# Patient Record
Sex: FEMALE | Race: WHITE | Employment: OTHER | URBAN - METROPOLITAN AREA
[De-identification: names, ages, dates, MRNs, and addresses within clinical notes are randomized per-mention and may not be internally consistent; named-entity substitution may affect disease eponyms.]

---

## 2020-01-01 ENCOUNTER — APPOINTMENT (OUTPATIENT)
Dept: SPEECH THERAPY | Facility: CLINIC | Age: 70
DRG: 682 | End: 2020-01-01
Attending: STUDENT IN AN ORGANIZED HEALTH CARE EDUCATION/TRAINING PROGRAM
Payer: COMMERCIAL

## 2020-01-01 ENCOUNTER — AMBULATORY - HEALTHEAST (OUTPATIENT)
Dept: OTHER | Facility: CLINIC | Age: 70
End: 2020-01-01

## 2020-01-01 ENCOUNTER — TRANSFERRED RECORDS (OUTPATIENT)
Dept: HEALTH INFORMATION MANAGEMENT | Facility: CLINIC | Age: 70
End: 2020-01-01

## 2020-01-01 ENCOUNTER — APPOINTMENT (OUTPATIENT)
Dept: OCCUPATIONAL THERAPY | Facility: CLINIC | Age: 70
DRG: 682 | End: 2020-01-01
Attending: INTERNAL MEDICINE
Payer: COMMERCIAL

## 2020-01-01 ENCOUNTER — HOSPITAL ENCOUNTER (INPATIENT)
Facility: CLINIC | Age: 70
LOS: 27 days | DRG: 682 | End: 2020-08-24
Attending: INTERNAL MEDICINE | Admitting: INTERNAL MEDICINE
Payer: COMMERCIAL

## 2020-01-01 ENCOUNTER — APPOINTMENT (OUTPATIENT)
Dept: PHYSICAL THERAPY | Facility: CLINIC | Age: 70
DRG: 682 | End: 2020-01-01
Attending: INTERNAL MEDICINE
Payer: COMMERCIAL

## 2020-01-01 ENCOUNTER — APPOINTMENT (OUTPATIENT)
Dept: INTERVENTIONAL RADIOLOGY/VASCULAR | Facility: CLINIC | Age: 70
DRG: 682 | End: 2020-01-01
Attending: NURSE PRACTITIONER
Payer: COMMERCIAL

## 2020-01-01 ENCOUNTER — APPOINTMENT (OUTPATIENT)
Dept: GENERAL RADIOLOGY | Facility: CLINIC | Age: 70
DRG: 682 | End: 2020-01-01
Attending: INTERNAL MEDICINE
Payer: COMMERCIAL

## 2020-01-01 ENCOUNTER — APPOINTMENT (OUTPATIENT)
Dept: INTERVENTIONAL RADIOLOGY/VASCULAR | Facility: CLINIC | Age: 70
DRG: 682 | End: 2020-01-01
Attending: PHYSICIAN ASSISTANT
Payer: COMMERCIAL

## 2020-01-01 ENCOUNTER — DOCUMENTATION ONLY (OUTPATIENT)
Dept: OTHER | Facility: CLINIC | Age: 70
End: 2020-01-01

## 2020-01-01 ENCOUNTER — APPOINTMENT (OUTPATIENT)
Dept: OCCUPATIONAL THERAPY | Facility: CLINIC | Age: 70
DRG: 682 | End: 2020-01-01
Attending: STUDENT IN AN ORGANIZED HEALTH CARE EDUCATION/TRAINING PROGRAM
Payer: COMMERCIAL

## 2020-01-01 ENCOUNTER — APPOINTMENT (OUTPATIENT)
Dept: ULTRASOUND IMAGING | Facility: CLINIC | Age: 70
DRG: 682 | End: 2020-01-01
Attending: HOSPITALIST
Payer: COMMERCIAL

## 2020-01-01 ENCOUNTER — APPOINTMENT (OUTPATIENT)
Dept: ULTRASOUND IMAGING | Facility: CLINIC | Age: 70
DRG: 682 | End: 2020-01-01
Attending: INTERNAL MEDICINE
Payer: COMMERCIAL

## 2020-01-01 ENCOUNTER — APPOINTMENT (OUTPATIENT)
Dept: GENERAL RADIOLOGY | Facility: CLINIC | Age: 70
DRG: 682 | End: 2020-01-01
Attending: STUDENT IN AN ORGANIZED HEALTH CARE EDUCATION/TRAINING PROGRAM
Payer: COMMERCIAL

## 2020-01-01 ENCOUNTER — APPOINTMENT (OUTPATIENT)
Dept: GENERAL RADIOLOGY | Facility: CLINIC | Age: 70
DRG: 682 | End: 2020-01-01
Attending: HOSPITALIST
Payer: COMMERCIAL

## 2020-01-01 VITALS
TEMPERATURE: 97.5 F | DIASTOLIC BLOOD PRESSURE: 55 MMHG | RESPIRATION RATE: 12 BRPM | SYSTOLIC BLOOD PRESSURE: 112 MMHG | HEART RATE: 75 BPM | WEIGHT: 197.09 LBS | OXYGEN SATURATION: 95 % | HEIGHT: 60 IN | BODY MASS INDEX: 38.69 KG/M2

## 2020-01-01 LAB
ABO + RH BLD: NORMAL
ALBUMIN FLD-MCNC: 0.6 G/DL
ALBUMIN FLD-MCNC: 0.6 G/DL
ALBUMIN FLD-MCNC: NORMAL G/DL
ALBUMIN SERPL-MCNC: 2.2 G/DL (ref 3.4–5)
ALBUMIN SERPL-MCNC: 2.4 G/DL (ref 3.4–5)
ALBUMIN SERPL-MCNC: 2.5 G/DL (ref 3.4–5)
ALBUMIN SERPL-MCNC: 2.6 G/DL (ref 3.4–5)
ALBUMIN SERPL-MCNC: 2.6 G/DL (ref 3.4–5)
ALBUMIN SERPL-MCNC: 2.7 G/DL (ref 3.4–5)
ALBUMIN SERPL-MCNC: 2.7 G/DL (ref 3.4–5)
ALBUMIN SERPL-MCNC: 2.8 G/DL (ref 3.4–5)
ALBUMIN SERPL-MCNC: 3.1 G/DL (ref 3.4–5)
ALBUMIN UR-MCNC: 10 MG/DL
ALBUMIN UR-MCNC: 30 MG/DL
ALP SERPL-CCNC: 100 U/L (ref 40–150)
ALP SERPL-CCNC: 100 U/L (ref 40–150)
ALP SERPL-CCNC: 102 U/L (ref 40–150)
ALP SERPL-CCNC: 111 U/L (ref 40–150)
ALP SERPL-CCNC: 120 U/L (ref 40–150)
ALP SERPL-CCNC: 78 U/L (ref 40–150)
ALP SERPL-CCNC: 80 U/L (ref 40–150)
ALP SERPL-CCNC: 80 U/L (ref 40–150)
ALP SERPL-CCNC: 86 U/L (ref 40–150)
ALP SERPL-CCNC: 87 U/L (ref 40–150)
ALP SERPL-CCNC: 88 U/L (ref 40–150)
ALT SERPL W P-5'-P-CCNC: 10 U/L (ref 0–50)
ALT SERPL W P-5'-P-CCNC: 11 U/L (ref 0–50)
ALT SERPL W P-5'-P-CCNC: 8 U/L (ref 0–50)
ALT SERPL W P-5'-P-CCNC: 9 U/L (ref 0–50)
ALT SERPL W P-5'-P-CCNC: 9 U/L (ref 0–50)
AMMONIA PLAS-SCNC: 194 UMOL/L (ref 10–50)
AMMONIA PLAS-SCNC: 35 UMOL/L (ref 10–50)
ANION GAP SERPL CALCULATED.3IONS-SCNC: 10 MMOL/L (ref 3–14)
ANION GAP SERPL CALCULATED.3IONS-SCNC: 12 MMOL/L (ref 3–14)
ANION GAP SERPL CALCULATED.3IONS-SCNC: 12 MMOL/L (ref 3–14)
ANION GAP SERPL CALCULATED.3IONS-SCNC: 6 MMOL/L (ref 3–14)
ANION GAP SERPL CALCULATED.3IONS-SCNC: 7 MMOL/L (ref 3–14)
ANION GAP SERPL CALCULATED.3IONS-SCNC: 8 MMOL/L (ref 3–14)
ANION GAP SERPL CALCULATED.3IONS-SCNC: 9 MMOL/L (ref 3–14)
APPEARANCE FLD: NORMAL
APPEARANCE UR: ABNORMAL
APPEARANCE UR: CLEAR
AST SERPL W P-5'-P-CCNC: 10 U/L (ref 0–45)
AST SERPL W P-5'-P-CCNC: 11 U/L (ref 0–45)
AST SERPL W P-5'-P-CCNC: 12 U/L (ref 0–45)
AST SERPL W P-5'-P-CCNC: 13 U/L (ref 0–45)
AST SERPL W P-5'-P-CCNC: 6 U/L (ref 0–45)
AST SERPL W P-5'-P-CCNC: 7 U/L (ref 0–45)
AST SERPL W P-5'-P-CCNC: 8 U/L (ref 0–45)
BACTERIA #/AREA URNS HPF: ABNORMAL /HPF
BACTERIA SPEC CULT: ABNORMAL
BACTERIA SPEC CULT: NO GROWTH
BACTERIA SPEC CULT: NORMAL
BACTERIA SPEC CULT: NORMAL
BASE DEFICIT BLDV-SCNC: 11.2 MMOL/L
BASE DEFICIT BLDV-SCNC: 11.6 MMOL/L
BILIRUB DIRECT SERPL-MCNC: 0.4 MG/DL (ref 0–0.2)
BILIRUB SERPL-MCNC: 1 MG/DL (ref 0.2–1.3)
BILIRUB SERPL-MCNC: 1 MG/DL (ref 0.2–1.3)
BILIRUB SERPL-MCNC: 1.1 MG/DL (ref 0.2–1.3)
BILIRUB SERPL-MCNC: 1.2 MG/DL (ref 0.2–1.3)
BILIRUB SERPL-MCNC: 1.3 MG/DL (ref 0.2–1.3)
BILIRUB SERPL-MCNC: 1.4 MG/DL (ref 0.2–1.3)
BILIRUB SERPL-MCNC: 1.5 MG/DL (ref 0.2–1.3)
BILIRUB UR QL STRIP: ABNORMAL
BILIRUB UR QL STRIP: NEGATIVE
BLD GP AB SCN SERPL QL: NORMAL
BLD GP AB SCN SERPL QL: NORMAL
BLD PROD TYP BPU: NORMAL
BLD UNIT ID BPU: 0
BLOOD BANK CMNT PATIENT-IMP: NORMAL
BLOOD BANK CMNT PATIENT-IMP: NORMAL
BLOOD PRODUCT CODE: NORMAL
BPU ID: NORMAL
BUN SERPL-MCNC: 42 MG/DL (ref 7–30)
BUN SERPL-MCNC: 46 MG/DL (ref 7–30)
BUN SERPL-MCNC: 46 MG/DL (ref 7–30)
BUN SERPL-MCNC: 52 MG/DL (ref 7–30)
BUN SERPL-MCNC: 52 MG/DL (ref 7–30)
BUN SERPL-MCNC: 53 MG/DL (ref 7–30)
BUN SERPL-MCNC: 55 MG/DL (ref 7–30)
BUN SERPL-MCNC: 56 MG/DL (ref 7–30)
BUN SERPL-MCNC: 62 MG/DL (ref 7–30)
BUN SERPL-MCNC: 63 MG/DL (ref 7–30)
BUN SERPL-MCNC: 64 MG/DL (ref 7–30)
BUN SERPL-MCNC: 65 MG/DL (ref 7–30)
BUN SERPL-MCNC: 67 MG/DL (ref 7–30)
BUN SERPL-MCNC: 68 MG/DL (ref 7–30)
BUN SERPL-MCNC: 69 MG/DL (ref 7–30)
BUN SERPL-MCNC: 70 MG/DL (ref 7–30)
BUN SERPL-MCNC: 71 MG/DL (ref 7–30)
BUN SERPL-MCNC: 73 MG/DL (ref 7–30)
BUN SERPL-MCNC: 73 MG/DL (ref 7–30)
BUN SERPL-MCNC: 75 MG/DL (ref 7–30)
BUN SERPL-MCNC: 76 MG/DL (ref 7–30)
BUN SERPL-MCNC: 80 MG/DL (ref 7–30)
BUN SERPL-MCNC: 84 MG/DL (ref 7–30)
C DIFF TOX B STL QL: POSITIVE
CA-I BLD-MCNC: 4.8 MG/DL (ref 4.4–5.2)
CA-I SERPL ISE-MCNC: 4.7 MG/DL (ref 4.4–5.2)
CALCIUM SERPL-MCNC: 7.5 MG/DL (ref 8.5–10.1)
CALCIUM SERPL-MCNC: 7.6 MG/DL (ref 8.5–10.1)
CALCIUM SERPL-MCNC: 7.6 MG/DL (ref 8.5–10.1)
CALCIUM SERPL-MCNC: 7.8 MG/DL (ref 8.5–10.1)
CALCIUM SERPL-MCNC: 7.8 MG/DL (ref 8.5–10.1)
CALCIUM SERPL-MCNC: 7.9 MG/DL (ref 8.5–10.1)
CALCIUM SERPL-MCNC: 8 MG/DL (ref 8.5–10.1)
CALCIUM SERPL-MCNC: 8.1 MG/DL (ref 8.5–10.1)
CALCIUM SERPL-MCNC: 8.2 MG/DL (ref 8.5–10.1)
CALCIUM SERPL-MCNC: 8.3 MG/DL (ref 8.5–10.1)
CALCIUM SERPL-MCNC: 8.4 MG/DL (ref 8.5–10.1)
CALCIUM SERPL-MCNC: 8.5 MG/DL (ref 8.5–10.1)
CALCIUM SERPL-MCNC: 8.6 MG/DL (ref 8.5–10.1)
CHLORIDE SERPL-SCNC: 107 MMOL/L (ref 94–109)
CHLORIDE SERPL-SCNC: 108 MMOL/L (ref 94–109)
CHLORIDE SERPL-SCNC: 108 MMOL/L (ref 94–109)
CHLORIDE SERPL-SCNC: 110 MMOL/L (ref 94–109)
CHLORIDE SERPL-SCNC: 111 MMOL/L (ref 94–109)
CHLORIDE SERPL-SCNC: 111 MMOL/L (ref 94–109)
CHLORIDE SERPL-SCNC: 112 MMOL/L (ref 94–109)
CHLORIDE SERPL-SCNC: 113 MMOL/L (ref 94–109)
CHLORIDE SERPL-SCNC: 114 MMOL/L (ref 94–109)
CHLORIDE SERPL-SCNC: 116 MMOL/L (ref 94–109)
CHLORIDE SERPL-SCNC: 118 MMOL/L (ref 94–109)
CHLORIDE SERPL-SCNC: 118 MMOL/L (ref 94–109)
CHLORIDE SERPL-SCNC: 120 MMOL/L (ref 94–109)
CHLORIDE SERPL-SCNC: 120 MMOL/L (ref 94–109)
CHLORIDE SERPL-SCNC: 122 MMOL/L (ref 94–109)
CO2 SERPL-SCNC: 15 MMOL/L (ref 20–32)
CO2 SERPL-SCNC: 16 MMOL/L (ref 20–32)
CO2 SERPL-SCNC: 16 MMOL/L (ref 20–32)
CO2 SERPL-SCNC: 17 MMOL/L (ref 20–32)
CO2 SERPL-SCNC: 18 MMOL/L (ref 20–32)
CO2 SERPL-SCNC: 19 MMOL/L (ref 20–32)
CO2 SERPL-SCNC: 20 MMOL/L (ref 20–32)
CO2 SERPL-SCNC: 21 MMOL/L (ref 20–32)
CO2 SERPL-SCNC: 21 MMOL/L (ref 20–32)
COLOR FLD: NORMAL
COLOR FLD: NORMAL
COLOR FLD: YELLOW
COLOR UR AUTO: YELLOW
COLOR UR AUTO: YELLOW
COPATH REPORT: NORMAL
CREAT SERPL-MCNC: 2.32 MG/DL (ref 0.52–1.04)
CREAT SERPL-MCNC: 2.36 MG/DL (ref 0.52–1.04)
CREAT SERPL-MCNC: 2.44 MG/DL (ref 0.52–1.04)
CREAT SERPL-MCNC: 2.5 MG/DL (ref 0.52–1.04)
CREAT SERPL-MCNC: 2.53 MG/DL (ref 0.52–1.04)
CREAT SERPL-MCNC: 2.54 MG/DL (ref 0.52–1.04)
CREAT SERPL-MCNC: 2.58 MG/DL (ref 0.52–1.04)
CREAT SERPL-MCNC: 2.65 MG/DL (ref 0.52–1.04)
CREAT SERPL-MCNC: 2.73 MG/DL (ref 0.52–1.04)
CREAT SERPL-MCNC: 2.79 MG/DL (ref 0.52–1.04)
CREAT SERPL-MCNC: 2.9 MG/DL (ref 0.52–1.04)
CREAT SERPL-MCNC: 3.06 MG/DL (ref 0.52–1.04)
CREAT SERPL-MCNC: 3.15 MG/DL (ref 0.52–1.04)
CREAT SERPL-MCNC: 3.31 MG/DL (ref 0.52–1.04)
CREAT SERPL-MCNC: 3.44 MG/DL (ref 0.52–1.04)
CREAT SERPL-MCNC: 3.46 MG/DL (ref 0.52–1.04)
CREAT SERPL-MCNC: 3.62 MG/DL (ref 0.52–1.04)
CREAT SERPL-MCNC: 3.62 MG/DL (ref 0.52–1.04)
CREAT SERPL-MCNC: 3.7 MG/DL (ref 0.52–1.04)
CREAT SERPL-MCNC: 3.95 MG/DL (ref 0.52–1.04)
CREAT SERPL-MCNC: 4.09 MG/DL (ref 0.52–1.04)
CREAT SERPL-MCNC: 4.49 MG/DL (ref 0.52–1.04)
CREAT SERPL-MCNC: 4.61 MG/DL (ref 0.52–1.04)
CREAT UR-MCNC: 136 MG/DL
CREAT UR-MCNC: 76 MG/DL
ERYTHROCYTE [DISTWIDTH] IN BLOOD BY AUTOMATED COUNT: 17.1 % (ref 10–15)
ERYTHROCYTE [DISTWIDTH] IN BLOOD BY AUTOMATED COUNT: 17.2 % (ref 10–15)
ERYTHROCYTE [DISTWIDTH] IN BLOOD BY AUTOMATED COUNT: 17.2 % (ref 10–15)
ERYTHROCYTE [DISTWIDTH] IN BLOOD BY AUTOMATED COUNT: 17.6 % (ref 10–15)
ERYTHROCYTE [DISTWIDTH] IN BLOOD BY AUTOMATED COUNT: 17.9 % (ref 10–15)
ERYTHROCYTE [DISTWIDTH] IN BLOOD BY AUTOMATED COUNT: 18.2 % (ref 10–15)
ERYTHROCYTE [DISTWIDTH] IN BLOOD BY AUTOMATED COUNT: 18.3 % (ref 10–15)
ERYTHROCYTE [DISTWIDTH] IN BLOOD BY AUTOMATED COUNT: 18.4 % (ref 10–15)
ERYTHROCYTE [DISTWIDTH] IN BLOOD BY AUTOMATED COUNT: 18.5 % (ref 10–15)
ERYTHROCYTE [DISTWIDTH] IN BLOOD BY AUTOMATED COUNT: 18.5 % (ref 10–15)
ERYTHROCYTE [DISTWIDTH] IN BLOOD BY AUTOMATED COUNT: 18.6 % (ref 10–15)
ERYTHROCYTE [DISTWIDTH] IN BLOOD BY AUTOMATED COUNT: 18.8 % (ref 10–15)
ERYTHROCYTE [DISTWIDTH] IN BLOOD BY AUTOMATED COUNT: 19.1 % (ref 10–15)
ERYTHROCYTE [DISTWIDTH] IN BLOOD BY AUTOMATED COUNT: 19.2 % (ref 10–15)
ERYTHROCYTE [DISTWIDTH] IN BLOOD BY AUTOMATED COUNT: 19.3 % (ref 10–15)
ERYTHROCYTE [DISTWIDTH] IN BLOOD BY AUTOMATED COUNT: NORMAL % (ref 10–15)
FERRITIN SERPL-MCNC: 352 NG/ML (ref 8–252)
FIBRINOGEN PPP-MCNC: 118 MG/DL (ref 200–420)
FIBRINOGEN PPP-MCNC: 139 MG/DL (ref 200–420)
GFR SERPL CREATININE-BSD FRML MDRD: 10 ML/MIN/{1.73_M2}
GFR SERPL CREATININE-BSD FRML MDRD: 11 ML/MIN/{1.73_M2}
GFR SERPL CREATININE-BSD FRML MDRD: 12 ML/MIN/{1.73_M2}
GFR SERPL CREATININE-BSD FRML MDRD: 13 ML/MIN/{1.73_M2}
GFR SERPL CREATININE-BSD FRML MDRD: 14 ML/MIN/{1.73_M2}
GFR SERPL CREATININE-BSD FRML MDRD: 15 ML/MIN/{1.73_M2}
GFR SERPL CREATININE-BSD FRML MDRD: 16 ML/MIN/{1.73_M2}
GFR SERPL CREATININE-BSD FRML MDRD: 16 ML/MIN/{1.73_M2}
GFR SERPL CREATININE-BSD FRML MDRD: 17 ML/MIN/{1.73_M2}
GFR SERPL CREATININE-BSD FRML MDRD: 17 ML/MIN/{1.73_M2}
GFR SERPL CREATININE-BSD FRML MDRD: 18 ML/MIN/{1.73_M2}
GFR SERPL CREATININE-BSD FRML MDRD: 19 ML/MIN/{1.73_M2}
GFR SERPL CREATININE-BSD FRML MDRD: 19 ML/MIN/{1.73_M2}
GFR SERPL CREATININE-BSD FRML MDRD: 20 ML/MIN/{1.73_M2}
GFR SERPL CREATININE-BSD FRML MDRD: 20 ML/MIN/{1.73_M2}
GFR SERPL CREATININE-BSD FRML MDRD: 9 ML/MIN/{1.73_M2}
GFR SERPL CREATININE-BSD FRML MDRD: 9 ML/MIN/{1.73_M2}
GLUCOSE BLDC GLUCOMTR-MCNC: 104 MG/DL (ref 70–99)
GLUCOSE BLDC GLUCOMTR-MCNC: 104 MG/DL (ref 70–99)
GLUCOSE BLDC GLUCOMTR-MCNC: 106 MG/DL (ref 70–99)
GLUCOSE BLDC GLUCOMTR-MCNC: 108 MG/DL (ref 70–99)
GLUCOSE BLDC GLUCOMTR-MCNC: 111 MG/DL (ref 70–99)
GLUCOSE BLDC GLUCOMTR-MCNC: 114 MG/DL (ref 70–99)
GLUCOSE BLDC GLUCOMTR-MCNC: 117 MG/DL (ref 70–99)
GLUCOSE BLDC GLUCOMTR-MCNC: 117 MG/DL (ref 70–99)
GLUCOSE BLDC GLUCOMTR-MCNC: 122 MG/DL (ref 70–99)
GLUCOSE BLDC GLUCOMTR-MCNC: 123 MG/DL (ref 70–99)
GLUCOSE BLDC GLUCOMTR-MCNC: 125 MG/DL (ref 70–99)
GLUCOSE BLDC GLUCOMTR-MCNC: 125 MG/DL (ref 70–99)
GLUCOSE BLDC GLUCOMTR-MCNC: 127 MG/DL (ref 70–99)
GLUCOSE BLDC GLUCOMTR-MCNC: 128 MG/DL (ref 70–99)
GLUCOSE BLDC GLUCOMTR-MCNC: 129 MG/DL (ref 70–99)
GLUCOSE BLDC GLUCOMTR-MCNC: 129 MG/DL (ref 70–99)
GLUCOSE BLDC GLUCOMTR-MCNC: 132 MG/DL (ref 70–99)
GLUCOSE BLDC GLUCOMTR-MCNC: 133 MG/DL (ref 70–99)
GLUCOSE BLDC GLUCOMTR-MCNC: 133 MG/DL (ref 70–99)
GLUCOSE BLDC GLUCOMTR-MCNC: 134 MG/DL (ref 70–99)
GLUCOSE BLDC GLUCOMTR-MCNC: 135 MG/DL (ref 70–99)
GLUCOSE BLDC GLUCOMTR-MCNC: 139 MG/DL (ref 70–99)
GLUCOSE BLDC GLUCOMTR-MCNC: 145 MG/DL (ref 70–99)
GLUCOSE BLDC GLUCOMTR-MCNC: 146 MG/DL (ref 70–99)
GLUCOSE BLDC GLUCOMTR-MCNC: 147 MG/DL (ref 70–99)
GLUCOSE BLDC GLUCOMTR-MCNC: 147 MG/DL (ref 70–99)
GLUCOSE BLDC GLUCOMTR-MCNC: 150 MG/DL (ref 70–99)
GLUCOSE BLDC GLUCOMTR-MCNC: 150 MG/DL (ref 70–99)
GLUCOSE BLDC GLUCOMTR-MCNC: 152 MG/DL (ref 70–99)
GLUCOSE BLDC GLUCOMTR-MCNC: 153 MG/DL (ref 70–99)
GLUCOSE BLDC GLUCOMTR-MCNC: 153 MG/DL (ref 70–99)
GLUCOSE BLDC GLUCOMTR-MCNC: 154 MG/DL (ref 70–99)
GLUCOSE BLDC GLUCOMTR-MCNC: 156 MG/DL (ref 70–99)
GLUCOSE BLDC GLUCOMTR-MCNC: 157 MG/DL (ref 70–99)
GLUCOSE BLDC GLUCOMTR-MCNC: 158 MG/DL (ref 70–99)
GLUCOSE BLDC GLUCOMTR-MCNC: 162 MG/DL (ref 70–99)
GLUCOSE BLDC GLUCOMTR-MCNC: 162 MG/DL (ref 70–99)
GLUCOSE BLDC GLUCOMTR-MCNC: 163 MG/DL (ref 70–99)
GLUCOSE BLDC GLUCOMTR-MCNC: 165 MG/DL (ref 70–99)
GLUCOSE BLDC GLUCOMTR-MCNC: 165 MG/DL (ref 70–99)
GLUCOSE BLDC GLUCOMTR-MCNC: 166 MG/DL (ref 70–99)
GLUCOSE BLDC GLUCOMTR-MCNC: 166 MG/DL (ref 70–99)
GLUCOSE BLDC GLUCOMTR-MCNC: 176 MG/DL (ref 70–99)
GLUCOSE BLDC GLUCOMTR-MCNC: 177 MG/DL (ref 70–99)
GLUCOSE BLDC GLUCOMTR-MCNC: 180 MG/DL (ref 70–99)
GLUCOSE BLDC GLUCOMTR-MCNC: 181 MG/DL (ref 70–99)
GLUCOSE BLDC GLUCOMTR-MCNC: 183 MG/DL (ref 70–99)
GLUCOSE BLDC GLUCOMTR-MCNC: 183 MG/DL (ref 70–99)
GLUCOSE BLDC GLUCOMTR-MCNC: 186 MG/DL (ref 70–99)
GLUCOSE BLDC GLUCOMTR-MCNC: 189 MG/DL (ref 70–99)
GLUCOSE BLDC GLUCOMTR-MCNC: 189 MG/DL (ref 70–99)
GLUCOSE BLDC GLUCOMTR-MCNC: 192 MG/DL (ref 70–99)
GLUCOSE BLDC GLUCOMTR-MCNC: 193 MG/DL (ref 70–99)
GLUCOSE BLDC GLUCOMTR-MCNC: 196 MG/DL (ref 70–99)
GLUCOSE BLDC GLUCOMTR-MCNC: 197 MG/DL (ref 70–99)
GLUCOSE BLDC GLUCOMTR-MCNC: 199 MG/DL (ref 70–99)
GLUCOSE BLDC GLUCOMTR-MCNC: 199 MG/DL (ref 70–99)
GLUCOSE BLDC GLUCOMTR-MCNC: 202 MG/DL (ref 70–99)
GLUCOSE BLDC GLUCOMTR-MCNC: 202 MG/DL (ref 70–99)
GLUCOSE BLDC GLUCOMTR-MCNC: 203 MG/DL (ref 70–99)
GLUCOSE BLDC GLUCOMTR-MCNC: 206 MG/DL (ref 70–99)
GLUCOSE BLDC GLUCOMTR-MCNC: 210 MG/DL (ref 70–99)
GLUCOSE BLDC GLUCOMTR-MCNC: 211 MG/DL (ref 70–99)
GLUCOSE BLDC GLUCOMTR-MCNC: 212 MG/DL (ref 70–99)
GLUCOSE BLDC GLUCOMTR-MCNC: 230 MG/DL (ref 70–99)
GLUCOSE BLDC GLUCOMTR-MCNC: 231 MG/DL (ref 70–99)
GLUCOSE BLDC GLUCOMTR-MCNC: 248 MG/DL (ref 70–99)
GLUCOSE BLDC GLUCOMTR-MCNC: 252 MG/DL (ref 70–99)
GLUCOSE BLDC GLUCOMTR-MCNC: 259 MG/DL (ref 70–99)
GLUCOSE BLDC GLUCOMTR-MCNC: 57 MG/DL (ref 70–99)
GLUCOSE BLDC GLUCOMTR-MCNC: 68 MG/DL (ref 70–99)
GLUCOSE BLDC GLUCOMTR-MCNC: 69 MG/DL (ref 70–99)
GLUCOSE BLDC GLUCOMTR-MCNC: 73 MG/DL (ref 70–99)
GLUCOSE BLDC GLUCOMTR-MCNC: 74 MG/DL (ref 70–99)
GLUCOSE BLDC GLUCOMTR-MCNC: 77 MG/DL (ref 70–99)
GLUCOSE BLDC GLUCOMTR-MCNC: 78 MG/DL (ref 70–99)
GLUCOSE BLDC GLUCOMTR-MCNC: 81 MG/DL (ref 70–99)
GLUCOSE BLDC GLUCOMTR-MCNC: 82 MG/DL (ref 70–99)
GLUCOSE BLDC GLUCOMTR-MCNC: 83 MG/DL (ref 70–99)
GLUCOSE BLDC GLUCOMTR-MCNC: 84 MG/DL (ref 70–99)
GLUCOSE BLDC GLUCOMTR-MCNC: 84 MG/DL (ref 70–99)
GLUCOSE BLDC GLUCOMTR-MCNC: 85 MG/DL (ref 70–99)
GLUCOSE BLDC GLUCOMTR-MCNC: 88 MG/DL (ref 70–99)
GLUCOSE BLDC GLUCOMTR-MCNC: 90 MG/DL (ref 70–99)
GLUCOSE BLDC GLUCOMTR-MCNC: 91 MG/DL (ref 70–99)
GLUCOSE BLDC GLUCOMTR-MCNC: 95 MG/DL (ref 70–99)
GLUCOSE SERPL-MCNC: 107 MG/DL (ref 70–99)
GLUCOSE SERPL-MCNC: 125 MG/DL (ref 70–99)
GLUCOSE SERPL-MCNC: 126 MG/DL (ref 70–99)
GLUCOSE SERPL-MCNC: 132 MG/DL (ref 70–99)
GLUCOSE SERPL-MCNC: 133 MG/DL (ref 70–99)
GLUCOSE SERPL-MCNC: 136 MG/DL (ref 70–99)
GLUCOSE SERPL-MCNC: 145 MG/DL (ref 70–99)
GLUCOSE SERPL-MCNC: 155 MG/DL (ref 70–99)
GLUCOSE SERPL-MCNC: 156 MG/DL (ref 70–99)
GLUCOSE SERPL-MCNC: 159 MG/DL (ref 70–99)
GLUCOSE SERPL-MCNC: 159 MG/DL (ref 70–99)
GLUCOSE SERPL-MCNC: 163 MG/DL (ref 70–99)
GLUCOSE SERPL-MCNC: 174 MG/DL (ref 70–99)
GLUCOSE SERPL-MCNC: 178 MG/DL (ref 70–99)
GLUCOSE SERPL-MCNC: 182 MG/DL (ref 70–99)
GLUCOSE SERPL-MCNC: 191 MG/DL (ref 70–99)
GLUCOSE SERPL-MCNC: 200 MG/DL (ref 70–99)
GLUCOSE SERPL-MCNC: 232 MG/DL (ref 70–99)
GLUCOSE SERPL-MCNC: 233 MG/DL (ref 70–99)
GLUCOSE SERPL-MCNC: 87 MG/DL (ref 70–99)
GLUCOSE SERPL-MCNC: 91 MG/DL (ref 70–99)
GLUCOSE SERPL-MCNC: 92 MG/DL (ref 70–99)
GLUCOSE SERPL-MCNC: 99 MG/DL (ref 70–99)
GLUCOSE UR STRIP-MCNC: NEGATIVE MG/DL
GLUCOSE UR STRIP-MCNC: NEGATIVE MG/DL
GRAM STN SPEC: NORMAL
GRAN CASTS #/AREA URNS LPF: 4 /LPF
HBA1C MFR BLD: 6.4 % (ref 0–5.6)
HCO3 BLDV-SCNC: 15 MMOL/L (ref 21–28)
HCO3 BLDV-SCNC: 15 MMOL/L (ref 21–28)
HCT VFR BLD AUTO: 23.4 % (ref 35–47)
HCT VFR BLD AUTO: 24.5 % (ref 35–47)
HCT VFR BLD AUTO: 24.6 % (ref 35–47)
HCT VFR BLD AUTO: 24.7 % (ref 35–47)
HCT VFR BLD AUTO: 24.9 % (ref 35–47)
HCT VFR BLD AUTO: 25 % (ref 35–47)
HCT VFR BLD AUTO: 25.2 % (ref 35–47)
HCT VFR BLD AUTO: 25.5 % (ref 35–47)
HCT VFR BLD AUTO: 25.8 % (ref 35–47)
HCT VFR BLD AUTO: 26.5 % (ref 35–47)
HCT VFR BLD AUTO: 26.6 % (ref 35–47)
HCT VFR BLD AUTO: 26.7 % (ref 35–47)
HCT VFR BLD AUTO: 26.7 % (ref 35–47)
HCT VFR BLD AUTO: 26.8 % (ref 35–47)
HCT VFR BLD AUTO: 27.9 % (ref 35–47)
HCT VFR BLD AUTO: 28.1 % (ref 35–47)
HCT VFR BLD AUTO: 28.5 % (ref 35–47)
HCT VFR BLD AUTO: 29.2 % (ref 35–47)
HCT VFR BLD AUTO: 29.8 % (ref 35–47)
HCT VFR BLD AUTO: 30.7 % (ref 35–47)
HCT VFR BLD AUTO: NORMAL % (ref 35–47)
HGB BLD-MCNC: 7.2 G/DL (ref 11.7–15.7)
HGB BLD-MCNC: 7.3 G/DL (ref 11.7–15.7)
HGB BLD-MCNC: 7.4 G/DL (ref 11.7–15.7)
HGB BLD-MCNC: 7.5 G/DL (ref 11.7–15.7)
HGB BLD-MCNC: 7.6 G/DL (ref 11.7–15.7)
HGB BLD-MCNC: 7.7 G/DL (ref 11.7–15.7)
HGB BLD-MCNC: 7.7 G/DL (ref 11.7–15.7)
HGB BLD-MCNC: 7.8 G/DL (ref 11.7–15.7)
HGB BLD-MCNC: 7.8 G/DL (ref 11.7–15.7)
HGB BLD-MCNC: 7.9 G/DL (ref 11.7–15.7)
HGB BLD-MCNC: 7.9 G/DL (ref 11.7–15.7)
HGB BLD-MCNC: 8.1 G/DL (ref 11.7–15.7)
HGB BLD-MCNC: 8.2 G/DL (ref 11.7–15.7)
HGB BLD-MCNC: 8.4 G/DL (ref 11.7–15.7)
HGB BLD-MCNC: 8.5 G/DL (ref 11.7–15.7)
HGB BLD-MCNC: 8.6 G/DL (ref 11.7–15.7)
HGB BLD-MCNC: 8.7 G/DL (ref 11.7–15.7)
HGB BLD-MCNC: 8.7 G/DL (ref 11.7–15.7)
HGB BLD-MCNC: 9 G/DL (ref 11.7–15.7)
HGB BLD-MCNC: 9.2 G/DL (ref 11.7–15.7)
HGB BLD-MCNC: NORMAL G/DL (ref 11.7–15.7)
HGB BLD-MCNC: NORMAL G/DL (ref 11.7–15.7)
HGB UR QL STRIP: ABNORMAL
HGB UR QL STRIP: ABNORMAL
HYALINE CASTS #/AREA URNS LPF: 5 /LPF (ref 0–2)
INR PPP: 1.37 (ref 0.86–1.14)
INR PPP: 1.48 (ref 0.86–1.14)
INR PPP: 1.54 (ref 0.86–1.14)
INR PPP: 1.55 (ref 0.86–1.14)
INR PPP: 1.57 (ref 0.86–1.14)
INR PPP: 1.57 (ref 0.86–1.14)
INR PPP: 1.58 (ref 0.86–1.14)
INR PPP: 1.64 (ref 0.86–1.14)
INR PPP: 1.66 (ref 0.86–1.14)
INR PPP: 1.67 (ref 0.86–1.14)
INR PPP: 1.71 (ref 0.86–1.14)
INR PPP: 1.81 (ref 0.86–1.14)
INR PPP: 1.82 (ref 0.86–1.14)
INTERPRETATION ECG - MUSE: NORMAL
INTERPRETATION ECG - MUSE: NORMAL
IRON SATN MFR SERPL: 47 % (ref 15–46)
IRON SERPL-MCNC: 51 UG/DL (ref 35–180)
KETONES UR STRIP-MCNC: NEGATIVE MG/DL
KETONES UR STRIP-MCNC: NEGATIVE MG/DL
LACTATE BLD-SCNC: 1.8 MMOL/L (ref 0.7–2)
LACTATE BLD-SCNC: 2.6 MMOL/L (ref 0.7–2)
LACTATE BLD-SCNC: NORMAL MMOL/L (ref 0.7–2)
LEUKOCYTE ESTERASE UR QL STRIP: ABNORMAL
LEUKOCYTE ESTERASE UR QL STRIP: ABNORMAL
LYMPHOCYTES NFR FLD MANUAL: 20 %
LYMPHOCYTES NFR FLD MANUAL: 23 %
LYMPHOCYTES NFR FLD MANUAL: 3 %
Lab: NORMAL
MAGNESIUM SERPL-MCNC: 1.4 MG/DL (ref 1.6–2.3)
MAGNESIUM SERPL-MCNC: 1.8 MG/DL (ref 1.6–2.3)
MAGNESIUM SERPL-MCNC: 1.9 MG/DL (ref 1.6–2.3)
MAGNESIUM SERPL-MCNC: 2.1 MG/DL (ref 1.6–2.3)
MCH RBC QN AUTO: 29 PG (ref 26.5–33)
MCH RBC QN AUTO: 29.2 PG (ref 26.5–33)
MCH RBC QN AUTO: 29.3 PG (ref 26.5–33)
MCH RBC QN AUTO: 29.7 PG (ref 26.5–33)
MCH RBC QN AUTO: 29.8 PG (ref 26.5–33)
MCH RBC QN AUTO: 29.9 PG (ref 26.5–33)
MCH RBC QN AUTO: 30 PG (ref 26.5–33)
MCH RBC QN AUTO: 30 PG (ref 26.5–33)
MCH RBC QN AUTO: 30.1 PG (ref 26.5–33)
MCH RBC QN AUTO: 30.2 PG (ref 26.5–33)
MCH RBC QN AUTO: 30.3 PG (ref 26.5–33)
MCH RBC QN AUTO: 30.5 PG (ref 26.5–33)
MCH RBC QN AUTO: NORMAL PG (ref 26.5–33)
MCHC RBC AUTO-ENTMCNC: 27.7 G/DL (ref 31.5–36.5)
MCHC RBC AUTO-ENTMCNC: 29.2 G/DL (ref 31.5–36.5)
MCHC RBC AUTO-ENTMCNC: 29.2 G/DL (ref 31.5–36.5)
MCHC RBC AUTO-ENTMCNC: 29.3 G/DL (ref 31.5–36.5)
MCHC RBC AUTO-ENTMCNC: 29.4 G/DL (ref 31.5–36.5)
MCHC RBC AUTO-ENTMCNC: 29.5 G/DL (ref 31.5–36.5)
MCHC RBC AUTO-ENTMCNC: 29.7 G/DL (ref 31.5–36.5)
MCHC RBC AUTO-ENTMCNC: 29.8 G/DL (ref 31.5–36.5)
MCHC RBC AUTO-ENTMCNC: 29.8 G/DL (ref 31.5–36.5)
MCHC RBC AUTO-ENTMCNC: 30 G/DL (ref 31.5–36.5)
MCHC RBC AUTO-ENTMCNC: 30.1 G/DL (ref 31.5–36.5)
MCHC RBC AUTO-ENTMCNC: 30.2 G/DL (ref 31.5–36.5)
MCHC RBC AUTO-ENTMCNC: 30.3 G/DL (ref 31.5–36.5)
MCHC RBC AUTO-ENTMCNC: 30.3 G/DL (ref 31.5–36.5)
MCHC RBC AUTO-ENTMCNC: 30.5 G/DL (ref 31.5–36.5)
MCHC RBC AUTO-ENTMCNC: 30.6 G/DL (ref 31.5–36.5)
MCHC RBC AUTO-ENTMCNC: 30.8 G/DL (ref 31.5–36.5)
MCHC RBC AUTO-ENTMCNC: 31.2 G/DL (ref 31.5–36.5)
MCHC RBC AUTO-ENTMCNC: NORMAL G/DL (ref 31.5–36.5)
MCV RBC AUTO: 100 FL (ref 78–100)
MCV RBC AUTO: 101 FL (ref 78–100)
MCV RBC AUTO: 101 FL (ref 78–100)
MCV RBC AUTO: 102 FL (ref 78–100)
MCV RBC AUTO: 102 FL (ref 78–100)
MCV RBC AUTO: 103 FL (ref 78–100)
MCV RBC AUTO: 109 FL (ref 78–100)
MCV RBC AUTO: 97 FL (ref 78–100)
MCV RBC AUTO: 97 FL (ref 78–100)
MCV RBC AUTO: 98 FL (ref 78–100)
MCV RBC AUTO: 99 FL (ref 78–100)
MCV RBC AUTO: NORMAL FL (ref 78–100)
MUCOUS THREADS #/AREA URNS LPF: PRESENT /LPF
MUCOUS THREADS #/AREA URNS LPF: PRESENT /LPF
NEUTS BAND NFR FLD MANUAL: 22 %
NEUTS BAND NFR FLD MANUAL: 31 %
NEUTS BAND NFR FLD MANUAL: 61 %
NITRATE UR QL: NEGATIVE
NITRATE UR QL: NEGATIVE
NUM BPU REQUESTED: 1
NUM BPU REQUESTED: 2
O2/TOTAL GAS SETTING VFR VENT: 21 %
O2/TOTAL GAS SETTING VFR VENT: 21 %
OTHER CELLS FLD MANUAL: 36 %
OTHER CELLS FLD MANUAL: 46 %
OTHER CELLS FLD MANUAL: 58 %
PCO2 BLDV: 33 MM HG (ref 40–50)
PCO2 BLDV: 34 MM HG (ref 40–50)
PH BLDV: 7.25 PH (ref 7.32–7.43)
PH BLDV: 7.26 PH (ref 7.32–7.43)
PH UR STRIP: 5 PH (ref 5–7)
PH UR STRIP: 5.5 PH (ref 5–7)
PHOSPHATE SERPL-MCNC: 1.8 MG/DL (ref 2.5–4.5)
PHOSPHATE SERPL-MCNC: 2.6 MG/DL (ref 2.5–4.5)
PHOSPHATE SERPL-MCNC: 2.6 MG/DL (ref 2.5–4.5)
PHOSPHATE SERPL-MCNC: 3.1 MG/DL (ref 2.5–4.5)
PHOSPHATE SERPL-MCNC: 3.1 MG/DL (ref 2.5–4.5)
PHOSPHATE SERPL-MCNC: 3.2 MG/DL (ref 2.5–4.5)
PHOSPHATE SERPL-MCNC: 3.4 MG/DL (ref 2.5–4.5)
PLATELET # BLD AUTO: 22 10E9/L (ref 150–450)
PLATELET # BLD AUTO: 30 10E9/L (ref 150–450)
PLATELET # BLD AUTO: 38 10E9/L (ref 150–450)
PLATELET # BLD AUTO: 41 10E9/L (ref 150–450)
PLATELET # BLD AUTO: 49 10E9/L (ref 150–450)
PLATELET # BLD AUTO: 53 10E9/L (ref 150–450)
PLATELET # BLD AUTO: 54 10E9/L (ref 150–450)
PLATELET # BLD AUTO: 54 10E9/L (ref 150–450)
PLATELET # BLD AUTO: 57 10E9/L (ref 150–450)
PLATELET # BLD AUTO: 57 10E9/L (ref 150–450)
PLATELET # BLD AUTO: 58 10E9/L (ref 150–450)
PLATELET # BLD AUTO: 58 10E9/L (ref 150–450)
PLATELET # BLD AUTO: 59 10E9/L (ref 150–450)
PLATELET # BLD AUTO: 59 10E9/L (ref 150–450)
PLATELET # BLD AUTO: 60 10E9/L (ref 150–450)
PLATELET # BLD AUTO: 62 10E9/L (ref 150–450)
PLATELET # BLD AUTO: 63 10E9/L (ref 150–450)
PLATELET # BLD AUTO: 66 10E9/L (ref 150–450)
PLATELET # BLD AUTO: 66 10E9/L (ref 150–450)
PLATELET # BLD AUTO: 72 10E9/L (ref 150–450)
PLATELET # BLD AUTO: 75 10E9/L (ref 150–450)
PLATELET # BLD AUTO: 76 10E9/L (ref 150–450)
PLATELET # BLD AUTO: NORMAL 10E9/L (ref 150–450)
PO2 BLDV: 43 MM HG (ref 25–47)
PO2 BLDV: 72 MM HG (ref 25–47)
POTASSIUM SERPL-SCNC: 4 MMOL/L (ref 3.4–5.3)
POTASSIUM SERPL-SCNC: 4.1 MMOL/L (ref 3.4–5.3)
POTASSIUM SERPL-SCNC: 4.2 MMOL/L (ref 3.4–5.3)
POTASSIUM SERPL-SCNC: 4.3 MMOL/L (ref 3.4–5.3)
POTASSIUM SERPL-SCNC: 4.4 MMOL/L (ref 3.4–5.3)
POTASSIUM SERPL-SCNC: 4.5 MMOL/L (ref 3.4–5.3)
POTASSIUM SERPL-SCNC: 4.6 MMOL/L (ref 3.4–5.3)
POTASSIUM SERPL-SCNC: 4.7 MMOL/L (ref 3.4–5.3)
POTASSIUM SERPL-SCNC: 4.9 MMOL/L (ref 3.4–5.3)
POTASSIUM SERPL-SCNC: 5.5 MMOL/L (ref 3.4–5.3)
PROT FLD-MCNC: 1.2 G/DL
PROT FLD-MCNC: 1.3 G/DL
PROT FLD-MCNC: NORMAL G/DL
PROT SERPL-MCNC: 5 G/DL (ref 6.8–8.8)
PROT SERPL-MCNC: 5.1 G/DL (ref 6.8–8.8)
PROT SERPL-MCNC: 5.4 G/DL (ref 6.8–8.8)
PROT SERPL-MCNC: 5.4 G/DL (ref 6.8–8.8)
PROT SERPL-MCNC: 5.5 G/DL (ref 6.8–8.8)
PROT SERPL-MCNC: 5.5 G/DL (ref 6.8–8.8)
PROT SERPL-MCNC: 5.6 G/DL (ref 6.8–8.8)
PROT SERPL-MCNC: 5.7 G/DL (ref 6.8–8.8)
PROT SERPL-MCNC: 5.7 G/DL (ref 6.8–8.8)
PROT SERPL-MCNC: 5.8 G/DL (ref 6.8–8.8)
PROT SERPL-MCNC: 6.3 G/DL (ref 6.8–8.8)
PROT UR-MCNC: 0.87 G/L
PROT/CREAT 24H UR: 0.64 G/G CR (ref 0–0.2)
RBC # BLD AUTO: 2.39 10E12/L (ref 3.8–5.2)
RBC # BLD AUTO: 2.4 10E12/L (ref 3.8–5.2)
RBC # BLD AUTO: 2.48 10E12/L (ref 3.8–5.2)
RBC # BLD AUTO: 2.51 10E12/L (ref 3.8–5.2)
RBC # BLD AUTO: 2.52 10E12/L (ref 3.8–5.2)
RBC # BLD AUTO: 2.53 10E12/L (ref 3.8–5.2)
RBC # BLD AUTO: 2.56 10E12/L (ref 3.8–5.2)
RBC # BLD AUTO: 2.58 10E12/L (ref 3.8–5.2)
RBC # BLD AUTO: 2.62 10E12/L (ref 3.8–5.2)
RBC # BLD AUTO: 2.66 10E12/L (ref 3.8–5.2)
RBC # BLD AUTO: 2.69 10E12/L (ref 3.8–5.2)
RBC # BLD AUTO: 2.71 10E12/L (ref 3.8–5.2)
RBC # BLD AUTO: 2.72 10E12/L (ref 3.8–5.2)
RBC # BLD AUTO: 2.73 10E12/L (ref 3.8–5.2)
RBC # BLD AUTO: 2.75 10E12/L (ref 3.8–5.2)
RBC # BLD AUTO: 2.82 10E12/L (ref 3.8–5.2)
RBC # BLD AUTO: 2.86 10E12/L (ref 3.8–5.2)
RBC # BLD AUTO: 2.87 10E12/L (ref 3.8–5.2)
RBC # BLD AUTO: 2.9 10E12/L (ref 3.8–5.2)
RBC # BLD AUTO: 2.93 10E12/L (ref 3.8–5.2)
RBC # BLD AUTO: NORMAL 10E12/L (ref 3.8–5.2)
RBC #/AREA URNS AUTO: 13 /HPF (ref 0–2)
RBC #/AREA URNS AUTO: 8 /HPF (ref 0–2)
SARS-COV-2 RNA SPEC QL NAA+PROBE: NOT DETECTED
SODIUM SERPL-SCNC: 133 MMOL/L (ref 133–144)
SODIUM SERPL-SCNC: 133 MMOL/L (ref 133–144)
SODIUM SERPL-SCNC: 134 MMOL/L (ref 133–144)
SODIUM SERPL-SCNC: 134 MMOL/L (ref 133–144)
SODIUM SERPL-SCNC: 137 MMOL/L (ref 133–144)
SODIUM SERPL-SCNC: 138 MMOL/L (ref 133–144)
SODIUM SERPL-SCNC: 138 MMOL/L (ref 133–144)
SODIUM SERPL-SCNC: 139 MMOL/L (ref 133–144)
SODIUM SERPL-SCNC: 139 MMOL/L (ref 133–144)
SODIUM SERPL-SCNC: 140 MMOL/L (ref 133–144)
SODIUM SERPL-SCNC: 140 MMOL/L (ref 133–144)
SODIUM SERPL-SCNC: 141 MMOL/L (ref 133–144)
SODIUM SERPL-SCNC: 143 MMOL/L (ref 133–144)
SODIUM SERPL-SCNC: 144 MMOL/L (ref 133–144)
SODIUM SERPL-SCNC: 145 MMOL/L (ref 133–144)
SODIUM SERPL-SCNC: 146 MMOL/L (ref 133–144)
SODIUM SERPL-SCNC: 149 MMOL/L (ref 133–144)
SODIUM UR-SCNC: 12 MMOL/L
SODIUM UR-SCNC: 9 MMOL/L
SOURCE: ABNORMAL
SOURCE: ABNORMAL
SP GR UR STRIP: 1.01 (ref 1–1.03)
SP GR UR STRIP: 1.02 (ref 1–1.03)
SPECIMEN EXP DATE BLD: NORMAL
SPECIMEN EXP DATE BLD: NORMAL
SPECIMEN SOURCE FLD: NORMAL
SPECIMEN SOURCE: ABNORMAL
SPECIMEN SOURCE: ABNORMAL
SPECIMEN SOURCE: NORMAL
SQUAMOUS #/AREA URNS AUTO: 1 /HPF (ref 0–1)
TIBC SERPL-MCNC: 108 UG/DL (ref 240–430)
TRANS CELLS #/AREA URNS HPF: 1 /HPF (ref 0–1)
TRANSFERRIN SERPL-MCNC: 87 MG/DL (ref 210–360)
TRANSFUSION STATUS PATIENT QL: NORMAL
TSH SERPL DL<=0.005 MIU/L-ACNC: 3.85 MU/L (ref 0.4–4)
UPPER GI ENDOSCOPY: NORMAL
UROBILINOGEN UR STRIP-MCNC: NORMAL MG/DL (ref 0–2)
UROBILINOGEN UR STRIP-MCNC: NORMAL MG/DL (ref 0–2)
WBC # BLD AUTO: 10.4 10E9/L (ref 4–11)
WBC # BLD AUTO: 10.9 10E9/L (ref 4–11)
WBC # BLD AUTO: 13 10E9/L (ref 4–11)
WBC # BLD AUTO: 4.4 10E9/L (ref 4–11)
WBC # BLD AUTO: 5.3 10E9/L (ref 4–11)
WBC # BLD AUTO: 5.3 10E9/L (ref 4–11)
WBC # BLD AUTO: 5.4 10E9/L (ref 4–11)
WBC # BLD AUTO: 5.4 10E9/L (ref 4–11)
WBC # BLD AUTO: 5.6 10E9/L (ref 4–11)
WBC # BLD AUTO: 5.7 10E9/L (ref 4–11)
WBC # BLD AUTO: 5.7 10E9/L (ref 4–11)
WBC # BLD AUTO: 5.8 10E9/L (ref 4–11)
WBC # BLD AUTO: 6.1 10E9/L (ref 4–11)
WBC # BLD AUTO: 6.4 10E9/L (ref 4–11)
WBC # BLD AUTO: 6.5 10E9/L (ref 4–11)
WBC # BLD AUTO: 6.5 10E9/L (ref 4–11)
WBC # BLD AUTO: 6.7 10E9/L (ref 4–11)
WBC # BLD AUTO: 7.5 10E9/L (ref 4–11)
WBC # BLD AUTO: 8.5 10E9/L (ref 4–11)
WBC # BLD AUTO: 8.5 10E9/L (ref 4–11)
WBC # BLD AUTO: NORMAL 10E9/L (ref 4–11)
WBC # FLD AUTO: 104 /UL
WBC # FLD AUTO: 161 /UL
WBC # FLD AUTO: 186 /UL
WBC #/AREA URNS AUTO: 17 /HPF (ref 0–5)
WBC #/AREA URNS AUTO: 74 /HPF (ref 0–5)
WBC CLUMPS #/AREA URNS HPF: PRESENT /HPF
YEAST #/AREA URNS HPF: ABNORMAL /HPF

## 2020-01-01 PROCEDURE — 00000146 ZZHCL STATISTIC GLUCOSE BY METER IP

## 2020-01-01 PROCEDURE — 36415 COLL VENOUS BLD VENIPUNCTURE: CPT | Performed by: STUDENT IN AN ORGANIZED HEALTH CARE EDUCATION/TRAINING PROGRAM

## 2020-01-01 PROCEDURE — 80053 COMPREHEN METABOLIC PANEL: CPT | Performed by: STUDENT IN AN ORGANIZED HEALTH CARE EDUCATION/TRAINING PROGRAM

## 2020-01-01 PROCEDURE — 25000132 ZZH RX MED GY IP 250 OP 250 PS 637: Performed by: STUDENT IN AN ORGANIZED HEALTH CARE EDUCATION/TRAINING PROGRAM

## 2020-01-01 PROCEDURE — 85027 COMPLETE CBC AUTOMATED: CPT | Performed by: STUDENT IN AN ORGANIZED HEALTH CARE EDUCATION/TRAINING PROGRAM

## 2020-01-01 PROCEDURE — 25000128 H RX IP 250 OP 636: Performed by: STUDENT IN AN ORGANIZED HEALTH CARE EDUCATION/TRAINING PROGRAM

## 2020-01-01 PROCEDURE — 25000132 ZZH RX MED GY IP 250 OP 250 PS 637: Performed by: PEDIATRICS

## 2020-01-01 PROCEDURE — P9047 ALBUMIN (HUMAN), 25%, 50ML: HCPCS | Performed by: PEDIATRICS

## 2020-01-01 PROCEDURE — 99233 SBSQ HOSP IP/OBS HIGH 50: CPT | Performed by: INTERNAL MEDICINE

## 2020-01-01 PROCEDURE — 12000001 ZZH R&B MED SURG/OB UMMC

## 2020-01-01 PROCEDURE — 97110 THERAPEUTIC EXERCISES: CPT | Mod: GP | Performed by: STUDENT IN AN ORGANIZED HEALTH CARE EDUCATION/TRAINING PROGRAM

## 2020-01-01 PROCEDURE — 85018 HEMOGLOBIN: CPT | Performed by: STUDENT IN AN ORGANIZED HEALTH CARE EDUCATION/TRAINING PROGRAM

## 2020-01-01 PROCEDURE — 99207 ZZC CDG-CHARGE REQUIRED MANUAL ENTRY: CPT | Performed by: INTERNAL MEDICINE

## 2020-01-01 PROCEDURE — 40001072 ZZH STATISTICAL VASC ACCESS NURSE TIME, 16-31 MINUTES

## 2020-01-01 PROCEDURE — 82330 ASSAY OF CALCIUM: CPT | Performed by: STUDENT IN AN ORGANIZED HEALTH CARE EDUCATION/TRAINING PROGRAM

## 2020-01-01 PROCEDURE — 25000131 ZZH RX MED GY IP 250 OP 636 PS 637: Performed by: STUDENT IN AN ORGANIZED HEALTH CARE EDUCATION/TRAINING PROGRAM

## 2020-01-01 PROCEDURE — 99233 SBSQ HOSP IP/OBS HIGH 50: CPT | Mod: GC | Performed by: INTERNAL MEDICINE

## 2020-01-01 PROCEDURE — 25800030 ZZH RX IP 258 OP 636: Performed by: STUDENT IN AN ORGANIZED HEALTH CARE EDUCATION/TRAINING PROGRAM

## 2020-01-01 PROCEDURE — 84300 ASSAY OF URINE SODIUM: CPT | Performed by: STUDENT IN AN ORGANIZED HEALTH CARE EDUCATION/TRAINING PROGRAM

## 2020-01-01 PROCEDURE — 82140 ASSAY OF AMMONIA: CPT | Performed by: STUDENT IN AN ORGANIZED HEALTH CARE EDUCATION/TRAINING PROGRAM

## 2020-01-01 PROCEDURE — 93005 ELECTROCARDIOGRAM TRACING: CPT

## 2020-01-01 PROCEDURE — 99233 SBSQ HOSP IP/OBS HIGH 50: CPT | Mod: GC | Performed by: HOSPITALIST

## 2020-01-01 PROCEDURE — 99207 ZZC CDG-MDM COMPONENT: MEETS MODERATE - UP CODED: CPT | Performed by: HOSPITALIST

## 2020-01-01 PROCEDURE — 85384 FIBRINOGEN ACTIVITY: CPT | Performed by: STUDENT IN AN ORGANIZED HEALTH CARE EDUCATION/TRAINING PROGRAM

## 2020-01-01 PROCEDURE — 97535 SELF CARE MNGMENT TRAINING: CPT | Mod: GO

## 2020-01-01 PROCEDURE — 25000132 ZZH RX MED GY IP 250 OP 250 PS 637: Performed by: INTERNAL MEDICINE

## 2020-01-01 PROCEDURE — C9113 INJ PANTOPRAZOLE SODIUM, VIA: HCPCS | Performed by: STUDENT IN AN ORGANIZED HEALTH CARE EDUCATION/TRAINING PROGRAM

## 2020-01-01 PROCEDURE — 99231 SBSQ HOSP IP/OBS SF/LOW 25: CPT | Mod: GC | Performed by: INTERNAL MEDICINE

## 2020-01-01 PROCEDURE — 80048 BASIC METABOLIC PNL TOTAL CA: CPT | Performed by: HOSPITALIST

## 2020-01-01 PROCEDURE — 97535 SELF CARE MNGMENT TRAINING: CPT | Mod: GO | Performed by: OCCUPATIONAL THERAPIST

## 2020-01-01 PROCEDURE — 36415 COLL VENOUS BLD VENIPUNCTURE: CPT

## 2020-01-01 PROCEDURE — 25000132 ZZH RX MED GY IP 250 OP 250 PS 637

## 2020-01-01 PROCEDURE — 12000004 ZZH R&B IMCU UMMC

## 2020-01-01 PROCEDURE — 25000125 ZZHC RX 250: Performed by: STUDENT IN AN ORGANIZED HEALTH CARE EDUCATION/TRAINING PROGRAM

## 2020-01-01 PROCEDURE — 25000128 H RX IP 250 OP 636: Performed by: INTERNAL MEDICINE

## 2020-01-01 PROCEDURE — 85049 AUTOMATED PLATELET COUNT: CPT | Performed by: STUDENT IN AN ORGANIZED HEALTH CARE EDUCATION/TRAINING PROGRAM

## 2020-01-01 PROCEDURE — 25800025 ZZH RX 258: Performed by: STUDENT IN AN ORGANIZED HEALTH CARE EDUCATION/TRAINING PROGRAM

## 2020-01-01 PROCEDURE — 25000125 ZZHC RX 250: Performed by: PHYSICIAN ASSISTANT

## 2020-01-01 PROCEDURE — 25000128 H RX IP 250 OP 636: Performed by: HOSPITALIST

## 2020-01-01 PROCEDURE — 82570 ASSAY OF URINE CREATININE: CPT | Performed by: STUDENT IN AN ORGANIZED HEALTH CARE EDUCATION/TRAINING PROGRAM

## 2020-01-01 PROCEDURE — P9047 ALBUMIN (HUMAN), 25%, 50ML: HCPCS | Performed by: STUDENT IN AN ORGANIZED HEALTH CARE EDUCATION/TRAINING PROGRAM

## 2020-01-01 PROCEDURE — 85610 PROTHROMBIN TIME: CPT | Performed by: STUDENT IN AN ORGANIZED HEALTH CARE EDUCATION/TRAINING PROGRAM

## 2020-01-01 PROCEDURE — 97110 THERAPEUTIC EXERCISES: CPT | Mod: GP

## 2020-01-01 PROCEDURE — 74018 RADEX ABDOMEN 1 VIEW: CPT

## 2020-01-01 PROCEDURE — 76700 US EXAM ABDOM COMPLETE: CPT

## 2020-01-01 PROCEDURE — 82248 BILIRUBIN DIRECT: CPT | Performed by: STUDENT IN AN ORGANIZED HEALTH CARE EDUCATION/TRAINING PROGRAM

## 2020-01-01 PROCEDURE — 40000556 ZZH STATISTIC PERIPHERAL IV START W US GUIDANCE

## 2020-01-01 PROCEDURE — 49083 ABD PARACENTESIS W/IMAGING: CPT

## 2020-01-01 PROCEDURE — 36415 COLL VENOUS BLD VENIPUNCTURE: CPT | Performed by: HOSPITALIST

## 2020-01-01 PROCEDURE — 83036 HEMOGLOBIN GLYCOSYLATED A1C: CPT | Performed by: STUDENT IN AN ORGANIZED HEALTH CARE EDUCATION/TRAINING PROGRAM

## 2020-01-01 PROCEDURE — 25000128 H RX IP 250 OP 636

## 2020-01-01 PROCEDURE — 25800030 ZZH RX IP 258 OP 636: Performed by: PEDIATRICS

## 2020-01-01 PROCEDURE — 80053 COMPREHEN METABOLIC PANEL: CPT | Performed by: INTERNAL MEDICINE

## 2020-01-01 PROCEDURE — 80048 BASIC METABOLIC PNL TOTAL CA: CPT | Performed by: STUDENT IN AN ORGANIZED HEALTH CARE EDUCATION/TRAINING PROGRAM

## 2020-01-01 PROCEDURE — 97530 THERAPEUTIC ACTIVITIES: CPT | Mod: GP

## 2020-01-01 PROCEDURE — 99232 SBSQ HOSP IP/OBS MODERATE 35: CPT | Performed by: INTERNAL MEDICINE

## 2020-01-01 PROCEDURE — P9047 ALBUMIN (HUMAN), 25%, 50ML: HCPCS | Performed by: PHYSICIAN ASSISTANT

## 2020-01-01 PROCEDURE — 25000128 H RX IP 250 OP 636: Performed by: NURSE PRACTITIONER

## 2020-01-01 PROCEDURE — 97110 THERAPEUTIC EXERCISES: CPT | Mod: GO

## 2020-01-01 PROCEDURE — 0DJ08ZZ INSPECTION OF UPPER INTESTINAL TRACT, VIA NATURAL OR ARTIFICIAL OPENING ENDOSCOPIC: ICD-10-PCS | Performed by: HOSPITALIST

## 2020-01-01 PROCEDURE — 84100 ASSAY OF PHOSPHORUS: CPT | Performed by: INTERNAL MEDICINE

## 2020-01-01 PROCEDURE — 00000155 ZZHCL STATISTIC H-CELL BLOCK W/STAIN: Performed by: STUDENT IN AN ORGANIZED HEALTH CARE EDUCATION/TRAINING PROGRAM

## 2020-01-01 PROCEDURE — 97110 THERAPEUTIC EXERCISES: CPT | Mod: GP | Performed by: PHYSICAL THERAPIST

## 2020-01-01 PROCEDURE — U0003 INFECTIOUS AGENT DETECTION BY NUCLEIC ACID (DNA OR RNA); SEVERE ACUTE RESPIRATORY SYNDROME CORONAVIRUS 2 (SARS-COV-2) (CORONAVIRUS DISEASE [COVID-19]), AMPLIFIED PROBE TECHNIQUE, MAKING USE OF HIGH THROUGHPUT TECHNOLOGIES AS DESCRIBED BY CMS-2020-01-R: HCPCS | Performed by: STUDENT IN AN ORGANIZED HEALTH CARE EDUCATION/TRAINING PROGRAM

## 2020-01-01 PROCEDURE — 87070 CULTURE OTHR SPECIMN AEROBIC: CPT | Performed by: STUDENT IN AN ORGANIZED HEALTH CARE EDUCATION/TRAINING PROGRAM

## 2020-01-01 PROCEDURE — 97165 OT EVAL LOW COMPLEX 30 MIN: CPT | Mod: GO

## 2020-01-01 PROCEDURE — 25800030 ZZH RX IP 258 OP 636: Performed by: HOSPITALIST

## 2020-01-01 PROCEDURE — 99232 SBSQ HOSP IP/OBS MODERATE 35: CPT | Mod: GC | Performed by: INTERNAL MEDICINE

## 2020-01-01 PROCEDURE — 99291 CRITICAL CARE FIRST HOUR: CPT | Performed by: INTERNAL MEDICINE

## 2020-01-01 PROCEDURE — 83540 ASSAY OF IRON: CPT | Performed by: STUDENT IN AN ORGANIZED HEALTH CARE EDUCATION/TRAINING PROGRAM

## 2020-01-01 PROCEDURE — 0W9G3ZZ DRAINAGE OF PERITONEAL CAVITY, PERCUTANEOUS APPROACH: ICD-10-PCS | Performed by: PHYSICIAN ASSISTANT

## 2020-01-01 PROCEDURE — 82803 BLOOD GASES ANY COMBINATION: CPT | Performed by: HOSPITALIST

## 2020-01-01 PROCEDURE — 20000004 ZZH R&B ICU UMMC

## 2020-01-01 PROCEDURE — 97530 THERAPEUTIC ACTIVITIES: CPT | Mod: GP | Performed by: PHYSICAL THERAPIST

## 2020-01-01 PROCEDURE — 99232 SBSQ HOSP IP/OBS MODERATE 35: CPT | Mod: GC | Performed by: PEDIATRICS

## 2020-01-01 PROCEDURE — 97161 PT EVAL LOW COMPLEX 20 MIN: CPT | Mod: GP | Performed by: PHYSICAL THERAPIST

## 2020-01-01 PROCEDURE — 89051 BODY FLUID CELL COUNT: CPT | Performed by: STUDENT IN AN ORGANIZED HEALTH CARE EDUCATION/TRAINING PROGRAM

## 2020-01-01 PROCEDURE — 87493 C DIFF AMPLIFIED PROBE: CPT | Performed by: STUDENT IN AN ORGANIZED HEALTH CARE EDUCATION/TRAINING PROGRAM

## 2020-01-01 PROCEDURE — 86901 BLOOD TYPING SEROLOGIC RH(D): CPT | Performed by: STUDENT IN AN ORGANIZED HEALTH CARE EDUCATION/TRAINING PROGRAM

## 2020-01-01 PROCEDURE — 82803 BLOOD GASES ANY COMBINATION: CPT | Performed by: STUDENT IN AN ORGANIZED HEALTH CARE EDUCATION/TRAINING PROGRAM

## 2020-01-01 PROCEDURE — 82247 BILIRUBIN TOTAL: CPT | Performed by: STUDENT IN AN ORGANIZED HEALTH CARE EDUCATION/TRAINING PROGRAM

## 2020-01-01 PROCEDURE — 25000128 H RX IP 250 OP 636: Performed by: PEDIATRICS

## 2020-01-01 PROCEDURE — 83735 ASSAY OF MAGNESIUM: CPT | Performed by: STUDENT IN AN ORGANIZED HEALTH CARE EDUCATION/TRAINING PROGRAM

## 2020-01-01 PROCEDURE — 85027 COMPLETE CBC AUTOMATED: CPT | Performed by: HOSPITALIST

## 2020-01-01 PROCEDURE — 85027 COMPLETE CBC AUTOMATED: CPT | Performed by: INTERNAL MEDICINE

## 2020-01-01 PROCEDURE — 00000102 ZZHCL STATISTIC CYTO WRIGHT STAIN TC: Performed by: STUDENT IN AN ORGANIZED HEALTH CARE EDUCATION/TRAINING PROGRAM

## 2020-01-01 PROCEDURE — 99291 CRITICAL CARE FIRST HOUR: CPT | Mod: GC | Performed by: INTERNAL MEDICINE

## 2020-01-01 PROCEDURE — 97110 THERAPEUTIC EXERCISES: CPT | Mod: GO | Performed by: OCCUPATIONAL THERAPIST

## 2020-01-01 PROCEDURE — 80053 COMPREHEN METABOLIC PANEL: CPT | Performed by: HOSPITALIST

## 2020-01-01 PROCEDURE — 84155 ASSAY OF PROTEIN SERUM: CPT | Performed by: STUDENT IN AN ORGANIZED HEALTH CARE EDUCATION/TRAINING PROGRAM

## 2020-01-01 PROCEDURE — P9016 RBC LEUKOCYTES REDUCED: HCPCS | Performed by: STUDENT IN AN ORGANIZED HEALTH CARE EDUCATION/TRAINING PROGRAM

## 2020-01-01 PROCEDURE — 84450 TRANSFERASE (AST) (SGOT): CPT | Performed by: STUDENT IN AN ORGANIZED HEALTH CARE EDUCATION/TRAINING PROGRAM

## 2020-01-01 PROCEDURE — P9047 ALBUMIN (HUMAN), 25%, 50ML: HCPCS | Performed by: HOSPITALIST

## 2020-01-01 PROCEDURE — 85018 HEMOGLOBIN: CPT | Performed by: HOSPITALIST

## 2020-01-01 PROCEDURE — 99232 SBSQ HOSP IP/OBS MODERATE 35: CPT | Mod: GC | Performed by: HOSPITALIST

## 2020-01-01 PROCEDURE — 86923 COMPATIBILITY TEST ELECTRIC: CPT | Performed by: STUDENT IN AN ORGANIZED HEALTH CARE EDUCATION/TRAINING PROGRAM

## 2020-01-01 PROCEDURE — 99356 ZZC PROLONGED SERV,INPATIENT,1ST HR: CPT | Performed by: INTERNAL MEDICINE

## 2020-01-01 PROCEDURE — 87075 CULTR BACTERIA EXCEPT BLOOD: CPT | Performed by: STUDENT IN AN ORGANIZED HEALTH CARE EDUCATION/TRAINING PROGRAM

## 2020-01-01 PROCEDURE — 85384 FIBRINOGEN ACTIVITY: CPT | Performed by: NURSE PRACTITIONER

## 2020-01-01 PROCEDURE — 93010 ELECTROCARDIOGRAM REPORT: CPT | Performed by: INTERNAL MEDICINE

## 2020-01-01 PROCEDURE — 88112 CYTOPATH CELL ENHANCE TECH: CPT | Performed by: STUDENT IN AN ORGANIZED HEALTH CARE EDUCATION/TRAINING PROGRAM

## 2020-01-01 PROCEDURE — 87015 SPECIMEN INFECT AGNT CONCNTJ: CPT | Performed by: STUDENT IN AN ORGANIZED HEALTH CARE EDUCATION/TRAINING PROGRAM

## 2020-01-01 PROCEDURE — 99233 SBSQ HOSP IP/OBS HIGH 50: CPT | Mod: GC | Performed by: PEDIATRICS

## 2020-01-01 PROCEDURE — 99223 1ST HOSP IP/OBS HIGH 75: CPT | Mod: AI | Performed by: INTERNAL MEDICINE

## 2020-01-01 PROCEDURE — 88305 TISSUE EXAM BY PATHOLOGIST: CPT | Performed by: STUDENT IN AN ORGANIZED HEALTH CARE EDUCATION/TRAINING PROGRAM

## 2020-01-01 PROCEDURE — 36556 INSERT NON-TUNNEL CV CATH: CPT | Performed by: INTERNAL MEDICINE

## 2020-01-01 PROCEDURE — 84460 ALANINE AMINO (ALT) (SGPT): CPT | Performed by: STUDENT IN AN ORGANIZED HEALTH CARE EDUCATION/TRAINING PROGRAM

## 2020-01-01 PROCEDURE — 25000131 ZZH RX MED GY IP 250 OP 636 PS 637: Performed by: INTERNAL MEDICINE

## 2020-01-01 PROCEDURE — 83735 ASSAY OF MAGNESIUM: CPT | Performed by: HOSPITALIST

## 2020-01-01 PROCEDURE — 40000141 ZZH STATISTIC PERIPHERAL IV START W/O US GUIDANCE

## 2020-01-01 PROCEDURE — 85610 PROTHROMBIN TIME: CPT | Performed by: HOSPITALIST

## 2020-01-01 PROCEDURE — 87205 SMEAR GRAM STAIN: CPT | Performed by: STUDENT IN AN ORGANIZED HEALTH CARE EDUCATION/TRAINING PROGRAM

## 2020-01-01 PROCEDURE — 83605 ASSAY OF LACTIC ACID: CPT | Performed by: INTERNAL MEDICINE

## 2020-01-01 PROCEDURE — 86900 BLOOD TYPING SEROLOGIC ABO: CPT | Performed by: STUDENT IN AN ORGANIZED HEALTH CARE EDUCATION/TRAINING PROGRAM

## 2020-01-01 PROCEDURE — 43235 EGD DIAGNOSTIC BRUSH WASH: CPT | Performed by: INTERNAL MEDICINE

## 2020-01-01 PROCEDURE — 97530 THERAPEUTIC ACTIVITIES: CPT | Mod: GP | Performed by: STUDENT IN AN ORGANIZED HEALTH CARE EDUCATION/TRAINING PROGRAM

## 2020-01-01 PROCEDURE — 81001 URINALYSIS AUTO W/SCOPE: CPT | Performed by: HOSPITALIST

## 2020-01-01 PROCEDURE — 87106 FUNGI IDENTIFICATION YEAST: CPT | Performed by: INTERNAL MEDICINE

## 2020-01-01 PROCEDURE — 84157 ASSAY OF PROTEIN OTHER: CPT | Performed by: STUDENT IN AN ORGANIZED HEALTH CARE EDUCATION/TRAINING PROGRAM

## 2020-01-01 PROCEDURE — 97530 THERAPEUTIC ACTIVITIES: CPT | Mod: GO

## 2020-01-01 PROCEDURE — 27211039 IR PARACENTESIS

## 2020-01-01 PROCEDURE — 84443 ASSAY THYROID STIM HORMONE: CPT | Performed by: STUDENT IN AN ORGANIZED HEALTH CARE EDUCATION/TRAINING PROGRAM

## 2020-01-01 PROCEDURE — 76705 ECHO EXAM OF ABDOMEN: CPT

## 2020-01-01 PROCEDURE — P9037 PLATE PHERES LEUKOREDU IRRAD: HCPCS

## 2020-01-01 PROCEDURE — 84100 ASSAY OF PHOSPHORUS: CPT | Performed by: STUDENT IN AN ORGANIZED HEALTH CARE EDUCATION/TRAINING PROGRAM

## 2020-01-01 PROCEDURE — 82042 OTHER SOURCE ALBUMIN QUAN EA: CPT | Performed by: STUDENT IN AN ORGANIZED HEALTH CARE EDUCATION/TRAINING PROGRAM

## 2020-01-01 PROCEDURE — 97530 THERAPEUTIC ACTIVITIES: CPT | Mod: GO | Performed by: OCCUPATIONAL THERAPIST

## 2020-01-01 PROCEDURE — 80069 RENAL FUNCTION PANEL: CPT | Performed by: STUDENT IN AN ORGANIZED HEALTH CARE EDUCATION/TRAINING PROGRAM

## 2020-01-01 PROCEDURE — 84156 ASSAY OF PROTEIN URINE: CPT | Performed by: STUDENT IN AN ORGANIZED HEALTH CARE EDUCATION/TRAINING PROGRAM

## 2020-01-01 PROCEDURE — 87186 SC STD MICRODIL/AGAR DIL: CPT | Performed by: INTERNAL MEDICINE

## 2020-01-01 PROCEDURE — 92610 EVALUATE SWALLOWING FUNCTION: CPT | Mod: GN

## 2020-01-01 PROCEDURE — 87086 URINE CULTURE/COLONY COUNT: CPT | Performed by: INTERNAL MEDICINE

## 2020-01-01 PROCEDURE — 85610 PROTHROMBIN TIME: CPT | Performed by: INTERNAL MEDICINE

## 2020-01-01 PROCEDURE — 99233 SBSQ HOSP IP/OBS HIGH 50: CPT | Mod: 25 | Performed by: INTERNAL MEDICINE

## 2020-01-01 PROCEDURE — 83550 IRON BINDING TEST: CPT | Performed by: STUDENT IN AN ORGANIZED HEALTH CARE EDUCATION/TRAINING PROGRAM

## 2020-01-01 PROCEDURE — 40000333 IR FOLLOW UP VISIT INPATIENT

## 2020-01-01 PROCEDURE — 99238 HOSP IP/OBS DSCHRG MGMT 30/<: CPT | Mod: GC | Performed by: INTERNAL MEDICINE

## 2020-01-01 PROCEDURE — 0WJG3ZZ INSPECTION OF PERITONEAL CAVITY, PERCUTANEOUS APPROACH: ICD-10-PCS | Performed by: PHYSICIAN ASSISTANT

## 2020-01-01 PROCEDURE — 85018 HEMOGLOBIN: CPT | Performed by: INTERNAL MEDICINE

## 2020-01-01 PROCEDURE — 87088 URINE BACTERIA CULTURE: CPT | Performed by: INTERNAL MEDICINE

## 2020-01-01 PROCEDURE — 25000132 ZZH RX MED GY IP 250 OP 250 PS 637: Performed by: HOSPITALIST

## 2020-01-01 PROCEDURE — 82728 ASSAY OF FERRITIN: CPT | Performed by: STUDENT IN AN ORGANIZED HEALTH CARE EDUCATION/TRAINING PROGRAM

## 2020-01-01 PROCEDURE — 84466 ASSAY OF TRANSFERRIN: CPT | Performed by: STUDENT IN AN ORGANIZED HEALTH CARE EDUCATION/TRAINING PROGRAM

## 2020-01-01 PROCEDURE — 83735 ASSAY OF MAGNESIUM: CPT | Performed by: INTERNAL MEDICINE

## 2020-01-01 PROCEDURE — 0W9G3ZZ DRAINAGE OF PERITONEAL CAVITY, PERCUTANEOUS APPROACH: ICD-10-PCS | Performed by: RADIOLOGY

## 2020-01-01 PROCEDURE — 84075 ASSAY ALKALINE PHOSPHATASE: CPT | Performed by: STUDENT IN AN ORGANIZED HEALTH CARE EDUCATION/TRAINING PROGRAM

## 2020-01-01 PROCEDURE — 99231 SBSQ HOSP IP/OBS SF/LOW 25: CPT | Performed by: INTERNAL MEDICINE

## 2020-01-01 PROCEDURE — 36415 COLL VENOUS BLD VENIPUNCTURE: CPT | Performed by: INTERNAL MEDICINE

## 2020-01-01 PROCEDURE — 40000986 XR CHEST PORT 1 VW

## 2020-01-01 PROCEDURE — 81001 URINALYSIS AUTO W/SCOPE: CPT | Performed by: STUDENT IN AN ORGANIZED HEALTH CARE EDUCATION/TRAINING PROGRAM

## 2020-01-01 PROCEDURE — 83605 ASSAY OF LACTIC ACID: CPT | Performed by: HOSPITALIST

## 2020-01-01 PROCEDURE — 99223 1ST HOSP IP/OBS HIGH 75: CPT | Performed by: INTERNAL MEDICINE

## 2020-01-01 PROCEDURE — 82330 ASSAY OF CALCIUM: CPT | Performed by: HOSPITALIST

## 2020-01-01 PROCEDURE — 25000128 H RX IP 250 OP 636: Performed by: PHYSICIAN ASSISTANT

## 2020-01-01 PROCEDURE — 71045 X-RAY EXAM CHEST 1 VIEW: CPT

## 2020-01-01 PROCEDURE — 86850 RBC ANTIBODY SCREEN: CPT | Performed by: STUDENT IN AN ORGANIZED HEALTH CARE EDUCATION/TRAINING PROGRAM

## 2020-01-01 RX ORDER — LIDOCAINE HYDROCHLORIDE 10 MG/ML
1-30 INJECTION, SOLUTION EPIDURAL; INFILTRATION; INTRACAUDAL; PERINEURAL
Status: DISCONTINUED | OUTPATIENT
Start: 2020-01-01 | End: 2020-01-01 | Stop reason: CLARIF

## 2020-01-01 RX ORDER — FLUCONAZOLE 200 MG/1
200 TABLET ORAL ONCE
Status: DISCONTINUED | OUTPATIENT
Start: 2020-01-01 | End: 2020-01-01

## 2020-01-01 RX ORDER — ACETAMINOPHEN 650 MG/1
650 SUPPOSITORY RECTAL EVERY 4 HOURS PRN
Status: DISCONTINUED | OUTPATIENT
Start: 2020-01-01 | End: 2020-08-25 | Stop reason: HOSPADM

## 2020-01-01 RX ORDER — FUROSEMIDE 10 MG/ML
120 INJECTION INTRAMUSCULAR; INTRAVENOUS ONCE
Status: COMPLETED | OUTPATIENT
Start: 2020-01-01 | End: 2020-01-01

## 2020-01-01 RX ORDER — ALUMINA, MAGNESIA, AND SIMETHICONE 2400; 2400; 240 MG/30ML; MG/30ML; MG/30ML
30 SUSPENSION ORAL ONCE
Status: COMPLETED | OUTPATIENT
Start: 2020-01-01 | End: 2020-01-01

## 2020-01-01 RX ORDER — LORAZEPAM 2 MG/ML
.5-1 INJECTION INTRAMUSCULAR EVERY 4 HOURS PRN
Status: DISCONTINUED | OUTPATIENT
Start: 2020-01-01 | End: 2020-01-01

## 2020-01-01 RX ORDER — MIDODRINE HYDROCHLORIDE 5 MG/1
10 TABLET ORAL
Status: DISCONTINUED | OUTPATIENT
Start: 2020-01-01 | End: 2020-01-01

## 2020-01-01 RX ORDER — FLUORIDE TOOTHPASTE
15 TOOTHPASTE DENTAL 4 TIMES DAILY PRN
Status: DISCONTINUED | OUTPATIENT
Start: 2020-01-01 | End: 2020-08-25 | Stop reason: HOSPADM

## 2020-01-01 RX ORDER — IPRATROPIUM BROMIDE 42 UG/1
2 SPRAY, METERED NASAL DAILY
COMMUNITY

## 2020-01-01 RX ORDER — TRAMADOL HYDROCHLORIDE 50 MG/1
50 TABLET ORAL EVERY 6 HOURS PRN
COMMUNITY
Start: 2020-01-01

## 2020-01-01 RX ORDER — PANTOPRAZOLE SODIUM 40 MG/1
40 TABLET, DELAYED RELEASE ORAL
Status: DISCONTINUED | OUTPATIENT
Start: 2020-01-01 | End: 2020-01-01

## 2020-01-01 RX ORDER — HYDROMORPHONE HCL/0.9% NACL/PF 0.2MG/0.2
.2-.5 SYRINGE (ML) INTRAVENOUS
Status: DISCONTINUED | OUTPATIENT
Start: 2020-01-01 | End: 2020-01-01

## 2020-01-01 RX ORDER — DEXTROSE MONOHYDRATE 25 G/50ML
25-50 INJECTION, SOLUTION INTRAVENOUS
Status: DISCONTINUED | OUTPATIENT
Start: 2020-01-01 | End: 2020-08-25 | Stop reason: HOSPADM

## 2020-01-01 RX ORDER — LINEZOLID 600 MG/1
600 TABLET, FILM COATED ORAL EVERY 12 HOURS SCHEDULED
Status: DISCONTINUED | OUTPATIENT
Start: 2020-01-01 | End: 2020-01-01

## 2020-01-01 RX ORDER — IRON POLYSACCHARIDE COMPLEX 150 MG
150 CAPSULE ORAL
COMMUNITY

## 2020-01-01 RX ORDER — LIDOCAINE 40 MG/G
CREAM TOPICAL
Status: DISCONTINUED | OUTPATIENT
Start: 2020-01-01 | End: 2020-01-01

## 2020-01-01 RX ORDER — HALOPERIDOL 2 MG/ML
1-2 SOLUTION ORAL EVERY 6 HOURS PRN
Status: DISCONTINUED | OUTPATIENT
Start: 2020-01-01 | End: 2020-01-01

## 2020-01-01 RX ORDER — HYDROXYZINE HYDROCHLORIDE 10 MG/1
10 TABLET, FILM COATED ORAL ONCE
Status: COMPLETED | OUTPATIENT
Start: 2020-01-01 | End: 2020-01-01

## 2020-01-01 RX ORDER — BISACODYL 10 MG
10 SUPPOSITORY, RECTAL RECTAL DAILY PRN
Status: DISCONTINUED | OUTPATIENT
Start: 2020-01-01 | End: 2020-01-01

## 2020-01-01 RX ORDER — LEVOTHYROXINE SODIUM 20 UG/ML
37.5 INJECTION, SOLUTION INTRAVENOUS DAILY
Status: DISCONTINUED | OUTPATIENT
Start: 2020-01-01 | End: 2020-01-01

## 2020-01-01 RX ORDER — NADOLOL 20 MG/1
20 TABLET ORAL AT BEDTIME
COMMUNITY
Start: 2020-01-01 | End: 2021-05-28

## 2020-01-01 RX ORDER — LACTULOSE 10 G/15ML
20 SOLUTION ORAL
Status: DISCONTINUED | OUTPATIENT
Start: 2020-01-01 | End: 2020-01-01

## 2020-01-01 RX ORDER — ATROPINE SULFATE 10 MG/ML
1-2 SOLUTION/ DROPS OPHTHALMIC
Status: DISCONTINUED | OUTPATIENT
Start: 2020-01-01 | End: 2020-08-25 | Stop reason: HOSPADM

## 2020-01-01 RX ORDER — ONDANSETRON 2 MG/ML
4 INJECTION INTRAMUSCULAR; INTRAVENOUS EVERY 6 HOURS PRN
Status: DISCONTINUED | OUTPATIENT
Start: 2020-01-01 | End: 2020-08-25 | Stop reason: HOSPADM

## 2020-01-01 RX ORDER — ACETAMINOPHEN 500 MG
1000 TABLET ORAL EVERY 8 HOURS PRN
COMMUNITY
Start: 2020-01-01

## 2020-01-01 RX ORDER — HYDROMORPHONE HCL/0.9% NACL/PF 0.2MG/0.2
0.2 SYRINGE (ML) INTRAVENOUS EVERY 4 HOURS PRN
Status: DISCONTINUED | OUTPATIENT
Start: 2020-01-01 | End: 2020-01-01

## 2020-01-01 RX ORDER — OXYBUTYNIN CHLORIDE 15 MG/1
15 TABLET, EXTENDED RELEASE ORAL DAILY
COMMUNITY
Start: 2020-01-01

## 2020-01-01 RX ORDER — LIDOCAINE 40 MG/G
CREAM TOPICAL
Status: DISCONTINUED | OUTPATIENT
Start: 2020-01-01 | End: 2020-08-25 | Stop reason: HOSPADM

## 2020-01-01 RX ORDER — SEVELAMER CARBONATE 800 MG/1
1600 TABLET, FILM COATED ORAL
COMMUNITY
Start: 2019-06-11

## 2020-01-01 RX ORDER — HYDROMORPHONE HYDROCHLORIDE 1 MG/ML
0.5 INJECTION, SOLUTION INTRAMUSCULAR; INTRAVENOUS; SUBCUTANEOUS ONCE
Status: COMPLETED | OUTPATIENT
Start: 2020-01-01 | End: 2020-01-01

## 2020-01-01 RX ORDER — LACTULOSE 10 G/10G
20 SOLUTION ORAL 2 TIMES DAILY
Status: DISCONTINUED | OUTPATIENT
Start: 2020-01-01 | End: 2020-01-01

## 2020-01-01 RX ORDER — SODIUM BICARBONATE 650 MG/1
1300 TABLET ORAL 2 TIMES DAILY
Status: DISCONTINUED | OUTPATIENT
Start: 2020-01-01 | End: 2020-01-01

## 2020-01-01 RX ORDER — OXYCODONE HCL 5 MG/5 ML
2.5-5 SOLUTION, ORAL ORAL
Status: DISCONTINUED | OUTPATIENT
Start: 2020-01-01 | End: 2020-08-25 | Stop reason: HOSPADM

## 2020-01-01 RX ORDER — LACTULOSE 10 G/15ML
100 SOLUTION ORAL
Status: CANCELLED | OUTPATIENT
Start: 2020-01-01

## 2020-01-01 RX ORDER — GABAPENTIN 300 MG/1
300 CAPSULE ORAL AT BEDTIME
Status: DISCONTINUED | OUTPATIENT
Start: 2020-01-01 | End: 2020-01-01

## 2020-01-01 RX ORDER — ALBUMIN (HUMAN) 12.5 G/50ML
25 SOLUTION INTRAVENOUS ONCE
Status: COMPLETED | OUTPATIENT
Start: 2020-01-01 | End: 2020-01-01

## 2020-01-01 RX ORDER — MIDODRINE HYDROCHLORIDE 5 MG/1
5 TABLET ORAL
Status: DISCONTINUED | OUTPATIENT
Start: 2020-01-01 | End: 2020-01-01

## 2020-01-01 RX ORDER — BUMETANIDE 0.5 MG/1
2 TABLET ORAL DAILY
Status: DISCONTINUED | OUTPATIENT
Start: 2020-01-01 | End: 2020-01-01

## 2020-01-01 RX ORDER — HYDROMORPHONE HCL/0.9% NACL/PF 0.2MG/0.2
0.2 SYRINGE (ML) INTRAVENOUS ONCE
Status: COMPLETED | OUTPATIENT
Start: 2020-01-01 | End: 2020-01-01

## 2020-01-01 RX ORDER — INSULIN ASPART 100 [IU]/ML
2 INJECTION, SOLUTION INTRAVENOUS; SUBCUTANEOUS
COMMUNITY

## 2020-01-01 RX ORDER — PREDNISONE 5 MG/1
5 TABLET ORAL DAILY
Status: DISCONTINUED | OUTPATIENT
Start: 2020-01-01 | End: 2020-01-01

## 2020-01-01 RX ORDER — LINEZOLID 600 MG/1
600 TABLET, FILM COATED ORAL ONCE
Status: COMPLETED | OUTPATIENT
Start: 2020-01-01 | End: 2020-01-01

## 2020-01-01 RX ORDER — ACETAMINOPHEN 500 MG
500 TABLET ORAL EVERY 6 HOURS PRN
Status: DISCONTINUED | OUTPATIENT
Start: 2020-01-01 | End: 2020-01-01

## 2020-01-01 RX ORDER — LACTULOSE 10 G/15ML
100 SOLUTION ORAL
Status: DISCONTINUED | OUTPATIENT
Start: 2020-01-01 | End: 2020-01-01

## 2020-01-01 RX ORDER — VANCOMYCIN HYDROCHLORIDE 125 MG/1
125 CAPSULE ORAL 4 TIMES DAILY
Status: DISCONTINUED | OUTPATIENT
Start: 2020-01-01 | End: 2020-01-01

## 2020-01-01 RX ORDER — OXYCODONE HCL 5 MG/5 ML
2.5-5 SOLUTION, ORAL ORAL
Status: DISCONTINUED | OUTPATIENT
Start: 2020-01-01 | End: 2020-01-01

## 2020-01-01 RX ORDER — METHYLPREDNISOLONE SODIUM SUCCINATE 40 MG/ML
4 INJECTION, POWDER, LYOPHILIZED, FOR SOLUTION INTRAMUSCULAR; INTRAVENOUS EVERY 24 HOURS
Status: DISCONTINUED | OUTPATIENT
Start: 2020-01-01 | End: 2020-01-01

## 2020-01-01 RX ORDER — SODIUM CHLORIDE, SODIUM LACTATE, POTASSIUM CHLORIDE, CALCIUM CHLORIDE 600; 310; 30; 20 MG/100ML; MG/100ML; MG/100ML; MG/100ML
INJECTION, SOLUTION INTRAVENOUS CONTINUOUS
Status: DISCONTINUED | OUTPATIENT
Start: 2020-01-01 | End: 2020-01-01

## 2020-01-01 RX ORDER — LORAZEPAM 2 MG/ML
.5-1 CONCENTRATE ORAL EVERY 4 HOURS PRN
Status: DISCONTINUED | OUTPATIENT
Start: 2020-01-01 | End: 2020-01-01

## 2020-01-01 RX ORDER — HYDROMORPHONE HYDROCHLORIDE 1 MG/ML
.3-.5 INJECTION, SOLUTION INTRAMUSCULAR; INTRAVENOUS; SUBCUTANEOUS EVERY 4 HOURS PRN
Status: DISCONTINUED | OUTPATIENT
Start: 2020-01-01 | End: 2020-01-01

## 2020-01-01 RX ORDER — LEVOTHYROXINE SODIUM 75 UG/1
75 TABLET ORAL DAILY
COMMUNITY

## 2020-01-01 RX ORDER — NICOTINE POLACRILEX 4 MG
15-30 LOZENGE BUCCAL
Status: DISCONTINUED | OUTPATIENT
Start: 2020-01-01 | End: 2020-08-25 | Stop reason: HOSPADM

## 2020-01-01 RX ORDER — CALCIUM CARBONATE 500 MG/1
500 TABLET, CHEWABLE ORAL 3 TIMES DAILY PRN
Status: DISCONTINUED | OUTPATIENT
Start: 2020-01-01 | End: 2020-01-01

## 2020-01-01 RX ORDER — GABAPENTIN 300 MG/1
300 CAPSULE ORAL AT BEDTIME
COMMUNITY

## 2020-01-01 RX ORDER — ACETAMINOPHEN 325 MG/1
650 TABLET ORAL EVERY 4 HOURS PRN
Status: DISCONTINUED | OUTPATIENT
Start: 2020-01-01 | End: 2020-08-25 | Stop reason: HOSPADM

## 2020-01-01 RX ORDER — CEFTRIAXONE 1 G/1
1 INJECTION, POWDER, FOR SOLUTION INTRAMUSCULAR; INTRAVENOUS EVERY 24 HOURS
Status: DISCONTINUED | OUTPATIENT
Start: 2020-01-01 | End: 2020-01-01

## 2020-01-01 RX ORDER — MAGNESIUM SULFATE 1 G/100ML
1 INJECTION INTRAVENOUS ONCE
Status: COMPLETED | OUTPATIENT
Start: 2020-01-01 | End: 2020-01-01

## 2020-01-01 RX ORDER — CHOLECALCIFEROL (VITAMIN D3) 50 MCG
1 TABLET ORAL DAILY
COMMUNITY

## 2020-01-01 RX ORDER — MAGNESIUM SULFATE HEPTAHYDRATE 40 MG/ML
2 INJECTION, SOLUTION INTRAVENOUS ONCE
Status: COMPLETED | OUTPATIENT
Start: 2020-01-01 | End: 2020-01-01

## 2020-01-01 RX ORDER — ALUMINA, MAGNESIA, AND SIMETHICONE 2400; 2400; 240 MG/30ML; MG/30ML; MG/30ML
30 SUSPENSION ORAL
Status: DISCONTINUED | OUTPATIENT
Start: 2020-01-01 | End: 2020-01-01

## 2020-01-01 RX ORDER — POLYETHYLENE GLYCOL 3350 17 G/17G
1 POWDER, FOR SOLUTION ORAL EVERY OTHER DAY
COMMUNITY
Start: 2020-01-01

## 2020-01-01 RX ORDER — SALIVA STIMULANT COMB. NO.3
1 SPRAY, NON-AEROSOL (ML) MUCOUS MEMBRANE
Status: DISCONTINUED | OUTPATIENT
Start: 2020-01-01 | End: 2020-08-25 | Stop reason: HOSPADM

## 2020-01-01 RX ORDER — LINEZOLID 2 MG/ML
600 INJECTION, SOLUTION INTRAVENOUS EVERY 12 HOURS
Status: DISCONTINUED | OUTPATIENT
Start: 2020-01-01 | End: 2020-01-01

## 2020-01-01 RX ORDER — ALLOPURINOL 300 MG/1
300 TABLET ORAL DAILY
COMMUNITY
Start: 2020-01-01

## 2020-01-01 RX ORDER — ONDANSETRON 2 MG/ML
4 INJECTION INTRAMUSCULAR; INTRAVENOUS EVERY 6 HOURS PRN
Status: DISCONTINUED | OUTPATIENT
Start: 2020-01-01 | End: 2020-01-01

## 2020-01-01 RX ORDER — DEXTROSE MONOHYDRATE 100 MG/ML
INJECTION, SOLUTION INTRAVENOUS CONTINUOUS
Status: ACTIVE | OUTPATIENT
Start: 2020-01-01 | End: 2020-01-01

## 2020-01-01 RX ORDER — IPRATROPIUM BROMIDE 42 UG/1
2 SPRAY, METERED NASAL DAILY
Status: DISCONTINUED | OUTPATIENT
Start: 2020-01-01 | End: 2020-01-01

## 2020-01-01 RX ORDER — LIDOCAINE HYDROCHLORIDE 10 MG/ML
1-30 INJECTION, SOLUTION EPIDURAL; INFILTRATION; INTRACAUDAL; PERINEURAL
Status: COMPLETED | OUTPATIENT
Start: 2020-01-01 | End: 2020-01-01

## 2020-01-01 RX ORDER — GABAPENTIN 300 MG/1
300 CAPSULE ORAL
Status: DISCONTINUED | OUTPATIENT
Start: 2020-01-01 | End: 2020-01-01

## 2020-01-01 RX ORDER — SODIUM BICARBONATE 650 MG/1
1300 TABLET ORAL 2 TIMES DAILY
COMMUNITY
Start: 2019-01-01

## 2020-01-01 RX ORDER — CYCLOBENZAPRINE HCL 5 MG
10 TABLET ORAL ONCE
Status: COMPLETED | OUTPATIENT
Start: 2020-01-01 | End: 2020-01-01

## 2020-01-01 RX ORDER — DEXTROSE MONOHYDRATE 100 MG/ML
INJECTION, SOLUTION INTRAVENOUS CONTINUOUS
Status: DISCONTINUED | OUTPATIENT
Start: 2020-01-01 | End: 2020-01-01

## 2020-01-01 RX ORDER — FUROSEMIDE 40 MG
120 TABLET ORAL ONCE
Status: DISCONTINUED | OUTPATIENT
Start: 2020-01-01 | End: 2020-01-01

## 2020-01-01 RX ORDER — OXYCODONE HYDROCHLORIDE 5 MG/1
5 TABLET ORAL EVERY 6 HOURS PRN
Status: DISCONTINUED | OUTPATIENT
Start: 2020-01-01 | End: 2020-01-01

## 2020-01-01 RX ORDER — CAPSAICIN 0.75 MG/G
CREAM TOPICAL 3 TIMES DAILY PRN
Status: DISCONTINUED | OUTPATIENT
Start: 2020-01-01 | End: 2020-08-25 | Stop reason: HOSPADM

## 2020-01-01 RX ORDER — SENNOSIDES 8.6 MG
1-2 TABLET ORAL DAILY PRN
Status: DISCONTINUED | OUTPATIENT
Start: 2020-01-01 | End: 2020-01-01

## 2020-01-01 RX ORDER — KETOROLAC TROMETHAMINE 10 MG/1
20 TABLET, FILM COATED ORAL ONCE
Status: DISCONTINUED | OUTPATIENT
Start: 2020-01-01 | End: 2020-01-01

## 2020-01-01 RX ORDER — HALOPERIDOL 2 MG/ML
1-2 SOLUTION ORAL EVERY 6 HOURS PRN
Status: DISCONTINUED | OUTPATIENT
Start: 2020-01-01 | End: 2020-08-25 | Stop reason: HOSPADM

## 2020-01-01 RX ORDER — PRAMIPEXOLE DIHYDROCHLORIDE 0.12 MG/1
0.12 TABLET ORAL AT BEDTIME
Status: DISCONTINUED | OUTPATIENT
Start: 2020-01-01 | End: 2020-01-01

## 2020-01-01 RX ORDER — NALOXONE HYDROCHLORIDE 0.4 MG/ML
.1-.4 INJECTION, SOLUTION INTRAMUSCULAR; INTRAVENOUS; SUBCUTANEOUS
Status: DISCONTINUED | OUTPATIENT
Start: 2020-01-01 | End: 2020-08-25 | Stop reason: HOSPADM

## 2020-01-01 RX ORDER — FENTANYL CITRATE 50 UG/ML
25 INJECTION, SOLUTION INTRAMUSCULAR; INTRAVENOUS EVERY 5 MIN PRN
Status: DISCONTINUED | OUTPATIENT
Start: 2020-01-01 | End: 2020-01-01 | Stop reason: CLARIF

## 2020-01-01 RX ORDER — ONDANSETRON 4 MG/1
4 TABLET, ORALLY DISINTEGRATING ORAL EVERY 6 HOURS PRN
Status: DISCONTINUED | OUTPATIENT
Start: 2020-01-01 | End: 2020-08-25 | Stop reason: HOSPADM

## 2020-01-01 RX ORDER — SEVELAMER CARBONATE 800 MG/1
1600 TABLET, FILM COATED ORAL
Status: DISCONTINUED | OUTPATIENT
Start: 2020-01-01 | End: 2020-01-01

## 2020-01-01 RX ORDER — CARBOXYMETHYLCELLULOSE SODIUM 5 MG/ML
1-2 SOLUTION/ DROPS OPHTHALMIC EVERY 8 HOURS PRN
Status: DISCONTINUED | OUTPATIENT
Start: 2020-01-01 | End: 2020-08-25 | Stop reason: HOSPADM

## 2020-01-01 RX ORDER — PRAMIPEXOLE DIHYDROCHLORIDE 0.12 MG/1
0.12 TABLET ORAL AT BEDTIME
COMMUNITY

## 2020-01-01 RX ORDER — PANTOPRAZOLE SODIUM 40 MG/1
40 TABLET, DELAYED RELEASE ORAL
Status: DISCONTINUED | OUTPATIENT
Start: 2020-01-01 | End: 2020-08-25 | Stop reason: HOSPADM

## 2020-01-01 RX ORDER — LIDOCAINE 4 G/G
1 PATCH TOPICAL
Status: DISCONTINUED | OUTPATIENT
Start: 2020-01-01 | End: 2020-08-25 | Stop reason: HOSPADM

## 2020-01-01 RX ORDER — HYDROMORPHONE HCL/0.9% NACL/PF 0.2MG/0.2
.2-.5 SYRINGE (ML) INTRAVENOUS
Status: DISCONTINUED | OUTPATIENT
Start: 2020-01-01 | End: 2020-08-25 | Stop reason: HOSPADM

## 2020-01-01 RX ORDER — HYDROXYZINE HYDROCHLORIDE 10 MG/1
10 TABLET, FILM COATED ORAL 3 TIMES DAILY PRN
Status: DISCONTINUED | OUTPATIENT
Start: 2020-01-01 | End: 2020-01-01

## 2020-01-01 RX ORDER — SIMETHICONE 80 MG
40 TABLET,CHEWABLE ORAL EVERY 6 HOURS PRN
Status: DISCONTINUED | OUTPATIENT
Start: 2020-01-01 | End: 2020-08-25 | Stop reason: HOSPADM

## 2020-01-01 RX ORDER — FENTANYL CITRATE 50 UG/ML
100 INJECTION, SOLUTION INTRAMUSCULAR; INTRAVENOUS
Status: COMPLETED | OUTPATIENT
Start: 2020-01-01 | End: 2020-01-01

## 2020-01-01 RX ORDER — POTASSIUM CHLORIDE 1500 MG/1
20 TABLET, EXTENDED RELEASE ORAL 2 TIMES DAILY
COMMUNITY
Start: 2020-01-01 | End: 2021-04-03

## 2020-01-01 RX ORDER — CALCITRIOL 0.25 UG/1
CAPSULE, LIQUID FILLED ORAL
COMMUNITY
Start: 2020-01-01

## 2020-01-01 RX ORDER — TAMSULOSIN HYDROCHLORIDE 0.4 MG/1
0.4 CAPSULE ORAL DAILY
COMMUNITY
Start: 2020-01-01

## 2020-01-01 RX ORDER — TAMSULOSIN HYDROCHLORIDE 0.4 MG/1
0.4 CAPSULE ORAL DAILY
Status: DISCONTINUED | OUTPATIENT
Start: 2020-01-01 | End: 2020-01-01

## 2020-01-01 RX ORDER — POLYETHYLENE GLYCOL 3350 17 G/17G
17 POWDER, FOR SOLUTION ORAL DAILY
Status: DISCONTINUED | OUTPATIENT
Start: 2020-01-01 | End: 2020-01-01

## 2020-01-01 RX ORDER — DEXTROSE MONOHYDRATE 25 G/50ML
25-50 INJECTION, SOLUTION INTRAVENOUS
Status: DISCONTINUED | OUTPATIENT
Start: 2020-01-01 | End: 2020-01-01

## 2020-01-01 RX ORDER — MIDODRINE HYDROCHLORIDE 5 MG/1
20 TABLET ORAL
Status: DISCONTINUED | OUTPATIENT
Start: 2020-01-01 | End: 2020-01-01

## 2020-01-01 RX ORDER — PREDNISONE 5 MG/1
5 TABLET ORAL DAILY
COMMUNITY
Start: 2020-01-01

## 2020-01-01 RX ORDER — TORSEMIDE 20 MG/1
60 TABLET ORAL 2 TIMES DAILY
COMMUNITY
Start: 2020-01-01

## 2020-01-01 RX ORDER — ALBUMIN (HUMAN) 12.5 G/50ML
50 SOLUTION INTRAVENOUS ONCE
Status: COMPLETED | OUTPATIENT
Start: 2020-01-01 | End: 2020-01-01

## 2020-01-01 RX ORDER — LORAZEPAM 2 MG/ML
.5-1 INJECTION INTRAMUSCULAR EVERY 4 HOURS PRN
Status: DISCONTINUED | OUTPATIENT
Start: 2020-01-01 | End: 2020-08-25 | Stop reason: HOSPADM

## 2020-01-01 RX ORDER — CEFTRIAXONE 2 G/1
2 INJECTION, POWDER, FOR SOLUTION INTRAMUSCULAR; INTRAVENOUS EVERY 24 HOURS
Status: DISCONTINUED | OUTPATIENT
Start: 2020-01-01 | End: 2020-01-01

## 2020-01-01 RX ORDER — BUMETANIDE 0.25 MG/ML
2 INJECTION INTRAMUSCULAR; INTRAVENOUS ONCE
Status: COMPLETED | OUTPATIENT
Start: 2020-01-01 | End: 2020-01-01

## 2020-01-01 RX ORDER — METHYLPREDNISOLONE SODIUM SUCCINATE 40 MG/ML
40 INJECTION, POWDER, LYOPHILIZED, FOR SOLUTION INTRAMUSCULAR; INTRAVENOUS EVERY 24 HOURS
Status: DISCONTINUED | OUTPATIENT
Start: 2020-01-01 | End: 2020-01-01

## 2020-01-01 RX ORDER — LACTULOSE 10 G/15ML
20 SOLUTION ORAL
Status: CANCELLED | OUTPATIENT
Start: 2020-01-01

## 2020-01-01 RX ORDER — OXYBUTYNIN CHLORIDE 5 MG/1
15 TABLET, EXTENDED RELEASE ORAL DAILY
Status: DISCONTINUED | OUTPATIENT
Start: 2020-01-01 | End: 2020-01-01

## 2020-01-01 RX ORDER — FLUCONAZOLE 2 MG/ML
200 INJECTION, SOLUTION INTRAVENOUS ONCE
Status: COMPLETED | OUTPATIENT
Start: 2020-01-01 | End: 2020-01-01

## 2020-01-01 RX ORDER — OCTREOTIDE ACETATE 50 UG/ML
50 INJECTION, SOLUTION INTRAVENOUS; SUBCUTANEOUS ONCE
Status: COMPLETED | OUTPATIENT
Start: 2020-01-01 | End: 2020-01-01

## 2020-01-01 RX ORDER — MIDODRINE HYDROCHLORIDE 5 MG/1
15 TABLET ORAL
Status: DISCONTINUED | OUTPATIENT
Start: 2020-01-01 | End: 2020-01-01

## 2020-01-01 RX ORDER — LEVOTHYROXINE SODIUM 75 UG/1
75 TABLET ORAL
Status: DISCONTINUED | OUTPATIENT
Start: 2020-01-01 | End: 2020-01-01

## 2020-01-01 RX ORDER — CEFTRIAXONE 1 G/1
1 INJECTION, POWDER, FOR SOLUTION INTRAMUSCULAR; INTRAVENOUS EVERY 24 HOURS
Status: COMPLETED | OUTPATIENT
Start: 2020-01-01 | End: 2020-01-01

## 2020-01-01 RX ORDER — LORAZEPAM 2 MG/ML
.5-1 CONCENTRATE ORAL EVERY 4 HOURS PRN
Status: DISCONTINUED | OUTPATIENT
Start: 2020-01-01 | End: 2020-08-25 | Stop reason: HOSPADM

## 2020-01-01 RX ORDER — BACILLUS COAGULANS 1B CELL
1 CAPSULE ORAL DAILY
COMMUNITY

## 2020-01-01 RX ORDER — LINEZOLID 2 MG/ML
600 INJECTION, SOLUTION INTRAVENOUS EVERY 12 HOURS
Status: COMPLETED | OUTPATIENT
Start: 2020-01-01 | End: 2020-01-01

## 2020-01-01 RX ORDER — NICOTINE POLACRILEX 4 MG
15-30 LOZENGE BUCCAL
Status: DISCONTINUED | OUTPATIENT
Start: 2020-01-01 | End: 2020-01-01

## 2020-01-01 RX ORDER — POLYETHYLENE GLYCOL 3350 17 G/17G
17 POWDER, FOR SOLUTION ORAL 2 TIMES DAILY
Status: DISCONTINUED | OUTPATIENT
Start: 2020-01-01 | End: 2020-01-01

## 2020-01-01 RX ORDER — CALCITRIOL 0.5 UG/1
0.5 CAPSULE, LIQUID FILLED ORAL
COMMUNITY

## 2020-01-01 RX ORDER — VITAMIN B COMPLEX
50 TABLET ORAL DAILY
Status: DISCONTINUED | OUTPATIENT
Start: 2020-01-01 | End: 2020-01-01

## 2020-01-01 RX ADMIN — MICONAZOLE NITRATE: 20 POWDER TOPICAL at 08:29

## 2020-01-01 RX ADMIN — VANCOMYCIN HYDROCHLORIDE 125 MG: 125 CAPSULE ORAL at 07:56

## 2020-01-01 RX ADMIN — PANTOPRAZOLE SODIUM 40 MG: 40 TABLET, DELAYED RELEASE ORAL at 16:55

## 2020-01-01 RX ADMIN — LIDOCAINE 1 PATCH: 560 PATCH PERCUTANEOUS; TOPICAL; TRANSDERMAL at 08:04

## 2020-01-01 RX ADMIN — SEVELAMER CARBONATE 1600 MG: 800 TABLET, FILM COATED ORAL at 08:09

## 2020-01-01 RX ADMIN — SODIUM CHLORIDE 500 ML: 9 INJECTION, SOLUTION INTRAVENOUS at 12:27

## 2020-01-01 RX ADMIN — BUMETANIDE 2 MG: 2 TABLET ORAL at 09:30

## 2020-01-01 RX ADMIN — SODIUM PHOSPHATE, MONOBASIC, MONOHYDRATE AND SODIUM PHOSPHATE, DIBASIC, ANHYDROUS 20 MMOL: 276; 142 INJECTION, SOLUTION INTRAVENOUS at 20:09

## 2020-01-01 RX ADMIN — RIFAXIMIN 550 MG: 550 TABLET ORAL at 20:27

## 2020-01-01 RX ADMIN — ALBUMIN HUMAN 25 G: 0.25 SOLUTION INTRAVENOUS at 17:31

## 2020-01-01 RX ADMIN — VANCOMYCIN HYDROCHLORIDE 125 MG: 125 CAPSULE ORAL at 11:32

## 2020-01-01 RX ADMIN — RIFAXIMIN 200 MG: 200 TABLET ORAL at 12:32

## 2020-01-01 RX ADMIN — INSULIN ASPART 1 UNITS: 100 INJECTION, SOLUTION INTRAVENOUS; SUBCUTANEOUS at 16:24

## 2020-01-01 RX ADMIN — MIDODRINE HYDROCHLORIDE 10 MG: 5 TABLET ORAL at 18:14

## 2020-01-01 RX ADMIN — Medication 2.5 MG: at 11:37

## 2020-01-01 RX ADMIN — PREDNISONE 5 MG: 5 TABLET ORAL at 15:19

## 2020-01-01 RX ADMIN — LINEZOLID 600 MG: 600 INJECTION, SOLUTION INTRAVENOUS at 20:26

## 2020-01-01 RX ADMIN — PANTOPRAZOLE SODIUM 40 MG: 40 TABLET, DELAYED RELEASE ORAL at 15:38

## 2020-01-01 RX ADMIN — LINEZOLID 600 MG: 600 TABLET, FILM COATED ORAL at 20:15

## 2020-01-01 RX ADMIN — INSULIN ASPART 2 UNITS: 100 INJECTION, SOLUTION INTRAVENOUS; SUBCUTANEOUS at 11:43

## 2020-01-01 RX ADMIN — CALCIUM CARBONATE (ANTACID) CHEW TAB 500 MG 500 MG: 500 CHEW TAB at 12:33

## 2020-01-01 RX ADMIN — SODIUM BICARBONATE 650 MG TABLET 1300 MG: at 19:28

## 2020-01-01 RX ADMIN — GABAPENTIN 300 MG: 300 CAPSULE ORAL at 21:51

## 2020-01-01 RX ADMIN — MIDODRINE HYDROCHLORIDE 10 MG: 5 TABLET ORAL at 14:44

## 2020-01-01 RX ADMIN — RIFAXIMIN 550 MG: 550 TABLET ORAL at 20:46

## 2020-01-01 RX ADMIN — RIFAXIMIN 550 MG: 550 TABLET ORAL at 19:22

## 2020-01-01 RX ADMIN — Medication 0.5 MG: at 18:53

## 2020-01-01 RX ADMIN — MICONAZOLE NITRATE: 20 POWDER TOPICAL at 08:40

## 2020-01-01 RX ADMIN — VANCOMYCIN HYDROCHLORIDE 125 MG: 125 CAPSULE ORAL at 09:31

## 2020-01-01 RX ADMIN — ONDANSETRON 4 MG: 2 INJECTION INTRAMUSCULAR; INTRAVENOUS at 19:31

## 2020-01-01 RX ADMIN — MIDODRINE HYDROCHLORIDE 10 MG: 5 TABLET ORAL at 14:42

## 2020-01-01 RX ADMIN — INSULIN ASPART 1 UNITS: 100 INJECTION, SOLUTION INTRAVENOUS; SUBCUTANEOUS at 18:15

## 2020-01-01 RX ADMIN — Medication 2.5 MG: at 21:58

## 2020-01-01 RX ADMIN — MIDODRINE HYDROCHLORIDE 10 MG: 5 TABLET ORAL at 17:31

## 2020-01-01 RX ADMIN — SODIUM BICARBONATE 650 MG TABLET 1300 MG: at 09:31

## 2020-01-01 RX ADMIN — GABAPENTIN 300 MG: 300 CAPSULE ORAL at 23:02

## 2020-01-01 RX ADMIN — PROCHLORPERAZINE EDISYLATE 10 MG: 5 INJECTION INTRAMUSCULAR; INTRAVENOUS at 11:58

## 2020-01-01 RX ADMIN — LINEZOLID 600 MG: 600 TABLET, FILM COATED ORAL at 16:17

## 2020-01-01 RX ADMIN — Medication 5 MG: at 20:18

## 2020-01-01 RX ADMIN — SEVELAMER CARBONATE 1600 MG: 800 TABLET, FILM COATED ORAL at 08:15

## 2020-01-01 RX ADMIN — ATROPINE SULFATE 2 DROP: 10 SOLUTION/ DROPS OPHTHALMIC at 23:47

## 2020-01-01 RX ADMIN — PANTOPRAZOLE SODIUM 40 MG: 40 INJECTION, POWDER, FOR SOLUTION INTRAVENOUS at 09:00

## 2020-01-01 RX ADMIN — RIFAXIMIN 550 MG: 550 TABLET ORAL at 10:03

## 2020-01-01 RX ADMIN — MIDODRINE HYDROCHLORIDE 10 MG: 5 TABLET ORAL at 18:07

## 2020-01-01 RX ADMIN — MICONAZOLE NITRATE: 20 POWDER TOPICAL at 08:10

## 2020-01-01 RX ADMIN — MAGNESIUM SULFATE 1 G: 1 INJECTION INTRAVENOUS at 05:00

## 2020-01-01 RX ADMIN — MIDODRINE HYDROCHLORIDE 20 MG: 5 TABLET ORAL at 08:06

## 2020-01-01 RX ADMIN — GABAPENTIN 300 MG: 300 CAPSULE ORAL at 21:17

## 2020-01-01 RX ADMIN — RIFAXIMIN 550 MG: 550 TABLET ORAL at 19:32

## 2020-01-01 RX ADMIN — PANTOPRAZOLE SODIUM 40 MG: 40 TABLET, DELAYED RELEASE ORAL at 09:09

## 2020-01-01 RX ADMIN — VANCOMYCIN HYDROCHLORIDE 125 MG: 125 CAPSULE ORAL at 16:43

## 2020-01-01 RX ADMIN — Medication 2.5 MG: at 04:13

## 2020-01-01 RX ADMIN — FLUCONAZOLE 200 MG: 200 INJECTION, SOLUTION INTRAVENOUS at 23:41

## 2020-01-01 RX ADMIN — SODIUM BICARBONATE 650 MG TABLET 1300 MG: at 19:32

## 2020-01-01 RX ADMIN — MIDODRINE HYDROCHLORIDE 20 MG: 5 TABLET ORAL at 13:42

## 2020-01-01 RX ADMIN — INSULIN ASPART 1 UNITS: 100 INJECTION, SOLUTION INTRAVENOUS; SUBCUTANEOUS at 11:32

## 2020-01-01 RX ADMIN — LEVOTHYROXINE SODIUM 75 MCG: 0.05 TABLET ORAL at 08:04

## 2020-01-01 RX ADMIN — LACTULOSE 20 G: 20 POWDER, FOR SOLUTION ORAL at 15:56

## 2020-01-01 RX ADMIN — LEVOTHYROXINE SODIUM 75 MCG: 0.05 TABLET ORAL at 10:03

## 2020-01-01 RX ADMIN — MICONAZOLE NITRATE: 20 POWDER TOPICAL at 19:27

## 2020-01-01 RX ADMIN — RIFAXIMIN 550 MG: 550 TABLET ORAL at 21:47

## 2020-01-01 RX ADMIN — PANTOPRAZOLE SODIUM 40 MG: 40 INJECTION, POWDER, FOR SOLUTION INTRAVENOUS at 20:45

## 2020-01-01 RX ADMIN — MELATONIN 50 MCG: at 08:41

## 2020-01-01 RX ADMIN — MIDODRINE HYDROCHLORIDE 10 MG: 5 TABLET ORAL at 17:58

## 2020-01-01 RX ADMIN — VANCOMYCIN HYDROCHLORIDE 125 MG: 125 CAPSULE ORAL at 21:02

## 2020-01-01 RX ADMIN — PROCHLORPERAZINE EDISYLATE 5 MG: 5 INJECTION INTRAMUSCULAR; INTRAVENOUS at 08:27

## 2020-01-01 RX ADMIN — CEFTRIAXONE 1 G: 1 INJECTION, POWDER, FOR SOLUTION INTRAMUSCULAR; INTRAVENOUS at 15:38

## 2020-01-01 RX ADMIN — LINEZOLID 600 MG: 600 INJECTION, SOLUTION INTRAVENOUS at 22:40

## 2020-01-01 RX ADMIN — ONDANSETRON 4 MG: 4 TABLET, ORALLY DISINTEGRATING ORAL at 03:54

## 2020-01-01 RX ADMIN — LIDOCAINE HYDROCHLORIDE 6 ML: 10 INJECTION, SOLUTION EPIDURAL; INFILTRATION; INTRACAUDAL; PERINEURAL at 10:14

## 2020-01-01 RX ADMIN — Medication 2.5 MG: at 04:12

## 2020-01-01 RX ADMIN — FUROSEMIDE 120 MG: 10 INJECTION, SOLUTION INTRAMUSCULAR; INTRAVENOUS at 18:40

## 2020-01-01 RX ADMIN — INSULIN ASPART 1 UNITS: 100 INJECTION, SOLUTION INTRAVENOUS; SUBCUTANEOUS at 13:00

## 2020-01-01 RX ADMIN — TAMSULOSIN HYDROCHLORIDE 0.4 MG: 0.4 CAPSULE ORAL at 08:23

## 2020-01-01 RX ADMIN — Medication 15 ML: at 09:56

## 2020-01-01 RX ADMIN — LINEZOLID 600 MG: 600 INJECTION, SOLUTION INTRAVENOUS at 06:02

## 2020-01-01 RX ADMIN — LEVOTHYROXINE SODIUM 75 MCG: 0.05 TABLET ORAL at 07:57

## 2020-01-01 RX ADMIN — SODIUM CHLORIDE, POTASSIUM CHLORIDE, SODIUM LACTATE AND CALCIUM CHLORIDE 500 ML: 600; 310; 30; 20 INJECTION, SOLUTION INTRAVENOUS at 10:36

## 2020-01-01 RX ADMIN — RIFAXIMIN 550 MG: 550 TABLET ORAL at 08:08

## 2020-01-01 RX ADMIN — VANCOMYCIN HYDROCHLORIDE 125 MG: 125 CAPSULE ORAL at 09:10

## 2020-01-01 RX ADMIN — LEVOTHYROXINE SODIUM 75 MCG: 0.05 TABLET ORAL at 09:17

## 2020-01-01 RX ADMIN — CALCIUM CARBONATE (ANTACID) CHEW TAB 500 MG 500 MG: 500 CHEW TAB at 18:05

## 2020-01-01 RX ADMIN — SODIUM BICARBONATE 650 MG TABLET 1300 MG: at 09:09

## 2020-01-01 RX ADMIN — LIDOCAINE 1 PATCH: 560 PATCH PERCUTANEOUS; TOPICAL; TRANSDERMAL at 09:42

## 2020-01-01 RX ADMIN — Medication 0.5 MG: at 07:47

## 2020-01-01 RX ADMIN — PANTOPRAZOLE SODIUM 40 MG: 40 INJECTION, POWDER, FOR SOLUTION INTRAVENOUS at 10:04

## 2020-01-01 RX ADMIN — MIDODRINE HYDROCHLORIDE 10 MG: 5 TABLET ORAL at 17:21

## 2020-01-01 RX ADMIN — Medication 40 MG: at 20:22

## 2020-01-01 RX ADMIN — RIFAXIMIN 550 MG: 550 TABLET ORAL at 09:43

## 2020-01-01 RX ADMIN — LACTULOSE 20 G: 20 POWDER, FOR SOLUTION ORAL at 09:42

## 2020-01-01 RX ADMIN — SODIUM CHLORIDE, POTASSIUM CHLORIDE, SODIUM LACTATE AND CALCIUM CHLORIDE 500 ML: 600; 310; 30; 20 INJECTION, SOLUTION INTRAVENOUS at 12:38

## 2020-01-01 RX ADMIN — MICONAZOLE NITRATE: 20 POWDER TOPICAL at 10:05

## 2020-01-01 RX ADMIN — SODIUM BICARBONATE 650 MG TABLET 1300 MG: at 09:00

## 2020-01-01 RX ADMIN — RIFAXIMIN 550 MG: 550 TABLET ORAL at 19:31

## 2020-01-01 RX ADMIN — RIFAXIMIN 550 MG: 550 TABLET ORAL at 19:46

## 2020-01-01 RX ADMIN — RIFAXIMIN 550 MG: 550 TABLET ORAL at 09:19

## 2020-01-01 RX ADMIN — MIDODRINE HYDROCHLORIDE 10 MG: 5 TABLET ORAL at 17:44

## 2020-01-01 RX ADMIN — Medication 2.5 MG: at 12:33

## 2020-01-01 RX ADMIN — PRAMIPEXOLE DIHYDROCHLORIDE 0.12 MG: 0.12 TABLET ORAL at 21:51

## 2020-01-01 RX ADMIN — OXYBUTYNIN CHLORIDE 15 MG: 10 TABLET, EXTENDED RELEASE ORAL at 08:15

## 2020-01-01 RX ADMIN — LACTULOSE 100 G: 10 SOLUTION ORAL; RECTAL at 10:30

## 2020-01-01 RX ADMIN — ONDANSETRON 4 MG: 4 TABLET, ORALLY DISINTEGRATING ORAL at 19:22

## 2020-01-01 RX ADMIN — OXYBUTYNIN CHLORIDE 15 MG: 10 TABLET, EXTENDED RELEASE ORAL at 09:42

## 2020-01-01 RX ADMIN — PANTOPRAZOLE SODIUM 40 MG: 40 INJECTION, POWDER, FOR SOLUTION INTRAVENOUS at 08:07

## 2020-01-01 RX ADMIN — LIDOCAINE 1 PATCH: 560 PATCH PERCUTANEOUS; TOPICAL; TRANSDERMAL at 09:17

## 2020-01-01 RX ADMIN — MICONAZOLE NITRATE: 20 POWDER TOPICAL at 23:10

## 2020-01-01 RX ADMIN — PANTOPRAZOLE SODIUM 40 MG: 40 TABLET, DELAYED RELEASE ORAL at 09:43

## 2020-01-01 RX ADMIN — Medication 2.5 MG: at 08:01

## 2020-01-01 RX ADMIN — SEVELAMER CARBONATE 1600 MG: 800 TABLET, FILM COATED ORAL at 09:47

## 2020-01-01 RX ADMIN — INSULIN ASPART 1 UNITS: 100 INJECTION, SOLUTION INTRAVENOUS; SUBCUTANEOUS at 11:58

## 2020-01-01 RX ADMIN — MIDODRINE HYDROCHLORIDE 10 MG: 5 TABLET ORAL at 08:10

## 2020-01-01 RX ADMIN — Medication 40 MG: at 11:51

## 2020-01-01 RX ADMIN — SODIUM BICARBONATE 650 MG TABLET 1300 MG: at 10:04

## 2020-01-01 RX ADMIN — MIDODRINE HYDROCHLORIDE 10 MG: 5 TABLET ORAL at 11:47

## 2020-01-01 RX ADMIN — DEXTROSE MONOHYDRATE: 100 INJECTION, SOLUTION INTRAVENOUS at 06:05

## 2020-01-01 RX ADMIN — OXYCODONE HYDROCHLORIDE 5 MG: 5 TABLET ORAL at 15:06

## 2020-01-01 RX ADMIN — VANCOMYCIN HYDROCHLORIDE 125 MG: 125 CAPSULE ORAL at 16:01

## 2020-01-01 RX ADMIN — MIDODRINE HYDROCHLORIDE 10 MG: 5 TABLET ORAL at 18:08

## 2020-01-01 RX ADMIN — SEVELAMER CARBONATE 1600 MG: 800 TABLET, FILM COATED ORAL at 13:13

## 2020-01-01 RX ADMIN — LACTULOSE 20 G: 20 SOLUTION ORAL at 12:57

## 2020-01-01 RX ADMIN — SEVELAMER CARBONATE 1600 MG: 800 TABLET, FILM COATED ORAL at 14:40

## 2020-01-01 RX ADMIN — GABAPENTIN 300 MG: 300 CAPSULE ORAL at 21:52

## 2020-01-01 RX ADMIN — MIDODRINE HYDROCHLORIDE 10 MG: 5 TABLET ORAL at 18:18

## 2020-01-01 RX ADMIN — INSULIN GLARGINE 7 UNITS: 100 INJECTION, SOLUTION SUBCUTANEOUS at 21:48

## 2020-01-01 RX ADMIN — SEVELAMER CARBONATE 1600 MG: 800 TABLET, FILM COATED ORAL at 09:41

## 2020-01-01 RX ADMIN — PANTOPRAZOLE SODIUM 40 MG: 40 TABLET, DELAYED RELEASE ORAL at 15:58

## 2020-01-01 RX ADMIN — LACTULOSE 20 G: 20 POWDER, FOR SOLUTION ORAL at 08:04

## 2020-01-01 RX ADMIN — ONDANSETRON 4 MG: 2 INJECTION INTRAMUSCULAR; INTRAVENOUS at 12:12

## 2020-01-01 RX ADMIN — VANCOMYCIN HYDROCHLORIDE 125 MG: 125 CAPSULE ORAL at 20:18

## 2020-01-01 RX ADMIN — RIFAXIMIN 550 MG: 550 TABLET ORAL at 07:58

## 2020-01-01 RX ADMIN — HYDROXYZINE HYDROCHLORIDE 10 MG: 10 TABLET ORAL at 23:12

## 2020-01-01 RX ADMIN — TAMSULOSIN HYDROCHLORIDE 0.4 MG: 0.4 CAPSULE ORAL at 08:10

## 2020-01-01 RX ADMIN — OXYBUTYNIN CHLORIDE 15 MG: 10 TABLET, EXTENDED RELEASE ORAL at 07:55

## 2020-01-01 RX ADMIN — LIDOCAINE 1 PATCH: 560 PATCH PERCUTANEOUS; TOPICAL; TRANSDERMAL at 08:33

## 2020-01-01 RX ADMIN — MICONAZOLE NITRATE: 20 POWDER TOPICAL at 19:39

## 2020-01-01 RX ADMIN — LINEZOLID 600 MG: 600 TABLET, FILM COATED ORAL at 19:48

## 2020-01-01 RX ADMIN — VANCOMYCIN HYDROCHLORIDE 125 MG: 125 CAPSULE ORAL at 13:43

## 2020-01-01 RX ADMIN — LACTULOSE 20 G: 20 POWDER, FOR SOLUTION ORAL at 19:39

## 2020-01-01 RX ADMIN — PREDNISONE 5 MG: 5 TABLET ORAL at 16:09

## 2020-01-01 RX ADMIN — ONDANSETRON 4 MG: 2 INJECTION INTRAMUSCULAR; INTRAVENOUS at 05:36

## 2020-01-01 RX ADMIN — Medication 15 ML: at 11:20

## 2020-01-01 RX ADMIN — LINEZOLID 600 MG: 600 TABLET, FILM COATED ORAL at 21:53

## 2020-01-01 RX ADMIN — MICONAZOLE NITRATE: 20 POWDER TOPICAL at 20:28

## 2020-01-01 RX ADMIN — ALUMINUM HYDROXIDE, MAGNESIUM HYDROXIDE, AND DIMETHICONE 30 ML: 400; 400; 40 SUSPENSION ORAL at 18:14

## 2020-01-01 RX ADMIN — LINEZOLID 600 MG: 600 INJECTION, SOLUTION INTRAVENOUS at 18:56

## 2020-01-01 RX ADMIN — PREDNISONE 5 MG: 5 TABLET ORAL at 17:10

## 2020-01-01 RX ADMIN — OXYCODONE HYDROCHLORIDE 5 MG: 5 SOLUTION ORAL at 06:11

## 2020-01-01 RX ADMIN — LEVOTHYROXINE SODIUM 75 MCG: 0.05 TABLET ORAL at 08:23

## 2020-01-01 RX ADMIN — MIDODRINE HYDROCHLORIDE 15 MG: 5 TABLET ORAL at 12:47

## 2020-01-01 RX ADMIN — MIDODRINE HYDROCHLORIDE 10 MG: 5 TABLET ORAL at 09:19

## 2020-01-01 RX ADMIN — MICONAZOLE NITRATE: 20 POWDER TOPICAL at 08:11

## 2020-01-01 RX ADMIN — LIDOCAINE 1 PATCH: 560 PATCH PERCUTANEOUS; TOPICAL; TRANSDERMAL at 07:50

## 2020-01-01 RX ADMIN — SODIUM BICARBONATE 650 MG TABLET 1300 MG: at 19:42

## 2020-01-01 RX ADMIN — VANCOMYCIN HYDROCHLORIDE 125 MG: 125 CAPSULE ORAL at 19:42

## 2020-01-01 RX ADMIN — MICONAZOLE NITRATE: 20 POWDER TOPICAL at 20:27

## 2020-01-01 RX ADMIN — VANCOMYCIN HYDROCHLORIDE 125 MG: 125 CAPSULE ORAL at 17:03

## 2020-01-01 RX ADMIN — PROCHLORPERAZINE EDISYLATE 5 MG: 5 INJECTION INTRAMUSCULAR; INTRAVENOUS at 08:55

## 2020-01-01 RX ADMIN — MELATONIN 50 MCG: at 08:16

## 2020-01-01 RX ADMIN — MIDODRINE HYDROCHLORIDE 20 MG: 5 TABLET ORAL at 17:38

## 2020-01-01 RX ADMIN — Medication 5 MG: at 21:52

## 2020-01-01 RX ADMIN — GABAPENTIN 300 MG: 300 CAPSULE ORAL at 22:07

## 2020-01-01 RX ADMIN — PROCHLORPERAZINE EDISYLATE 5 MG: 5 INJECTION INTRAMUSCULAR; INTRAVENOUS at 20:46

## 2020-01-01 RX ADMIN — DEXTROSE MONOHYDRATE: 100 INJECTION, SOLUTION INTRAVENOUS at 12:41

## 2020-01-01 RX ADMIN — LIDOCAINE 1 PATCH: 560 PATCH PERCUTANEOUS; TOPICAL; TRANSDERMAL at 10:04

## 2020-01-01 RX ADMIN — SEVELAMER CARBONATE 1600 MG: 800 TABLET, FILM COATED ORAL at 11:40

## 2020-01-01 RX ADMIN — VANCOMYCIN HYDROCHLORIDE 500 MG: 500 INJECTION, POWDER, LYOPHILIZED, FOR SOLUTION INTRAVENOUS at 21:00

## 2020-01-01 RX ADMIN — VANCOMYCIN HYDROCHLORIDE 125 MG: 125 CAPSULE ORAL at 15:19

## 2020-01-01 RX ADMIN — TAMSULOSIN HYDROCHLORIDE 0.4 MG: 0.4 CAPSULE ORAL at 08:04

## 2020-01-01 RX ADMIN — LIDOCAINE 1 PATCH: 560 PATCH PERCUTANEOUS; TOPICAL; TRANSDERMAL at 08:18

## 2020-01-01 RX ADMIN — LACTULOSE 20 G: 20 SOLUTION ORAL at 18:10

## 2020-01-01 RX ADMIN — SODIUM CHLORIDE, POTASSIUM CHLORIDE, SODIUM LACTATE AND CALCIUM CHLORIDE 500 ML: 600; 310; 30; 20 INJECTION, SOLUTION INTRAVENOUS at 18:26

## 2020-01-01 RX ADMIN — Medication 0.5 MG: at 09:56

## 2020-01-01 RX ADMIN — VANCOMYCIN HYDROCHLORIDE 125 MG: 125 CAPSULE ORAL at 11:58

## 2020-01-01 RX ADMIN — Medication 40 MG: at 21:13

## 2020-01-01 RX ADMIN — SODIUM BICARBONATE 650 MG TABLET 1300 MG: at 08:04

## 2020-01-01 RX ADMIN — INSULIN ASPART 1 UNITS: 100 INJECTION, SOLUTION INTRAVENOUS; SUBCUTANEOUS at 08:01

## 2020-01-01 RX ADMIN — SODIUM BICARBONATE 650 MG TABLET 1300 MG: at 19:39

## 2020-01-01 RX ADMIN — MIDODRINE HYDROCHLORIDE 10 MG: 5 TABLET ORAL at 12:18

## 2020-01-01 RX ADMIN — MIDODRINE HYDROCHLORIDE 10 MG: 5 TABLET ORAL at 11:36

## 2020-01-01 RX ADMIN — RIFAXIMIN 550 MG: 550 TABLET ORAL at 12:00

## 2020-01-01 RX ADMIN — MELATONIN 50 MCG: at 10:04

## 2020-01-01 RX ADMIN — Medication 0.5 MG: at 08:39

## 2020-01-01 RX ADMIN — LACTULOSE 20 G: 20 SOLUTION ORAL at 01:51

## 2020-01-01 RX ADMIN — SODIUM BICARBONATE 650 MG TABLET 1300 MG: at 23:57

## 2020-01-01 RX ADMIN — MICONAZOLE NITRATE: 20 POWDER TOPICAL at 19:47

## 2020-01-01 RX ADMIN — VANCOMYCIN HYDROCHLORIDE 125 MG: 125 CAPSULE ORAL at 16:55

## 2020-01-01 RX ADMIN — RIFAXIMIN 550 MG: 550 TABLET ORAL at 20:15

## 2020-01-01 RX ADMIN — VANCOMYCIN HYDROCHLORIDE 125 MG: 125 CAPSULE ORAL at 16:34

## 2020-01-01 RX ADMIN — MICONAZOLE NITRATE: 20 POWDER TOPICAL at 08:15

## 2020-01-01 RX ADMIN — SEVELAMER CARBONATE 1600 MG: 800 TABLET, FILM COATED ORAL at 17:21

## 2020-01-01 RX ADMIN — CEFTRIAXONE SODIUM 2 G: 2 INJECTION, POWDER, FOR SOLUTION INTRAMUSCULAR; INTRAVENOUS at 19:00

## 2020-01-01 RX ADMIN — VANCOMYCIN HYDROCHLORIDE 125 MG: 125 CAPSULE ORAL at 08:10

## 2020-01-01 RX ADMIN — Medication 0.2 MG: at 06:43

## 2020-01-01 RX ADMIN — OXYBUTYNIN CHLORIDE 15 MG: 10 TABLET, EXTENDED RELEASE ORAL at 08:58

## 2020-01-01 RX ADMIN — SEVELAMER CARBONATE 1600 MG: 800 TABLET, FILM COATED ORAL at 13:40

## 2020-01-01 RX ADMIN — LIDOCAINE 1 PATCH: 560 PATCH PERCUTANEOUS; TOPICAL; TRANSDERMAL at 09:01

## 2020-01-01 RX ADMIN — PROCHLORPERAZINE EDISYLATE 5 MG: 5 INJECTION INTRAMUSCULAR; INTRAVENOUS at 14:38

## 2020-01-01 RX ADMIN — MAGNESIUM SULFATE IN WATER 2 G: 40 INJECTION, SOLUTION INTRAVENOUS at 01:49

## 2020-01-01 RX ADMIN — LIDOCAINE 1 PATCH: 560 PATCH PERCUTANEOUS; TOPICAL; TRANSDERMAL at 08:25

## 2020-01-01 RX ADMIN — MELATONIN 50 MCG: at 08:09

## 2020-01-01 RX ADMIN — TAMSULOSIN HYDROCHLORIDE 0.4 MG: 0.4 CAPSULE ORAL at 10:03

## 2020-01-01 RX ADMIN — IPRATROPIUM BROMIDE 2 SPRAY: 42 SPRAY NASAL at 08:02

## 2020-01-01 RX ADMIN — Medication 2.5 MG: at 06:58

## 2020-01-01 RX ADMIN — MIDODRINE HYDROCHLORIDE 20 MG: 5 TABLET ORAL at 11:18

## 2020-01-01 RX ADMIN — MICONAZOLE NITRATE: 20 POWDER TOPICAL at 07:59

## 2020-01-01 RX ADMIN — PANTOPRAZOLE SODIUM 40 MG: 40 INJECTION, POWDER, FOR SOLUTION INTRAVENOUS at 19:37

## 2020-01-01 RX ADMIN — VANCOMYCIN HYDROCHLORIDE 125 MG: 125 CAPSULE ORAL at 11:16

## 2020-01-01 RX ADMIN — LORAZEPAM 0.5 MG: 2 INJECTION INTRAMUSCULAR; INTRAVENOUS at 11:28

## 2020-01-01 RX ADMIN — ONDANSETRON 4 MG: 2 INJECTION INTRAMUSCULAR; INTRAVENOUS at 15:21

## 2020-01-01 RX ADMIN — IPRATROPIUM BROMIDE 2 SPRAY: 42 SPRAY NASAL at 07:57

## 2020-01-01 RX ADMIN — SODIUM CHLORIDE 1000 ML: 9 INJECTION, SOLUTION INTRAVENOUS at 16:59

## 2020-01-01 RX ADMIN — OXYCODONE HYDROCHLORIDE 2.5 MG: 5 SOLUTION ORAL at 09:35

## 2020-01-01 RX ADMIN — PROCHLORPERAZINE EDISYLATE 5 MG: 5 INJECTION INTRAMUSCULAR; INTRAVENOUS at 02:45

## 2020-01-01 RX ADMIN — PANTOPRAZOLE SODIUM 40 MG: 40 INJECTION, POWDER, FOR SOLUTION INTRAVENOUS at 10:20

## 2020-01-01 RX ADMIN — Medication 2.5 MG: at 02:15

## 2020-01-01 RX ADMIN — Medication 0.2 MG: at 03:51

## 2020-01-01 RX ADMIN — ONDANSETRON 4 MG: 4 TABLET, ORALLY DISINTEGRATING ORAL at 17:49

## 2020-01-01 RX ADMIN — OXYBUTYNIN CHLORIDE 15 MG: 10 TABLET, EXTENDED RELEASE ORAL at 10:03

## 2020-01-01 RX ADMIN — OXYBUTYNIN CHLORIDE 15 MG: 10 TABLET, EXTENDED RELEASE ORAL at 07:58

## 2020-01-01 RX ADMIN — MICONAZOLE NITRATE: 20 POWDER TOPICAL at 21:30

## 2020-01-01 RX ADMIN — PRAMIPEXOLE DIHYDROCHLORIDE 0.12 MG: 0.12 TABLET ORAL at 22:07

## 2020-01-01 RX ADMIN — SODIUM CHLORIDE 500 ML: 9 INJECTION, SOLUTION INTRAVENOUS at 16:54

## 2020-01-01 RX ADMIN — HYDROXYZINE HYDROCHLORIDE 10 MG: 10 TABLET ORAL at 21:36

## 2020-01-01 RX ADMIN — MICONAZOLE NITRATE: 20 POWDER TOPICAL at 22:40

## 2020-01-01 RX ADMIN — Medication 2.5 MG: at 14:20

## 2020-01-01 RX ADMIN — Medication 0.2 MG: at 00:36

## 2020-01-01 RX ADMIN — LIDOCAINE 1 PATCH: 560 PATCH PERCUTANEOUS; TOPICAL; TRANSDERMAL at 09:31

## 2020-01-01 RX ADMIN — PANTOPRAZOLE SODIUM 40 MG: 40 INJECTION, POWDER, FOR SOLUTION INTRAVENOUS at 19:46

## 2020-01-01 RX ADMIN — VANCOMYCIN HYDROCHLORIDE 125 MG: 125 CAPSULE ORAL at 16:21

## 2020-01-01 RX ADMIN — MICONAZOLE NITRATE: 20 POWDER TOPICAL at 19:48

## 2020-01-01 RX ADMIN — ALBUMIN HUMAN 25 G: 0.25 SOLUTION INTRAVENOUS at 14:39

## 2020-01-01 RX ADMIN — SODIUM BICARBONATE 650 MG TABLET 1300 MG: at 20:17

## 2020-01-01 RX ADMIN — PANTOPRAZOLE SODIUM 40 MG: 40 TABLET, DELAYED RELEASE ORAL at 16:09

## 2020-01-01 RX ADMIN — SODIUM BICARBONATE 650 MG TABLET 1300 MG: at 08:21

## 2020-01-01 RX ADMIN — INSULIN ASPART 1 UNITS: 100 INJECTION, SOLUTION INTRAVENOUS; SUBCUTANEOUS at 10:41

## 2020-01-01 RX ADMIN — VANCOMYCIN HYDROCHLORIDE 125 MG: 125 CAPSULE ORAL at 20:28

## 2020-01-01 RX ADMIN — MIDODRINE HYDROCHLORIDE 10 MG: 5 TABLET ORAL at 13:14

## 2020-01-01 RX ADMIN — LIDOCAINE HYDROCHLORIDE 5 ML: 10 INJECTION, SOLUTION EPIDURAL; INFILTRATION; INTRACAUDAL; PERINEURAL at 14:20

## 2020-01-01 RX ADMIN — SODIUM BICARBONATE 650 MG TABLET 1300 MG: at 07:58

## 2020-01-01 RX ADMIN — PROCHLORPERAZINE EDISYLATE 5 MG: 5 INJECTION INTRAMUSCULAR; INTRAVENOUS at 14:10

## 2020-01-01 RX ADMIN — ALBUMIN HUMAN 50 G: 0.25 SOLUTION INTRAVENOUS at 15:56

## 2020-01-01 RX ADMIN — OCTREOTIDE ACETATE 50 MCG: 50 INJECTION, SOLUTION INTRAVENOUS; SUBCUTANEOUS at 23:45

## 2020-01-01 RX ADMIN — LIDOCAINE HYDROCHLORIDE 10 ML: 10 INJECTION, SOLUTION EPIDURAL; INFILTRATION; INTRACAUDAL; PERINEURAL at 15:13

## 2020-01-01 RX ADMIN — SODIUM BICARBONATE 650 MG TABLET 1300 MG: at 09:18

## 2020-01-01 RX ADMIN — GABAPENTIN 300 MG: 300 CAPSULE ORAL at 21:36

## 2020-01-01 RX ADMIN — FUROSEMIDE 120 MG: 10 INJECTION, SOLUTION INTRAVENOUS at 17:31

## 2020-01-01 RX ADMIN — RIFAXIMIN 550 MG: 550 TABLET ORAL at 19:39

## 2020-01-01 RX ADMIN — Medication 0.5 MG: at 23:55

## 2020-01-01 RX ADMIN — LACTULOSE 20 G: 20 POWDER, FOR SOLUTION ORAL at 21:46

## 2020-01-01 RX ADMIN — ONDANSETRON 4 MG: 2 INJECTION INTRAMUSCULAR; INTRAVENOUS at 00:20

## 2020-01-01 RX ADMIN — LORAZEPAM 0.5 MG: 2 INJECTION INTRAMUSCULAR; INTRAVENOUS at 14:02

## 2020-01-01 RX ADMIN — Medication 2.5 MG: at 05:35

## 2020-01-01 RX ADMIN — LIDOCAINE 1 PATCH: 560 PATCH PERCUTANEOUS; TOPICAL; TRANSDERMAL at 08:55

## 2020-01-01 RX ADMIN — VANCOMYCIN HYDROCHLORIDE 125 MG: 125 CAPSULE ORAL at 20:27

## 2020-01-01 RX ADMIN — SODIUM BICARBONATE 650 MG TABLET 1300 MG: at 21:48

## 2020-01-01 RX ADMIN — SODIUM BICARBONATE 650 MG TABLET 1300 MG: at 19:46

## 2020-01-01 RX ADMIN — LEVOTHYROXINE SODIUM 75 MCG: 0.05 TABLET ORAL at 09:08

## 2020-01-01 RX ADMIN — IPRATROPIUM BROMIDE 2 SPRAY: 42 SPRAY NASAL at 08:27

## 2020-01-01 RX ADMIN — VANCOMYCIN HYDROCHLORIDE 125 MG: 125 CAPSULE ORAL at 17:25

## 2020-01-01 RX ADMIN — RIFAXIMIN 550 MG: 550 TABLET ORAL at 19:42

## 2020-01-01 RX ADMIN — Medication 0.5 MG: at 05:02

## 2020-01-01 RX ADMIN — SODIUM BICARBONATE 650 MG TABLET 1300 MG: at 08:26

## 2020-01-01 RX ADMIN — MICONAZOLE NITRATE: 20 POWDER TOPICAL at 10:46

## 2020-01-01 RX ADMIN — PANTOPRAZOLE SODIUM 40 MG: 40 TABLET, DELAYED RELEASE ORAL at 08:08

## 2020-01-01 RX ADMIN — PRAMIPEXOLE DIHYDROCHLORIDE 0.12 MG: 0.12 TABLET ORAL at 21:48

## 2020-01-01 RX ADMIN — LACTULOSE 20 G: 20 POWDER, FOR SOLUTION ORAL at 14:40

## 2020-01-01 RX ADMIN — PRAMIPEXOLE DIHYDROCHLORIDE 0.12 MG: 0.12 TABLET ORAL at 21:17

## 2020-01-01 RX ADMIN — Medication 40 MG: at 05:01

## 2020-01-01 RX ADMIN — SEVELAMER CARBONATE 1600 MG: 800 TABLET, FILM COATED ORAL at 12:45

## 2020-01-01 RX ADMIN — VANCOMYCIN HYDROCHLORIDE 125 MG: 125 CAPSULE ORAL at 16:36

## 2020-01-01 RX ADMIN — Medication 2.5 MG: at 11:36

## 2020-01-01 RX ADMIN — PRAMIPEXOLE DIHYDROCHLORIDE 0.12 MG: 0.12 TABLET ORAL at 21:38

## 2020-01-01 RX ADMIN — RIFAXIMIN 550 MG: 550 TABLET ORAL at 10:49

## 2020-01-01 RX ADMIN — SEVELAMER CARBONATE 1600 MG: 800 TABLET, FILM COATED ORAL at 11:45

## 2020-01-01 RX ADMIN — GABAPENTIN 300 MG: 300 CAPSULE ORAL at 21:48

## 2020-01-01 RX ADMIN — Medication 0.2 MG: at 01:01

## 2020-01-01 RX ADMIN — HYDROXYZINE HYDROCHLORIDE 10 MG: 10 TABLET ORAL at 19:50

## 2020-01-01 RX ADMIN — CALCIUM CARBONATE (ANTACID) CHEW TAB 500 MG 500 MG: 500 CHEW TAB at 12:18

## 2020-01-01 RX ADMIN — PANTOPRAZOLE SODIUM 40 MG: 40 INJECTION, POWDER, FOR SOLUTION INTRAVENOUS at 21:41

## 2020-01-01 RX ADMIN — INSULIN ASPART 1 UNITS: 100 INJECTION, SOLUTION INTRAVENOUS; SUBCUTANEOUS at 13:04

## 2020-01-01 RX ADMIN — ONDANSETRON 4 MG: 2 INJECTION INTRAMUSCULAR; INTRAVENOUS at 02:01

## 2020-01-01 RX ADMIN — MICONAZOLE NITRATE: 20 POWDER TOPICAL at 19:33

## 2020-01-01 RX ADMIN — SODIUM CHLORIDE, POTASSIUM CHLORIDE, SODIUM LACTATE AND CALCIUM CHLORIDE 1000 ML: 600; 310; 30; 20 INJECTION, SOLUTION INTRAVENOUS at 15:14

## 2020-01-01 RX ADMIN — Medication 2.5 MG: at 17:25

## 2020-01-01 RX ADMIN — MIDODRINE HYDROCHLORIDE 15 MG: 5 TABLET ORAL at 18:41

## 2020-01-01 RX ADMIN — ATROPINE SULFATE 2 DROP: 10 SOLUTION/ DROPS OPHTHALMIC at 01:12

## 2020-01-01 RX ADMIN — INSULIN ASPART 2 UNITS: 100 INJECTION, SOLUTION INTRAVENOUS; SUBCUTANEOUS at 08:25

## 2020-01-01 RX ADMIN — Medication 40 MG: at 16:09

## 2020-01-01 RX ADMIN — TAMSULOSIN HYDROCHLORIDE 0.4 MG: 0.4 CAPSULE ORAL at 08:16

## 2020-01-01 RX ADMIN — Medication 0.2 MG: at 11:48

## 2020-01-01 RX ADMIN — Medication 2.5 MG: at 03:02

## 2020-01-01 RX ADMIN — PANTOPRAZOLE SODIUM 40 MG: 40 INJECTION, POWDER, FOR SOLUTION INTRAVENOUS at 16:18

## 2020-01-01 RX ADMIN — PANTOPRAZOLE SODIUM 40 MG: 40 TABLET, DELAYED RELEASE ORAL at 08:41

## 2020-01-01 RX ADMIN — INSULIN ASPART 1 UNITS: 100 INJECTION, SOLUTION INTRAVENOUS; SUBCUTANEOUS at 17:09

## 2020-01-01 RX ADMIN — ONDANSETRON 4 MG: 2 INJECTION INTRAMUSCULAR; INTRAVENOUS at 00:18

## 2020-01-01 RX ADMIN — BUMETANIDE 2 MG: 2 TABLET ORAL at 16:33

## 2020-01-01 RX ADMIN — OXYCODONE HYDROCHLORIDE 5 MG: 5 TABLET ORAL at 21:08

## 2020-01-01 RX ADMIN — INSULIN ASPART 3 UNITS: 100 INJECTION, SOLUTION INTRAVENOUS; SUBCUTANEOUS at 17:02

## 2020-01-01 RX ADMIN — ATROPINE SULFATE 2 DROP: 10 SOLUTION/ DROPS OPHTHALMIC at 05:03

## 2020-01-01 RX ADMIN — LACTULOSE 20 G: 20 POWDER, FOR SOLUTION ORAL at 08:09

## 2020-01-01 RX ADMIN — Medication 0.5 MG: at 21:00

## 2020-01-01 RX ADMIN — ACETAMINOPHEN 500 MG: 500 TABLET, FILM COATED ORAL at 10:55

## 2020-01-01 RX ADMIN — MICONAZOLE NITRATE: 20 POWDER TOPICAL at 10:03

## 2020-01-01 RX ADMIN — SEVELAMER CARBONATE 1600 MG: 800 TABLET, FILM COATED ORAL at 07:58

## 2020-01-01 RX ADMIN — PANTOPRAZOLE SODIUM 40 MG: 40 TABLET, DELAYED RELEASE ORAL at 17:10

## 2020-01-01 RX ADMIN — ONDANSETRON 4 MG: 4 TABLET, ORALLY DISINTEGRATING ORAL at 20:18

## 2020-01-01 RX ADMIN — MELATONIN 50 MCG: at 07:59

## 2020-01-01 RX ADMIN — MIDODRINE HYDROCHLORIDE 20 MG: 5 TABLET ORAL at 18:28

## 2020-01-01 RX ADMIN — SODIUM BICARBONATE 650 MG TABLET 1300 MG: at 21:58

## 2020-01-01 RX ADMIN — Medication 2.5 MG: at 18:08

## 2020-01-01 RX ADMIN — MICONAZOLE NITRATE: 20 POWDER TOPICAL at 21:38

## 2020-01-01 RX ADMIN — PREDNISONE 5 MG: 5 TABLET ORAL at 17:35

## 2020-01-01 RX ADMIN — LINEZOLID 600 MG: 600 INJECTION, SOLUTION INTRAVENOUS at 19:31

## 2020-01-01 RX ADMIN — PANTOPRAZOLE SODIUM 40 MG: 40 INJECTION, POWDER, FOR SOLUTION INTRAVENOUS at 19:39

## 2020-01-01 RX ADMIN — MIDODRINE HYDROCHLORIDE 10 MG: 5 TABLET ORAL at 10:03

## 2020-01-01 RX ADMIN — LINEZOLID 600 MG: 600 INJECTION, SOLUTION INTRAVENOUS at 11:58

## 2020-01-01 RX ADMIN — ATROPINE SULFATE 2 DROP: 10 SOLUTION/ DROPS OPHTHALMIC at 04:15

## 2020-01-01 RX ADMIN — IPRATROPIUM BROMIDE 2 SPRAY: 42 SPRAY NASAL at 10:02

## 2020-01-01 RX ADMIN — CAPSAICIN: 0.75 CREAM TOPICAL at 00:43

## 2020-01-01 RX ADMIN — VANCOMYCIN HYDROCHLORIDE 125 MG: 125 CAPSULE ORAL at 19:31

## 2020-01-01 RX ADMIN — MICONAZOLE NITRATE: 20 POWDER TOPICAL at 09:27

## 2020-01-01 RX ADMIN — CEFTRIAXONE 1 G: 1 INJECTION, POWDER, FOR SOLUTION INTRAMUSCULAR; INTRAVENOUS at 15:58

## 2020-01-01 RX ADMIN — SODIUM BICARBONATE 650 MG TABLET 1300 MG: at 08:10

## 2020-01-01 RX ADMIN — RIFAXIMIN 550 MG: 550 TABLET ORAL at 08:05

## 2020-01-01 RX ADMIN — SODIUM BICARBONATE 650 MG TABLET 1300 MG: at 08:15

## 2020-01-01 RX ADMIN — FUROSEMIDE 120 MG: 10 INJECTION, SOLUTION INTRAVENOUS at 14:36

## 2020-01-01 RX ADMIN — LINEZOLID 600 MG: 600 TABLET, FILM COATED ORAL at 08:09

## 2020-01-01 RX ADMIN — CEFTRIAXONE SODIUM 2 G: 2 INJECTION, POWDER, FOR SOLUTION INTRAMUSCULAR; INTRAVENOUS at 18:52

## 2020-01-01 RX ADMIN — MICONAZOLE NITRATE: 20 POWDER TOPICAL at 09:07

## 2020-01-01 RX ADMIN — Medication 2.5 MG: at 05:01

## 2020-01-01 RX ADMIN — HYDROXYZINE HYDROCHLORIDE 10 MG: 10 TABLET ORAL at 20:57

## 2020-01-01 RX ADMIN — ATROPINE SULFATE 2 DROP: 10 SOLUTION/ DROPS OPHTHALMIC at 07:17

## 2020-01-01 RX ADMIN — PRAMIPEXOLE DIHYDROCHLORIDE 0.12 MG: 0.12 TABLET ORAL at 21:53

## 2020-01-01 RX ADMIN — TAMSULOSIN HYDROCHLORIDE 0.4 MG: 0.4 CAPSULE ORAL at 09:19

## 2020-01-01 RX ADMIN — LIDOCAINE 1 PATCH: 560 PATCH PERCUTANEOUS; TOPICAL; TRANSDERMAL at 08:08

## 2020-01-01 RX ADMIN — LINEZOLID 600 MG: 600 INJECTION, SOLUTION INTRAVENOUS at 06:17

## 2020-01-01 RX ADMIN — PANTOPRAZOLE SODIUM 40 MG: 40 TABLET, DELAYED RELEASE ORAL at 07:58

## 2020-01-01 RX ADMIN — SODIUM BICARBONATE 650 MG TABLET 1300 MG: at 09:40

## 2020-01-01 RX ADMIN — HYDROCORTISONE SODIUM SUCCINATE 50 MG: 100 INJECTION, POWDER, FOR SOLUTION INTRAMUSCULAR; INTRAVENOUS at 05:01

## 2020-01-01 RX ADMIN — Medication 0.2 MG: at 01:37

## 2020-01-01 RX ADMIN — RIFAXIMIN 550 MG: 550 TABLET ORAL at 08:20

## 2020-01-01 RX ADMIN — RIFAXIMIN 550 MG: 550 TABLET ORAL at 08:10

## 2020-01-01 RX ADMIN — ONDANSETRON 4 MG: 2 INJECTION INTRAMUSCULAR; INTRAVENOUS at 21:57

## 2020-01-01 RX ADMIN — CEFTRIAXONE 1 G: 1 INJECTION, POWDER, FOR SOLUTION INTRAMUSCULAR; INTRAVENOUS at 18:01

## 2020-01-01 RX ADMIN — Medication 2.5 MG: at 12:37

## 2020-01-01 RX ADMIN — Medication 2.5 MG: at 21:47

## 2020-01-01 RX ADMIN — MICONAZOLE NITRATE: 20 POWDER TOPICAL at 09:32

## 2020-01-01 RX ADMIN — LIDOCAINE 1 PATCH: 560 PATCH PERCUTANEOUS; TOPICAL; TRANSDERMAL at 08:27

## 2020-01-01 RX ADMIN — Medication 0.5 MG: at 18:30

## 2020-01-01 RX ADMIN — MICONAZOLE NITRATE: 20 POWDER TOPICAL at 14:04

## 2020-01-01 RX ADMIN — VANCOMYCIN HYDROCHLORIDE 125 MG: 125 CAPSULE ORAL at 11:51

## 2020-01-01 RX ADMIN — CALCIUM CARBONATE (ANTACID) CHEW TAB 500 MG 500 MG: 500 CHEW TAB at 16:01

## 2020-01-01 RX ADMIN — RIFAXIMIN 550 MG: 550 TABLET ORAL at 21:59

## 2020-01-01 RX ADMIN — DEXTROSE MONOHYDRATE 25 ML: 500 INJECTION PARENTERAL at 16:11

## 2020-01-01 RX ADMIN — CYCLOBENZAPRINE HYDROCHLORIDE 10 MG: 5 TABLET, FILM COATED ORAL at 23:19

## 2020-01-01 RX ADMIN — VANCOMYCIN HYDROCHLORIDE 125 MG: 125 CAPSULE ORAL at 12:37

## 2020-01-01 RX ADMIN — LEVOTHYROXINE SODIUM 75 MCG: 0.05 TABLET ORAL at 09:41

## 2020-01-01 RX ADMIN — INSULIN ASPART 1 UNITS: 100 INJECTION, SOLUTION INTRAVENOUS; SUBCUTANEOUS at 09:26

## 2020-01-01 RX ADMIN — RIFAXIMIN 550 MG: 550 TABLET ORAL at 09:31

## 2020-01-01 RX ADMIN — INSULIN ASPART 2 UNITS: 100 INJECTION, SOLUTION INTRAVENOUS; SUBCUTANEOUS at 17:33

## 2020-01-01 RX ADMIN — LIDOCAINE 1 PATCH: 560 PATCH PERCUTANEOUS; TOPICAL; TRANSDERMAL at 07:58

## 2020-01-01 RX ADMIN — SODIUM BICARBONATE 650 MG TABLET 1300 MG: at 19:22

## 2020-01-01 RX ADMIN — IPRATROPIUM BROMIDE 2 SPRAY: 42 SPRAY NASAL at 08:11

## 2020-01-01 RX ADMIN — Medication 5 MG: at 21:09

## 2020-01-01 RX ADMIN — DEXTROSE MONOHYDRATE: 100 INJECTION, SOLUTION INTRAVENOUS at 18:02

## 2020-01-01 RX ADMIN — TAMSULOSIN HYDROCHLORIDE 0.4 MG: 0.4 CAPSULE ORAL at 08:09

## 2020-01-01 RX ADMIN — LACTULOSE 20 G: 20 POWDER, FOR SOLUTION ORAL at 21:59

## 2020-01-01 RX ADMIN — Medication 2.5 MG: at 02:09

## 2020-01-01 RX ADMIN — PREDNISONE 5 MG: 5 TABLET ORAL at 17:25

## 2020-01-01 RX ADMIN — ALBUMIN HUMAN 25 G: 0.25 SOLUTION INTRAVENOUS at 16:04

## 2020-01-01 RX ADMIN — MICONAZOLE NITRATE: 20 POWDER TOPICAL at 19:36

## 2020-01-01 RX ADMIN — PANTOPRAZOLE SODIUM 40 MG: 40 TABLET, DELAYED RELEASE ORAL at 17:44

## 2020-01-01 RX ADMIN — OXYBUTYNIN CHLORIDE 15 MG: 10 TABLET, EXTENDED RELEASE ORAL at 09:18

## 2020-01-01 RX ADMIN — Medication 0.2 MG: at 18:50

## 2020-01-01 RX ADMIN — RIFAXIMIN 550 MG: 550 TABLET ORAL at 08:16

## 2020-01-01 RX ADMIN — LINEZOLID 600 MG: 600 INJECTION, SOLUTION INTRAVENOUS at 18:01

## 2020-01-01 RX ADMIN — RIFAXIMIN 550 MG: 550 TABLET ORAL at 07:57

## 2020-01-01 RX ADMIN — MICONAZOLE NITRATE: 20 POWDER TOPICAL at 08:09

## 2020-01-01 RX ADMIN — OXYBUTYNIN CHLORIDE 15 MG: 10 TABLET, EXTENDED RELEASE ORAL at 08:10

## 2020-01-01 RX ADMIN — VANCOMYCIN HYDROCHLORIDE 125 MG: 125 CAPSULE ORAL at 08:27

## 2020-01-01 RX ADMIN — VANCOMYCIN HYDROCHLORIDE 125 MG: 125 CAPSULE ORAL at 12:47

## 2020-01-01 RX ADMIN — MICONAZOLE NITRATE: 20 POWDER TOPICAL at 07:54

## 2020-01-01 RX ADMIN — MICONAZOLE NITRATE: 20 POWDER TOPICAL at 21:03

## 2020-01-01 RX ADMIN — LEVOTHYROXINE SODIUM 75 MCG: 0.05 TABLET ORAL at 08:16

## 2020-01-01 RX ADMIN — INSULIN ASPART 2 UNITS: 100 INJECTION, SOLUTION INTRAVENOUS; SUBCUTANEOUS at 09:11

## 2020-01-01 RX ADMIN — SEVELAMER CARBONATE 1600 MG: 800 TABLET, FILM COATED ORAL at 18:13

## 2020-01-01 RX ADMIN — MIDAZOLAM 2 MG: 1 INJECTION INTRAMUSCULAR; INTRAVENOUS at 16:28

## 2020-01-01 RX ADMIN — OXYCODONE HYDROCHLORIDE 2.5 MG: 5 SOLUTION ORAL at 18:27

## 2020-01-01 RX ADMIN — SODIUM BICARBONATE 650 MG TABLET 1300 MG: at 19:53

## 2020-01-01 RX ADMIN — HYDROCORTISONE SODIUM SUCCINATE 50 MG: 100 INJECTION, POWDER, FOR SOLUTION INTRAMUSCULAR; INTRAVENOUS at 17:15

## 2020-01-01 RX ADMIN — VANCOMYCIN HYDROCHLORIDE 125 MG: 125 CAPSULE ORAL at 16:37

## 2020-01-01 RX ADMIN — MICONAZOLE NITRATE: 20 POWDER TOPICAL at 07:49

## 2020-01-01 RX ADMIN — ALBUMIN HUMAN 25 G: 0.25 SOLUTION INTRAVENOUS at 10:15

## 2020-01-01 RX ADMIN — PRAMIPEXOLE DIHYDROCHLORIDE 0.12 MG: 0.12 TABLET ORAL at 22:00

## 2020-01-01 RX ADMIN — ATROPINE SULFATE 2 DROP: 10 SOLUTION/ DROPS OPHTHALMIC at 05:52

## 2020-01-01 RX ADMIN — INSULIN ASPART 2 UNITS: 100 INJECTION, SOLUTION INTRAVENOUS; SUBCUTANEOUS at 13:14

## 2020-01-01 RX ADMIN — PRAMIPEXOLE DIHYDROCHLORIDE 0.12 MG: 0.12 TABLET ORAL at 21:13

## 2020-01-01 RX ADMIN — MICONAZOLE NITRATE: 20 POWDER TOPICAL at 08:32

## 2020-01-01 RX ADMIN — Medication 2.5 MG: at 19:28

## 2020-01-01 RX ADMIN — PANTOPRAZOLE SODIUM 40 MG: 40 INJECTION, POWDER, FOR SOLUTION INTRAVENOUS at 08:27

## 2020-01-01 RX ADMIN — VANCOMYCIN HYDROCHLORIDE 125 MG: 125 CAPSULE ORAL at 11:39

## 2020-01-01 RX ADMIN — Medication 2.5 MG: at 04:14

## 2020-01-01 RX ADMIN — PREDNISONE 5 MG: 5 TABLET ORAL at 17:03

## 2020-01-01 RX ADMIN — PANTOPRAZOLE SODIUM 40 MG: 40 TABLET, DELAYED RELEASE ORAL at 09:19

## 2020-01-01 RX ADMIN — RIFAXIMIN 550 MG: 550 TABLET ORAL at 20:00

## 2020-01-01 RX ADMIN — PRAMIPEXOLE DIHYDROCHLORIDE 0.12 MG: 0.12 TABLET ORAL at 21:36

## 2020-01-01 RX ADMIN — PANTOPRAZOLE SODIUM 40 MG: 40 INJECTION, POWDER, FOR SOLUTION INTRAVENOUS at 19:54

## 2020-01-01 RX ADMIN — Medication 40 MG: at 18:42

## 2020-01-01 RX ADMIN — SEVELAMER CARBONATE 1600 MG: 800 TABLET, FILM COATED ORAL at 08:10

## 2020-01-01 RX ADMIN — MICONAZOLE NITRATE: 20 POWDER TOPICAL at 22:23

## 2020-01-01 RX ADMIN — PREDNISONE 5 MG: 5 TABLET ORAL at 15:38

## 2020-01-01 RX ADMIN — SEVELAMER CARBONATE 1600 MG: 800 TABLET, FILM COATED ORAL at 20:00

## 2020-01-01 RX ADMIN — Medication 5 MG: at 23:19

## 2020-01-01 RX ADMIN — PANTOPRAZOLE SODIUM 40 MG: 40 INJECTION, POWDER, FOR SOLUTION INTRAVENOUS at 21:47

## 2020-01-01 RX ADMIN — MIDODRINE HYDROCHLORIDE 20 MG: 5 TABLET ORAL at 09:30

## 2020-01-01 RX ADMIN — LINEZOLID 600 MG: 600 INJECTION, SOLUTION INTRAVENOUS at 11:21

## 2020-01-01 RX ADMIN — PANTOPRAZOLE SODIUM 40 MG: 40 TABLET, DELAYED RELEASE ORAL at 08:16

## 2020-01-01 RX ADMIN — MICONAZOLE NITRATE: 20 POWDER TOPICAL at 19:22

## 2020-01-01 RX ADMIN — GABAPENTIN 300 MG: 300 CAPSULE ORAL at 21:46

## 2020-01-01 RX ADMIN — ALBUMIN HUMAN 25 G: 0.25 SOLUTION INTRAVENOUS at 10:37

## 2020-01-01 RX ADMIN — Medication 2.5 MG: at 18:33

## 2020-01-01 RX ADMIN — OCTREOTIDE ACETATE 50 MCG/HR: 200 INJECTION, SOLUTION INTRAVENOUS; SUBCUTANEOUS at 14:01

## 2020-01-01 RX ADMIN — VANCOMYCIN HYDROCHLORIDE 125 MG: 125 CAPSULE ORAL at 11:18

## 2020-01-01 RX ADMIN — LINEZOLID 600 MG: 600 INJECTION, SOLUTION INTRAVENOUS at 05:56

## 2020-01-01 RX ADMIN — OXYCODONE HYDROCHLORIDE 2.5 MG: 5 SOLUTION ORAL at 16:00

## 2020-01-01 RX ADMIN — Medication 0.5 MG: at 17:07

## 2020-01-01 RX ADMIN — LEVOTHYROXINE SODIUM 37.5 MCG: 20 INJECTION, SOLUTION INTRAVENOUS at 08:28

## 2020-01-01 RX ADMIN — SEVELAMER CARBONATE 1600 MG: 800 TABLET, FILM COATED ORAL at 18:18

## 2020-01-01 RX ADMIN — MICONAZOLE NITRATE: 20 POWDER TOPICAL at 19:46

## 2020-01-01 RX ADMIN — MIDODRINE HYDROCHLORIDE 20 MG: 5 TABLET ORAL at 18:13

## 2020-01-01 RX ADMIN — LACTULOSE 20 G: 20 POWDER, FOR SOLUTION ORAL at 08:23

## 2020-01-01 RX ADMIN — CEFTRIAXONE 1 G: 1 INJECTION, POWDER, FOR SOLUTION INTRAMUSCULAR; INTRAVENOUS at 17:11

## 2020-01-01 RX ADMIN — HYDROXYZINE HYDROCHLORIDE 10 MG: 10 TABLET ORAL at 14:54

## 2020-01-01 RX ADMIN — HYDROCORTISONE SODIUM SUCCINATE 25 MG: 100 INJECTION, POWDER, FOR SOLUTION INTRAMUSCULAR; INTRAVENOUS at 16:01

## 2020-01-01 RX ADMIN — PROCHLORPERAZINE EDISYLATE 5 MG: 5 INJECTION INTRAMUSCULAR; INTRAVENOUS at 08:12

## 2020-01-01 RX ADMIN — LACTULOSE 20 G: 20 POWDER, FOR SOLUTION ORAL at 07:59

## 2020-01-01 RX ADMIN — FENTANYL CITRATE 100 MCG: 50 INJECTION, SOLUTION INTRAMUSCULAR; INTRAVENOUS at 16:28

## 2020-01-01 RX ADMIN — ATROPINE SULFATE 2 DROP: 10 SOLUTION/ DROPS OPHTHALMIC at 08:54

## 2020-01-01 RX ADMIN — BUMETANIDE 2 MG: 0.25 INJECTION, SOLUTION INTRAMUSCULAR; INTRAVENOUS at 09:55

## 2020-01-01 RX ADMIN — OCTREOTIDE ACETATE 50 MCG/HR: 200 INJECTION, SOLUTION INTRAVENOUS; SUBCUTANEOUS at 15:50

## 2020-01-01 RX ADMIN — PROCHLORPERAZINE EDISYLATE 5 MG: 5 INJECTION INTRAMUSCULAR; INTRAVENOUS at 08:30

## 2020-01-01 RX ADMIN — CALCIUM CARBONATE (ANTACID) CHEW TAB 500 MG 500 MG: 500 CHEW TAB at 17:49

## 2020-01-01 RX ADMIN — RIFAXIMIN 550 MG: 550 TABLET ORAL at 09:09

## 2020-01-01 RX ADMIN — CALCIUM CARBONATE (ANTACID) CHEW TAB 500 MG 500 MG: 500 CHEW TAB at 02:18

## 2020-01-01 RX ADMIN — ATROPINE SULFATE 1 DROP: 10 SOLUTION/ DROPS OPHTHALMIC at 03:00

## 2020-01-01 RX ADMIN — SEVELAMER CARBONATE 1600 MG: 800 TABLET, FILM COATED ORAL at 18:07

## 2020-01-01 RX ADMIN — LIDOCAINE 1 PATCH: 560 PATCH PERCUTANEOUS; TOPICAL; TRANSDERMAL at 09:09

## 2020-01-01 RX ADMIN — VANCOMYCIN HYDROCHLORIDE 125 MG: 125 CAPSULE ORAL at 12:06

## 2020-01-01 RX ADMIN — SEVELAMER CARBONATE 1600 MG: 800 TABLET, FILM COATED ORAL at 10:03

## 2020-01-01 RX ADMIN — MIDODRINE HYDROCHLORIDE 10 MG: 5 TABLET ORAL at 10:42

## 2020-01-01 RX ADMIN — MIDODRINE HYDROCHLORIDE 10 MG: 5 TABLET ORAL at 09:41

## 2020-01-01 RX ADMIN — VANCOMYCIN HYDROCHLORIDE 125 MG: 125 CAPSULE ORAL at 17:34

## 2020-01-01 RX ADMIN — MICONAZOLE NITRATE: 20 POWDER TOPICAL at 09:16

## 2020-01-01 RX ADMIN — PREDNISONE 5 MG: 5 TABLET ORAL at 16:37

## 2020-01-01 RX ADMIN — Medication 40 MG: at 09:01

## 2020-01-01 RX ADMIN — Medication 5 MG: at 23:31

## 2020-01-01 RX ADMIN — VANCOMYCIN HYDROCHLORIDE 125 MG: 125 CAPSULE ORAL at 10:49

## 2020-01-01 RX ADMIN — VANCOMYCIN HYDROCHLORIDE 125 MG: 125 CAPSULE ORAL at 19:46

## 2020-01-01 RX ADMIN — IPRATROPIUM BROMIDE 2 SPRAY: 42 SPRAY NASAL at 08:26

## 2020-01-01 RX ADMIN — PREDNISONE 5 MG: 5 TABLET ORAL at 16:17

## 2020-01-01 RX ADMIN — OXYCODONE HYDROCHLORIDE 5 MG: 5 TABLET ORAL at 20:18

## 2020-01-01 RX ADMIN — CAPSAICIN: 0.75 CREAM TOPICAL at 07:54

## 2020-01-01 RX ADMIN — IPRATROPIUM BROMIDE 2 SPRAY: 42 SPRAY NASAL at 08:05

## 2020-01-01 RX ADMIN — RIFAXIMIN 550 MG: 550 TABLET ORAL at 08:26

## 2020-01-01 RX ADMIN — PANTOPRAZOLE SODIUM 40 MG: 40 TABLET, DELAYED RELEASE ORAL at 16:17

## 2020-01-01 RX ADMIN — RIFAXIMIN 550 MG: 550 TABLET ORAL at 08:27

## 2020-01-01 RX ADMIN — LACTULOSE 20 G: 20 POWDER, FOR SOLUTION ORAL at 10:04

## 2020-01-01 RX ADMIN — LIDOCAINE 1 PATCH: 560 PATCH PERCUTANEOUS; TOPICAL; TRANSDERMAL at 08:00

## 2020-01-01 RX ADMIN — VANCOMYCIN HYDROCHLORIDE 125 MG: 125 CAPSULE ORAL at 19:39

## 2020-01-01 RX ADMIN — PREDNISONE 5 MG: 5 TABLET ORAL at 16:24

## 2020-01-01 RX ADMIN — Medication 5 MG: at 21:59

## 2020-01-01 RX ADMIN — ONDANSETRON 4 MG: 4 TABLET, ORALLY DISINTEGRATING ORAL at 14:20

## 2020-01-01 RX ADMIN — INSULIN ASPART 1 UNITS: 100 INJECTION, SOLUTION INTRAVENOUS; SUBCUTANEOUS at 18:13

## 2020-01-01 RX ADMIN — LINEZOLID 600 MG: 600 INJECTION, SOLUTION INTRAVENOUS at 06:28

## 2020-01-01 RX ADMIN — PREDNISONE 5 MG: 5 TABLET ORAL at 15:57

## 2020-01-01 RX ADMIN — LINEZOLID 600 MG: 600 INJECTION, SOLUTION INTRAVENOUS at 22:26

## 2020-01-01 RX ADMIN — SEVELAMER CARBONATE 1600 MG: 800 TABLET, FILM COATED ORAL at 09:48

## 2020-01-01 RX ADMIN — OXYBUTYNIN CHLORIDE 15 MG: 10 TABLET, EXTENDED RELEASE ORAL at 08:04

## 2020-01-01 RX ADMIN — TAMSULOSIN HYDROCHLORIDE 0.4 MG: 0.4 CAPSULE ORAL at 07:59

## 2020-01-01 RX ADMIN — Medication 40 MG: at 10:55

## 2020-01-01 RX ADMIN — MELATONIN 50 MCG: at 09:41

## 2020-01-01 RX ADMIN — PREDNISONE 5 MG: 5 TABLET ORAL at 16:01

## 2020-01-01 RX ADMIN — VANCOMYCIN HYDROCHLORIDE 125 MG: 125 CAPSULE ORAL at 20:46

## 2020-01-01 RX ADMIN — LEVOTHYROXINE SODIUM 75 MCG: 0.05 TABLET ORAL at 08:41

## 2020-01-01 RX ADMIN — SODIUM BICARBONATE 650 MG TABLET 1300 MG: at 08:08

## 2020-01-01 RX ADMIN — SODIUM BICARBONATE 650 MG TABLET 1300 MG: at 07:55

## 2020-01-01 RX ADMIN — LACTULOSE 20 G: 20 POWDER, FOR SOLUTION ORAL at 12:59

## 2020-01-01 RX ADMIN — LEVOTHYROXINE SODIUM 75 MCG: 0.05 TABLET ORAL at 09:00

## 2020-01-01 RX ADMIN — OXYCODONE HYDROCHLORIDE 5 MG: 5 TABLET ORAL at 23:18

## 2020-01-01 RX ADMIN — Medication 2.5 MG: at 21:36

## 2020-01-01 RX ADMIN — MIDODRINE HYDROCHLORIDE 20 MG: 5 TABLET ORAL at 18:39

## 2020-01-01 RX ADMIN — PANTOPRAZOLE SODIUM 40 MG: 40 INJECTION, POWDER, FOR SOLUTION INTRAVENOUS at 08:06

## 2020-01-01 RX ADMIN — CALCIUM CARBONATE (ANTACID) CHEW TAB 500 MG 500 MG: 500 CHEW TAB at 20:18

## 2020-01-01 RX ADMIN — PANTOPRAZOLE SODIUM 40 MG: 40 TABLET, DELAYED RELEASE ORAL at 17:25

## 2020-01-01 RX ADMIN — PANTOPRAZOLE SODIUM 40 MG: 40 TABLET, DELAYED RELEASE ORAL at 09:31

## 2020-01-01 RX ADMIN — TAMSULOSIN HYDROCHLORIDE 0.4 MG: 0.4 CAPSULE ORAL at 08:42

## 2020-01-01 RX ADMIN — GABAPENTIN 300 MG: 300 CAPSULE ORAL at 21:38

## 2020-01-01 RX ADMIN — Medication 2.5 MG: at 18:09

## 2020-01-01 RX ADMIN — MIDODRINE HYDROCHLORIDE 20 MG: 5 TABLET ORAL at 11:16

## 2020-01-01 RX ADMIN — INSULIN ASPART 1 UNITS: 100 INJECTION, SOLUTION INTRAVENOUS; SUBCUTANEOUS at 17:47

## 2020-01-01 RX ADMIN — HYDROMORPHONE HYDROCHLORIDE 0.5 MG: 1 INJECTION, SOLUTION INTRAMUSCULAR; INTRAVENOUS; SUBCUTANEOUS at 11:59

## 2020-01-01 RX ADMIN — SEVELAMER CARBONATE 1600 MG: 800 TABLET, FILM COATED ORAL at 17:44

## 2020-01-01 RX ADMIN — PREDNISONE 5 MG: 5 TABLET ORAL at 16:36

## 2020-01-01 RX ADMIN — HYDROCORTISONE SODIUM SUCCINATE 25 MG: 100 INJECTION, POWDER, FOR SOLUTION INTRAMUSCULAR; INTRAVENOUS at 04:25

## 2020-01-01 RX ADMIN — RIFAXIMIN 550 MG: 550 TABLET ORAL at 21:02

## 2020-01-01 RX ADMIN — VANCOMYCIN HYDROCHLORIDE 125 MG: 125 CAPSULE ORAL at 08:42

## 2020-01-01 RX ADMIN — ALBUMIN HUMAN 25 G: 0.25 SOLUTION INTRAVENOUS at 18:38

## 2020-01-01 RX ADMIN — IPRATROPIUM BROMIDE 2 SPRAY: 42 SPRAY NASAL at 08:25

## 2020-01-01 RX ADMIN — INSULIN ASPART 1 UNITS: 100 INJECTION, SOLUTION INTRAVENOUS; SUBCUTANEOUS at 14:08

## 2020-01-01 RX ADMIN — GABAPENTIN 300 MG: 300 CAPSULE ORAL at 21:05

## 2020-01-01 RX ADMIN — LEVOTHYROXINE SODIUM 75 MCG: 0.05 TABLET ORAL at 08:10

## 2020-01-01 RX ADMIN — LINEZOLID 600 MG: 600 TABLET, FILM COATED ORAL at 07:58

## 2020-01-01 RX ADMIN — MICONAZOLE NITRATE: 20 POWDER TOPICAL at 20:15

## 2020-01-01 RX ADMIN — SODIUM CHLORIDE, POTASSIUM CHLORIDE, SODIUM LACTATE AND CALCIUM CHLORIDE 1000 ML: 600; 310; 30; 20 INJECTION, SOLUTION INTRAVENOUS at 14:04

## 2020-01-01 RX ADMIN — MELATONIN 50 MCG: at 08:18

## 2020-01-01 RX ADMIN — MIDODRINE HYDROCHLORIDE 10 MG: 5 TABLET ORAL at 08:04

## 2020-01-01 RX ADMIN — CALCIUM CARBONATE (ANTACID) CHEW TAB 500 MG 500 MG: 500 CHEW TAB at 02:33

## 2020-01-01 RX ADMIN — MICONAZOLE NITRATE: 20 POWDER TOPICAL at 20:00

## 2020-01-01 RX ADMIN — SEVELAMER CARBONATE 1600 MG: 800 TABLET, FILM COATED ORAL at 18:08

## 2020-01-01 RX ADMIN — Medication 0.5 MG: at 02:39

## 2020-01-01 RX ADMIN — PREDNISONE 5 MG: 5 TABLET ORAL at 16:43

## 2020-01-01 RX ADMIN — PANTOPRAZOLE SODIUM 40 MG: 40 INJECTION, POWDER, FOR SOLUTION INTRAVENOUS at 17:27

## 2020-01-01 RX ADMIN — MIDODRINE HYDROCHLORIDE 5 MG: 5 TABLET ORAL at 10:44

## 2020-01-01 RX ADMIN — INSULIN ASPART 1 UNITS: 100 INJECTION, SOLUTION INTRAVENOUS; SUBCUTANEOUS at 12:05

## 2020-01-01 RX ADMIN — RIFAXIMIN 550 MG: 550 TABLET ORAL at 19:28

## 2020-01-01 RX ADMIN — MELATONIN 50 MCG: at 09:17

## 2020-01-01 RX ADMIN — MICONAZOLE NITRATE: 20 POWDER TOPICAL at 08:28

## 2020-01-01 RX ADMIN — HYDROXYZINE HYDROCHLORIDE 10 MG: 10 TABLET ORAL at 03:13

## 2020-01-01 RX ADMIN — PROCHLORPERAZINE EDISYLATE 5 MG: 5 INJECTION INTRAMUSCULAR; INTRAVENOUS at 02:11

## 2020-01-01 RX ADMIN — RIFAXIMIN 550 MG: 550 TABLET ORAL at 08:04

## 2020-01-01 RX ADMIN — MIDODRINE HYDROCHLORIDE 10 MG: 5 TABLET ORAL at 18:13

## 2020-01-01 RX ADMIN — MIDODRINE HYDROCHLORIDE 20 MG: 5 TABLET ORAL at 08:27

## 2020-01-01 RX ADMIN — LIDOCAINE 1 PATCH: 560 PATCH PERCUTANEOUS; TOPICAL; TRANSDERMAL at 08:13

## 2020-01-01 RX ADMIN — SEVELAMER CARBONATE 1600 MG: 800 TABLET, FILM COATED ORAL at 09:17

## 2020-01-01 RX ADMIN — MICONAZOLE NITRATE: 20 POWDER TOPICAL at 08:55

## 2020-01-01 RX ADMIN — SODIUM BICARBONATE 650 MG TABLET 1300 MG: at 20:00

## 2020-01-01 RX ADMIN — CEFTRIAXONE 1 G: 1 INJECTION, POWDER, FOR SOLUTION INTRAMUSCULAR; INTRAVENOUS at 15:59

## 2020-01-01 RX ADMIN — MIDODRINE HYDROCHLORIDE 10 MG: 5 TABLET ORAL at 09:00

## 2020-01-01 RX ADMIN — PRAMIPEXOLE DIHYDROCHLORIDE 0.12 MG: 0.12 TABLET ORAL at 21:05

## 2020-01-01 RX ADMIN — MICONAZOLE NITRATE: 20 POWDER TOPICAL at 08:18

## 2020-01-01 RX ADMIN — TAMSULOSIN HYDROCHLORIDE 0.4 MG: 0.4 CAPSULE ORAL at 07:58

## 2020-01-01 RX ADMIN — GABAPENTIN 300 MG: 300 CAPSULE ORAL at 21:53

## 2020-01-01 RX ADMIN — PANTOPRAZOLE SODIUM 40 MG: 40 INJECTION, POWDER, FOR SOLUTION INTRAVENOUS at 20:28

## 2020-01-01 RX ADMIN — Medication 2.5 MG: at 11:26

## 2020-01-01 RX ADMIN — GABAPENTIN 300 MG: 300 CAPSULE ORAL at 21:08

## 2020-01-01 RX ADMIN — GABAPENTIN 300 MG: 300 CAPSULE ORAL at 21:31

## 2020-01-01 RX ADMIN — MICONAZOLE NITRATE: 20 POWDER TOPICAL at 22:17

## 2020-01-01 RX ADMIN — MIDODRINE HYDROCHLORIDE 10 MG: 5 TABLET ORAL at 13:30

## 2020-01-01 RX ADMIN — PANTOPRAZOLE SODIUM 40 MG: 40 INJECTION, POWDER, FOR SOLUTION INTRAVENOUS at 07:55

## 2020-01-01 RX ADMIN — IPRATROPIUM BROMIDE 2 SPRAY: 42 SPRAY NASAL at 09:57

## 2020-01-01 RX ADMIN — PANTOPRAZOLE SODIUM 40 MG: 40 TABLET, DELAYED RELEASE ORAL at 08:04

## 2020-01-01 RX ADMIN — LIDOCAINE 1 PATCH: 560 PATCH PERCUTANEOUS; TOPICAL; TRANSDERMAL at 08:09

## 2020-01-01 RX ADMIN — RIFAXIMIN 550 MG: 550 TABLET ORAL at 20:18

## 2020-01-01 RX ADMIN — CEFTRIAXONE 1 G: 1 INJECTION, POWDER, FOR SOLUTION INTRAMUSCULAR; INTRAVENOUS at 14:56

## 2020-01-01 RX ADMIN — PRAMIPEXOLE DIHYDROCHLORIDE 0.12 MG: 0.12 TABLET ORAL at 21:32

## 2020-01-01 RX ADMIN — IPRATROPIUM BROMIDE 2 SPRAY: 42 SPRAY NASAL at 10:05

## 2020-01-01 RX ADMIN — PREDNISONE 5 MG: 5 TABLET ORAL at 16:55

## 2020-01-01 RX ADMIN — PANTOPRAZOLE SODIUM 40 MG: 40 INJECTION, POWDER, FOR SOLUTION INTRAVENOUS at 08:28

## 2020-01-01 RX ADMIN — ONDANSETRON 4 MG: 2 INJECTION INTRAMUSCULAR; INTRAVENOUS at 17:01

## 2020-01-01 RX ADMIN — Medication 2.5 MG: at 00:30

## 2020-01-01 RX ADMIN — PANTOPRAZOLE SODIUM 40 MG: 40 INJECTION, POWDER, FOR SOLUTION INTRAVENOUS at 08:55

## 2020-01-01 RX ADMIN — LEVOTHYROXINE SODIUM 75 MCG: 0.05 TABLET ORAL at 08:08

## 2020-01-01 RX ADMIN — MICONAZOLE NITRATE: 20 POWDER TOPICAL at 08:26

## 2020-01-01 RX ADMIN — PREDNISONE 5 MG: 5 TABLET ORAL at 16:16

## 2020-01-01 RX ADMIN — MICONAZOLE NITRATE: 20 POWDER TOPICAL at 11:23

## 2020-01-01 RX ADMIN — ONDANSETRON 4 MG: 2 INJECTION INTRAMUSCULAR; INTRAVENOUS at 02:33

## 2020-01-01 RX ADMIN — MIDODRINE HYDROCHLORIDE 10 MG: 5 TABLET ORAL at 12:45

## 2020-01-01 RX ADMIN — PROCHLORPERAZINE EDISYLATE 10 MG: 5 INJECTION INTRAMUSCULAR; INTRAVENOUS at 14:12

## 2020-01-01 RX ADMIN — OXYCODONE HYDROCHLORIDE 5 MG: 5 TABLET ORAL at 19:49

## 2020-01-01 RX ADMIN — MELATONIN 50 MCG: at 09:47

## 2020-01-01 RX ADMIN — SODIUM CHLORIDE, POTASSIUM CHLORIDE, SODIUM LACTATE AND CALCIUM CHLORIDE: 600; 310; 30; 20 INJECTION, SOLUTION INTRAVENOUS at 08:29

## 2020-01-01 RX ADMIN — TAMSULOSIN HYDROCHLORIDE 0.4 MG: 0.4 CAPSULE ORAL at 09:41

## 2020-01-01 RX ADMIN — PRAMIPEXOLE DIHYDROCHLORIDE 0.12 MG: 0.12 TABLET ORAL at 23:02

## 2020-01-01 RX ADMIN — ONDANSETRON 4 MG: 2 INJECTION INTRAMUSCULAR; INTRAVENOUS at 08:42

## 2020-01-01 RX ADMIN — OCTREOTIDE ACETATE 50 MCG/HR: 200 INJECTION, SOLUTION INTRAVENOUS; SUBCUTANEOUS at 23:51

## 2020-01-01 RX ADMIN — Medication 5 MG: at 22:13

## 2020-01-01 RX ADMIN — Medication 15 ML: at 08:29

## 2020-01-01 RX ADMIN — Medication 0.2 MG: at 06:52

## 2020-01-01 RX ADMIN — MIDODRINE HYDROCHLORIDE 10 MG: 5 TABLET ORAL at 08:16

## 2020-01-01 RX ADMIN — SODIUM CHLORIDE, POTASSIUM CHLORIDE, SODIUM LACTATE AND CALCIUM CHLORIDE: 600; 310; 30; 20 INJECTION, SOLUTION INTRAVENOUS at 08:54

## 2020-01-01 RX ADMIN — LIDOCAINE 1 PATCH: 560 PATCH PERCUTANEOUS; TOPICAL; TRANSDERMAL at 09:20

## 2020-01-01 RX ADMIN — OCTREOTIDE ACETATE 50 MCG/HR: 200 INJECTION, SOLUTION INTRAVENOUS; SUBCUTANEOUS at 20:00

## 2020-01-01 RX ADMIN — MIDODRINE HYDROCHLORIDE 15 MG: 5 TABLET ORAL at 08:27

## 2020-01-01 RX ADMIN — GABAPENTIN 300 MG: 300 CAPSULE ORAL at 21:59

## 2020-01-01 RX ADMIN — PANTOPRAZOLE SODIUM 40 MG: 40 INJECTION, POWDER, FOR SOLUTION INTRAVENOUS at 20:52

## 2020-01-01 RX ADMIN — HYDROXYZINE HYDROCHLORIDE 10 MG: 10 TABLET ORAL at 20:18

## 2020-01-01 RX ADMIN — LACTULOSE 20 G: 20 POWDER, FOR SOLUTION ORAL at 14:38

## 2020-01-01 ASSESSMENT — ACTIVITIES OF DAILY LIVING (ADL)
ADLS_ACUITY_SCORE: 29
ADLS_ACUITY_SCORE: 28
DRESS: 2-->ASSISTIVE PERSON
ADLS_ACUITY_SCORE: 28
ADLS_ACUITY_SCORE: 28
ADLS_ACUITY_SCORE: 25
ADLS_ACUITY_SCORE: 28
ADLS_ACUITY_SCORE: 25
ADLS_ACUITY_SCORE: 29
ADLS_ACUITY_SCORE: 28
ADLS_ACUITY_SCORE: 29
ADLS_ACUITY_SCORE: 29
ADLS_ACUITY_SCORE: 28
ADLS_ACUITY_SCORE: 28
ADLS_ACUITY_SCORE: 22
ADLS_ACUITY_SCORE: 25
ADLS_ACUITY_SCORE: 28
ADLS_ACUITY_SCORE: 29
ADLS_ACUITY_SCORE: 28
ADLS_ACUITY_SCORE: 30
ADLS_ACUITY_SCORE: 28
ADLS_ACUITY_SCORE: 29
ADLS_ACUITY_SCORE: 28
ADLS_ACUITY_SCORE: 29
ADLS_ACUITY_SCORE: 28
ADLS_ACUITY_SCORE: 28
TRANSFERRING: 1-->ASSISTIVE EQUIPMENT
ADLS_ACUITY_SCORE: 23
ADLS_ACUITY_SCORE: 28
ADLS_ACUITY_SCORE: 29
ADLS_ACUITY_SCORE: 28
ADLS_ACUITY_SCORE: 24
ADLS_ACUITY_SCORE: 27
ADLS_ACUITY_SCORE: 28
ADLS_ACUITY_SCORE: 29
ADLS_ACUITY_SCORE: 27
ADLS_ACUITY_SCORE: 28
ADLS_ACUITY_SCORE: 26
ADLS_ACUITY_SCORE: 28
COGNITION: 1 - ATTENTION OR MEMORY DEFICITS
ADLS_ACUITY_SCORE: 28
ADLS_ACUITY_SCORE: 25
ADLS_ACUITY_SCORE: 27
ADLS_ACUITY_SCORE: 29
ADLS_ACUITY_SCORE: 28
ADLS_ACUITY_SCORE: 28
ADLS_ACUITY_SCORE: 27
ADLS_ACUITY_SCORE: 28
ADLS_ACUITY_SCORE: 25
ADLS_ACUITY_SCORE: 31
ADLS_ACUITY_SCORE: 28
ADLS_ACUITY_SCORE: 25
ADLS_ACUITY_SCORE: 30
ADLS_ACUITY_SCORE: 28
ADLS_ACUITY_SCORE: 25
ADLS_ACUITY_SCORE: 25
ADLS_ACUITY_SCORE: 23
ADLS_ACUITY_SCORE: 29
ADLS_ACUITY_SCORE: 28
ADLS_ACUITY_SCORE: 29
ADLS_ACUITY_SCORE: 25
ADLS_ACUITY_SCORE: 28
ADLS_ACUITY_SCORE: 28
ADLS_ACUITY_SCORE: 31
ADLS_ACUITY_SCORE: 28
ADLS_ACUITY_SCORE: 26
ADLS_ACUITY_SCORE: 28
ADLS_ACUITY_SCORE: 29
ADLS_ACUITY_SCORE: 28
BATHING: 2-->ASSISTIVE PERSON
ADLS_ACUITY_SCORE: 28
ADLS_ACUITY_SCORE: 27
ADLS_ACUITY_SCORE: 28
ADLS_ACUITY_SCORE: 31
ADLS_ACUITY_SCORE: 30
ADLS_ACUITY_SCORE: 25
ADLS_ACUITY_SCORE: 24
AMBULATION: 3-->ASSISTIVE EQUIPMENT AND PERSON
ADLS_ACUITY_SCORE: 25
ADLS_ACUITY_SCORE: 29
SWALLOWING: 0-->SWALLOWS FOODS/LIQUIDS WITHOUT DIFFICULTY
ADLS_ACUITY_SCORE: 23
ADLS_ACUITY_SCORE: 28
ADLS_ACUITY_SCORE: 29
ADLS_ACUITY_SCORE: 27
ADLS_ACUITY_SCORE: 25
ADLS_ACUITY_SCORE: 25
ADLS_ACUITY_SCORE: 31
ADLS_ACUITY_SCORE: 25
ADLS_ACUITY_SCORE: 25
ADLS_ACUITY_SCORE: 29
ADLS_ACUITY_SCORE: 27
ADLS_ACUITY_SCORE: 26
ADLS_ACUITY_SCORE: 25
ADLS_ACUITY_SCORE: 28
ADLS_ACUITY_SCORE: 23
ADLS_ACUITY_SCORE: 29
ADLS_ACUITY_SCORE: 30
ADLS_ACUITY_SCORE: 19
ADLS_ACUITY_SCORE: 23
ADLS_ACUITY_SCORE: 28
ADLS_ACUITY_SCORE: 28
ADLS_ACUITY_SCORE: 31
ADLS_ACUITY_SCORE: 24
ADLS_ACUITY_SCORE: 26
ADLS_ACUITY_SCORE: 24
ADLS_ACUITY_SCORE: 28
FALL_HISTORY_WITHIN_LAST_SIX_MONTHS: NO
ADLS_ACUITY_SCORE: 25
ADLS_ACUITY_SCORE: 27
ADLS_ACUITY_SCORE: 29
ADLS_ACUITY_SCORE: 25
PREVIOUS_RESPONSIBILITIES: MEAL PREP;MEDICATION MANAGEMENT;FINANCES
ADLS_ACUITY_SCORE: 29
ADLS_ACUITY_SCORE: 23
ADLS_ACUITY_SCORE: 28
TOILETING: 1-->ASSISTIVE EQUIPMENT
ADLS_ACUITY_SCORE: 23
ADLS_ACUITY_SCORE: 28
ADLS_ACUITY_SCORE: 25
ADLS_ACUITY_SCORE: 30
ADLS_ACUITY_SCORE: 28
ADLS_ACUITY_SCORE: 29
ADLS_ACUITY_SCORE: 23
ADLS_ACUITY_SCORE: 28
RETIRED_COMMUNICATION: 0-->UNDERSTANDS/COMMUNICATES WITHOUT DIFFICULTY
ADLS_ACUITY_SCORE: 29
ADLS_ACUITY_SCORE: 28
ADLS_ACUITY_SCORE: 29
ADLS_ACUITY_SCORE: 28
ADLS_ACUITY_SCORE: 31
ADLS_ACUITY_SCORE: 29
ADLS_ACUITY_SCORE: 28
ADLS_ACUITY_SCORE: 29
ADLS_ACUITY_SCORE: 24
ADLS_ACUITY_SCORE: 26
ADLS_ACUITY_SCORE: 30
ADLS_ACUITY_SCORE: 29
ADLS_ACUITY_SCORE: 28
ADLS_ACUITY_SCORE: 27
ADLS_ACUITY_SCORE: 25
ADLS_ACUITY_SCORE: 28
ADLS_ACUITY_SCORE: 23
RETIRED_EATING: 0-->INDEPENDENT
ADLS_ACUITY_SCORE: 25
ADLS_ACUITY_SCORE: 29
ADLS_ACUITY_SCORE: 29
ADLS_ACUITY_SCORE: 27
ADLS_ACUITY_SCORE: 23

## 2020-01-01 ASSESSMENT — PAIN DESCRIPTION - DESCRIPTORS
DESCRIPTORS: CONSTANT;ACHING;SHARP;PRESSURE
DESCRIPTORS: ACHING
DESCRIPTORS: CRAMPING;DISCOMFORT
DESCRIPTORS: CONSTANT;ACHING;SHARP;PRESSURE
DESCRIPTORS: ACHING
DESCRIPTORS: HEADACHE
DESCRIPTORS: ACHING;SHARP
DESCRIPTORS: ACHING
DESCRIPTORS: ACHING
DESCRIPTORS: SHARP;CRAMPING
DESCRIPTORS: CRAMPING
DESCRIPTORS: DISCOMFORT;CRAMPING
DESCRIPTORS: DISCOMFORT
DESCRIPTORS: ACHING
DESCRIPTORS: ACHING;SHOOTING
DESCRIPTORS: CRAMPING;DISCOMFORT
DESCRIPTORS: DISCOMFORT
DESCRIPTORS: ACHING;SHARP
DESCRIPTORS: DISCOMFORT
DESCRIPTORS: CRAMPING;DISCOMFORT
DESCRIPTORS: CRAMPING

## 2020-01-01 ASSESSMENT — MIFFLIN-ST. JEOR
SCORE: 1305.5
SCORE: 1362.5
SCORE: 1382.5
SCORE: 1331.5
SCORE: 1393.5
SCORE: 1378.5
SCORE: 1379.5
SCORE: 1330.5
SCORE: 1376.5
SCORE: 1281.5
SCORE: 1330.5

## 2020-07-28 PROBLEM — K74.60 DECOMPENSATED HEPATIC CIRRHOSIS (H): Status: ACTIVE | Noted: 2020-01-01

## 2020-07-28 PROBLEM — K72.90 DECOMPENSATED HEPATIC CIRRHOSIS (H): Status: ACTIVE | Noted: 2020-01-01

## 2020-07-28 NOTE — PLAN OF CARE
ICU End of Shift Summary. See flowsheets for vital signs and detailed assessment.    Changes this shift:     Neuro: alert, oriented x 4. Intermittently confused.   Cardiac: NSR, MAP > 65 without intervention  Respiratory: 3lpn added via nasal cannula, SpO2 < 92%$  GI: upgraded to clear liquid diet without reds/purples. Medications whole with water. BM x 1, smear, dark red. No cuba red bleeding noted.   : Wellington catheter patent, oliguric.   Skin: ecchymotic areas to the left buttock, left shoulder and left hand, present upon transfer. RUE fistula     Social: updated daughter-in-law Iraida via phone, informed of visitor policy.     Plan: monitor hemodynamics, signs of active bleeding and notify team of any changes. NPO after midnight for AM abdominal ultrasound. PIV to be inserted if needed for blood transfusion.

## 2020-07-28 NOTE — H&P
[facility] MICU History and Physical    Analy Martin MRN# [id number]   Age: 70 year old YOB: 1950     Date of Admission: 7/28/2020           Assessment and Plan:   Assessment:  Caryl Martin is a 71 yo woman with a history of IDDM c/b CKD stage IV, alcoholic cirrhosis with ascites, HE, and EV hospitalized at OSH with acute on chronic decompensated cirrhosis likely provoked by ESBL UTI transferred to ICU on 7/28/20 for worsening renal function, concern for possible need for continuous dialysis.     NEURO:  Delirium thought 2/2 UTI, improved  Hepatic encephalopathy, mild  Admitted to OSH with confusion, difficulty speaking. Brain MR showed mild chronic microvascular ischemic changes within cerebral white matter and small chronic cerebellar infarcts, no acute findings. Ultimately confusion thought 2/2 ESBL UTI for which she completed course of ertapenem and Flagyl (last dose 7/23). Ammonia slightly elevated at OSH to 40, and it was noted her mental status improved with lactulose. On admission, pt has some difficulty with word-finding, cannot recall some of the finer details of her hospitalization thus far, and loses train of thought easily. However, is A&Ox4 without asterixis or focal neuro deficits.   - Delirium precautions  - Check ammonia    CIDP on chronic steroids  Home dose 5mg prednisone. Received 50mg q6h hydrocortisone for stress dosing in setting of hypotension at OSH.   - No longer hypotensive, will wean stress-dosing to her PTA dose: plan for Hydrocortisone 50mg q12 for 1st 24h, then plan to step down to 25mg q12 for following 25h, then back to home dose 5mg prednisone     Neuropathy  - Continue PTA gabapentin 300 mg at bedtime    RLS  PTA pramipexole.   - Hold pramipexole for now to avoid polypharmacy      PULMONARY:  H/o hypoxia, resolved  Short of breath at OSH requiring supplemental O2 by NC to keep sats >92%. Thought 2/2 decompensated cirrhosis with ascites, anxiety. There was  also concern for aspiration pneumonia at OSH presumed treated by ertapenem and Flagyl prescribed for UTI. O2 sats in upper 90s on admission.   - Monitor respiratory status     CV:  Recent history of septic shock and hypovolemic shock 2/2 acute GI blood loss, resolved   Initially thought septic shock 2/2 UTI, then hypovolemic in setting of large volume blood loss with bleeding esophageal varices. Required intermittent pressure support with Levophed at OSH, weaned off morning prior to transfer.   - Continue to monitor     HFpEF  Echo at OSH from 7/20/20 with EF >75%, sclerotic mitral valve with trace MR, mitral stenosis. Elevated EF c/w high-output heart failure in setting of cirrhosis with ascites. Prior to admission at OSH was taking torsemide and spironolactone was started during admission, but both were later held 2/2 hypotension requiring pressor support.    - Continue to hold PTA torsemide for now--she is normotensive here but just weaned off pressors this AM  - Strict I/Os      RENAL:  Oliguric MARCELO on CKD IV 2/2 diabetic nephropathy  Baseline Cr 2-3, Cr 5.44 at OSH this morning with minimal UOP.   Renal US at OSH negative for mass or hydronephrosis. MARCELO likely multifactorial from ATN (shock) and possibly HRS. K 4.5.  - No acute need for dialysis at this time  - nephrology consult, appreciate recommendations  - continue HRS treatment with midodrine 10mg TID, octreotide  - strict I/Os  - PTA sodium bicarb and calcitriol for hyperparathyroidism 2/2 CKD      ID:  H/o ESBL UTI, resolved  Completed course of ertapenem and Flagyl on 7/23.     No active infection. SBP ppx with CTX given bleeding varices as below.      GI:  Acute on chronic decompensated EtOH cirrhosis with HE, EVs, ascites  Alcohol use disorder in remission  BRBPR on 7/27. Large EVs banded on 7/27 (5 bands) at [facility]. Large volume paracentesis (5L) 7/19 and 7/24; per family about 6 volodymyr over past few months.  - Continue octreotide gtt for at  "least 72 hours from banding   - Abx ppx as above for at least 5 days post-banding  - IV pantoprazole BID    MELD-Na score: 26 at 7/28/2020  2:09 PM  MELD score: 26 at 7/28/2020  2:09 PM  Calculated from:  Serum Creatinine: 4.61 mg/dL (Rounded to 4 mg/dL) at 7/28/2020  2:09 PM  Serum Sodium: 137 mmol/L at 7/28/2020  2:09 PM  Total Bilirubin: 1.2 mg/dL at 7/28/2020  2:09 PM  INR(ratio): 1.71 at 7/28/2020  2:09 PM  Age: 70 years 4 months      ENDO:  IDDM  Well-controlled, A1c 5.3 on 7/15/20. On glargine 14u HS. Has been on medium sliding scale insulin and 7 units glargine at bedtime at OSH.   - Continue medium dose sliding scale insulin  - Continue 7 Units glargine at bedtime  - Hypoglycemia protocol     Hypothyroidism   TSH 16.58, free T4 0.78 @ OSH.   - Continue PTA 75 mcg levothyroxine       HEME/ONC:  Acute on chronic normocytic anemia 2/2 UGIB  Hgb dropped 8.4>7.3>6.4 at OSH, received 3-4 units PRBCs while there and another en route. Large esophageal varices banded 7/27. Pt reportedly coughing up blood morning prior to transfer.   - Type and screen  - Transfuse <7  - PIV  - Recheck hgb at midnight     Thrombocytopenia of chronic liver disease  Platlets 58 on admission, consistent with her recent baseline.   - Trend      RHEUM/MSK:  Osteoarthritis  Longstanding, primarily in shoulders and right knee. Pt does not want APAP. Avoid opioids.  - Topical lidocaine for pain management    Gout  - Hold PTA allopurinol       FEN: clear liquids, no reds  PPx: pantoprazole as above  LINES: RIJ, Wellington catheter   CODE STATUS: DNR/DNI  DISPOSITION: likely transfer to floor in the AM    Patient seen and discussed with attending physician, Dr. Narendra Ovalle MD  Internal Medicine, PGY-1  Pager: [phone]             Chief Complaint:   \"Kidneys getting worse\"         History of present illness:   Analy Martin is a 70 year old woman with a hx of HTN, IDDM c/b CKD, wheelchair-bound 2/2 CIDP, morbid obesity, HFpEF, and " decompensated alcoholic cirrhosis with ascites, hepatic encephalopathy, and esophageal varices who was transferred for management of her acute on chronic decompensated cirrhosis with bleeding esophageal varices and progressive renal failure.    She was initially admitted at [facility] in [place] on 7/15 for septic shock 2/2 ESBL E.coli UTI. Course was complicated by acute UGIB 2/2 esophageal varices, banded on 7/27 and progressive renal failure, with concern for impending need of continuous dialysis, for which she is transferred to [facility].         Past Medical History:   HTN  Hypothyroidism  CKD IV 2/2 diabetic nephropathy  IDDM  Chronic decompensated cirrhosis 2/2 EtOH  Alcohol use disorder in remission (10 years)  Obesity  Chronic HFpEF  CIDP  Colovesicular fistula         Past Surgical History:   Splenectomy  Hysterectomy  Repair of colovesicular fistula         Social History:   Lives independently with home nursing care but has been in and out of TCU and hospital over the past 10-11 months per daughter-in-law.    H/o heavy alcohol use, sober for 10 years.          Family History:   Non-contributory         Allergies:   Imuran  Streptomycin  Prednisone--only eye drop form is an issue         Medications:       allopurinoL (ZYLOPRIM) 300 mg tablet Take 300 mg by mouth once daily.     calcitriol (ROCALTROL) 0.25 mcg capsule Take 0.25 mcg by mouth once daily. Once a day on Sunday, Tuesday, Thursday and Saturday     calcitriol (ROCALTROL) 0.5 mcg capsule Take 0.5 mcg by mouth every Monday, Wednesday and Friday.     cholecalciferol, Vitamin D3, 2,000 unit tablet Take 2,000 Units by mouth once daily.     gabapentin (NEURONTIN) 300 mg capsule Take 300 mg by mouth at bedtime.     glycerin-mineral oil-lanolin (EUCERIN PLUS INTENSIVE REPAIR) topical cream Apply topically to affected area(s) at bedtime. To Bilateral lower extremity     guaiFENesin (ROBITUSSIN) (100 mg in 5 mL) Take 10 mL by mouth every 4 hours if needed  for Expectoration.     insulin aspart U-100 (NOVOLOG) 100 unit/mL (3 mL) solution for injection Inject 2 Units subcutaneous 3 times daily before meals.     insulin glargine (LANTUS SOLOSTAR) 100 unit/mL (3 mL) pen Inject 14 Units subcutaneous before bedtime. Indications: diabetes     ipratropium (ATROVENT NASAL) 42 mcg (0.06 %) nasal spray Inhale 2 Sprays into both nostrils once daily.     iron,carbonyl-vitamin C (VITRON C) 65 mg iron- 125 mg Delayed-Release tablet Take 1 tablet by mouth once daily.     levothyroxine (SYNTHROID) 75 mcg tablet Take 1 tablet by mouth once daily. 90 tablet 0     Melatonin 5 mg tab Take 5 mg by mouth at bedtime.     nadolol (CORGARD) 20 mg as half tablet Take by mouth at bedtime.     omeprazole (PRILOSEC) 20 mg Delayed-Release capsule Take 20 mg by mouth 2 times daily before meals.     oxybutynin (DITROPAN XL) 15 mg CR tablet Take 15 mg by mouth once daily.     polyethylene glycol (MIRALAX) 17 g powder for solution Take 1 Packet by mouth once every other day.     polysaccharide iron complex (NIFEREX; FERREX) 150 mg iron capsule Take 150 mg by mouth once daily with a meal.     potassium chloride (KLOR-CON M20) 20 mEq Extended-Release tablet Take 20 mEq by mouth 2 times daily with meals.     pramipexole (MIRAPEX) 0.125 mg tablet Take 0.125 mg by mouth at bedtime. Indications: disorder characterized by stiff, tender & painful muscles     predniSONE (DELTASONE) 5 mg tablet Take 5 mg by mouth once daily with a meal. CIDP     sevelamer carbonate (RENVELA) 800 mg tab tablet Take 1,600 mg by mouth 3 times daily with meals.     sodium bicarbonate 650 mg tablet Take 1,300 mg by mouth 2 times daily.     tamsulosin (FLOMAX) 0.4 mg capsule Take 0.4 mg by mouth once daily after a meal.     torsemide (DEMADEX) 20 mg tablet Take 60 mg by mouth 2 times daily.     traMADoL (ULTRAM) 50 mg tablet Take 50 mg by mouth every 6 hours if needed for Pain.            Physical Exam:   Vitals were reviewed in  Epic    General: alert and oriented to place and date, slightly confused about care timeline, comfortable and in no distress  HEENT: Oral mucosa dry, bilateral submandibular swelling, PERRLA, EOM intact  CV: Regular, regular rate, normal S1S2, 3/6 blowing systolic murmur, no clicks or rubs  Resp: Clear to anterior auscultation bilaterally, no wheezes, rhonchi  Abd: Soft, distended with fluid wave, BS+, no masses appreciated  Extremities: Radial and pedal pulses intact and symmetric, 1+ pedal edema  Neuro: No lateralizing symptoms or focal neurologic deficits, difficult to assess for asterixis on left, none on right            Data:   No intake/output data recorded.  Recent Results (from the past 24 hour(s))   CBC with platelets    Collection Time: 07/28/20  2:09 PM   Result Value Ref Range    WBC 8.5 4.0 - 11.0 10e9/L    RBC Count 2.73 (L) 3.8 - 5.2 10e12/L    Hemoglobin 8.1 (L) 11.7 - 15.7 g/dL    Hematocrit 26.7 (L) 35.0 - 47.0 %    MCV 98 78 - 100 fl    MCH 29.7 26.5 - 33.0 pg    MCHC 30.3 (L) 31.5 - 36.5 g/dL    RDW 17.2 (H) 10.0 - 15.0 %    Platelet Count 58 (L) 150 - 450 10e9/L   Comprehensive metabolic panel    Collection Time: 07/28/20  2:09 PM   Result Value Ref Range    Sodium 137 133 - 144 mmol/L    Potassium 4.5 3.4 - 5.3 mmol/L    Chloride 112 (H) 94 - 109 mmol/L    Carbon Dioxide 19 (L) 20 - 32 mmol/L    Anion Gap 6 3 - 14 mmol/L    Glucose 233 (H) 70 - 99 mg/dL    Urea Nitrogen 63 (H) 7 - 30 mg/dL    Creatinine 4.61 (H) 0.52 - 1.04 mg/dL    GFR Estimate 9 (L) >60 mL/min/[1.73_m2]    GFR Estimate If Black 10 (L) >60 mL/min/[1.73_m2]    Calcium 8.6 8.5 - 10.1 mg/dL    Bilirubin Total 1.2 0.2 - 1.3 mg/dL    Albumin 2.6 (L) 3.4 - 5.0 g/dL    Protein Total 5.5 (L) 6.8 - 8.8 g/dL    Alkaline Phosphatase 80 40 - 150 U/L    ALT 8 0 - 50 U/L    AST 6 0 - 45 U/L   INR    Collection Time: 07/28/20  2:09 PM   Result Value Ref Range    INR 1.71 (H) 0.86 - 1.14   Magnesium    Collection Time: 07/28/20  2:09 PM    Result Value Ref Range    Magnesium 1.9 1.6 - 2.3 mg/dL   Phosphorus    Collection Time: 07/28/20  2:09 PM   Result Value Ref Range    Phosphorus 3.1 2.5 - 4.5 mg/dL   Glucose by meter    Collection Time: 07/28/20  2:54 PM   Result Value Ref Range    Glucose 210 (H) 70 - 99 mg/dL   Hemoglobin A1c    Collection Time: 07/28/20  4:50 PM   Result Value Ref Range    Hemoglobin A1C 6.4 (H) 0 - 5.6 %   Ammonia    Collection Time: 07/28/20  4:50 PM   Result Value Ref Range    Ammonia 35 10 - 50 umol/L   ABO/Rh type and screen    Collection Time: 07/28/20  4:50 PM   Result Value Ref Range    ABO PENDING     Antibody Screen PENDING     Test Valid Only At          [facility]    Specimen Expires 07/31/2020    Glucose by meter    Collection Time: 07/28/20  4:52 PM   Result Value Ref Range    Glucose 153 (H) 70 - 99 mg/dL   Glucose by meter    Collection Time: 07/28/20  5:32 PM   Result Value Ref Range    Glucose 199 (H) 70 - 99 mg/dL

## 2020-07-28 NOTE — CONSULTS
Nephrology Initial Consult  July 28, 2020      Analy Martin MRN:[id number] YOB: 1950  Date of Admission:7/28/2020  Primary care provider: No primary care provider on file.  Requesting physician: Mallory Mackey MD    ASSESSMENT AND RECOMMENDATIONS:   MARCELO on CKD4  MARCELO in the setting of septic shock and GI bleed prior to presentation.  Unclear etiology of CKD.  Baseline creatinine appears to be 2.3 to 2.7, peaked at 5.7.  Patient follows with Dr. Bobby Garza in the [facility] system.  The patient's creatinine appears stable to improved compared to prior noted at OSH, and she is making urine.  No urgent indication for dialysis at this time.  Will continue to monitor closely.  - Daily renal panel  - Avoid nephrotoxins  - Renally dose medications  - Renal diet  - Daily weights  - Strict I/Os  - Resume PTA sodium bicarb 1300 mg twice daily  - Consent for RRT obtained and placed in patient's chart  - Will need access if need for RRT arises    Metabolic acidosis  Likely in the setting of kidney injury as above    Anemia in the setting of blood loss  Transfusion per primary team  - Please obtain iron studies    Volume/BP  Likely euvolemic, normal blood pressure  On 3 times daily midodrine PTA    BMD  Calcium and phosphorous at goal  - Resume PTA sevelamer 1600 mg 3 times daily with meals    Recommendations were communicated to primary team via this note    Seen and discussed with Dr. 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 Course was complicated by acute UGIB 2/2 esophageal varices,  banded on 7/27. Developed progressive renal failure, and transferred to [facility] with concern for impending need of RRT.     Patient notes she feels well, denies abdominal pain, nausea, cough, fevers/chills, chest pain.  Notes chronic leg edema which is stable.    History from Care Everywhere:    HISTORY OF PRESENT ILLNESS: Analy Martin is a 70 y.o. female who is here with a urinary tract infection ad decompensated liver failure. She has a very long history of chronic kidney disease. Her primary medical issue prior to 2010 was a longstanding chronic inflammatory demyelinating polyneuropathy. She was on immunosuppression with azathioprine and prednisone. Her issues with kidney disease started in 2010, when she had acute kidney injury following a very complicated colovesical fistula repair. It was then that she was also found to have cirrhosis from alcohol. She was followed by [facility] at the time. Her creatinine rubio to the 4-5 range by 2012. She actually had an AVF placed, both for hemodialysis access and apheresis access. The apheresis was supposed for the demyelinating polyneuropathy. The records in Epic are spotty, but it appears her AVF failed and she had no other options for term access. Somewhere along the way, her creatinine must have improved and stabilized, as there is nothing in the system from nephrology until 2016 when she moved and established nephrology care with [facility] (formally [facility]). Her creatinine then was in the 2-3 range. It stayed in the 2-3 range for the next several years until now.   She appears to have been fairly stable until 5/2020. She was admitted to [facility] with anemia. She was readmitted in 6/2020 with a urinary tract infection. She needed paracentesis in 6/2020. This seems to have tipped off some liver decompensation, as her ascites accumulation, chronic kidney disease, and anemia have all started to spiral out of control. Other background medical issues  include diabetes, hypertension, hypothyroidism, morbid obesity,and a chronic deconditioned state.   Her creatinine had been in the mid to uppers 2 in the past month or two. She has needed a few transfusions. Her EGD showed grade I varices and hypertensive gastropathy. She was started on Aranesp for anemia of chronic kidney disease by nephrology. This time, she was admitted with an ESBL E coli urinary tract infection and hepatic encephalopathy with an elevated ammonia.   She has been hypotensive. She has been third spacing. She has needed two high volume paracentesis at 5.2L each within 5 days of each other. Her creatinine has been up and down form the 2s to the 3s, corresponding to attempts at more aggressive diuresis. She has bene in the ICU on pressors. She was given IV lasix, IV fluids, IV albumin, torsemide, midodrine, nadolol, spironolactone, and various different antibiotics at various times this admission. Her creatinine seemed to be trending down from the 3s to the 2s a couple days ago, but after her last oral torsemide pills a couple days ago, her creatinine trended up. She was in the 3s yesterday and the 4s today. She has not gotten any loop diuretic in 2 days now, but she is back on the spironolactone now.   GI has been following her for her liver disease. They recommend spironolactone and midodrine. They stopped the nadolol. They do not recommend a TIPS despite her increased dependence on paracentesis given her history of hepatic encephalopathy. The patient is familiar with the discussion of hemodialysis as she was told she would need it back in 2012. We talked about this today.   I do not feel that hemodialysis is going to be well tolerated or even have any longevity benefit. It will not solve any of her issues with her decompensated cirrhosis as we can not pull blood pressure with her low blood pressure. The role of hemodialysis would merely be toxin clearance with the downside of increasing mortality  risk from hypotension and infection among other run-related side effects.   However, the patient is interested in pursuing hemodialysis if it gets to that point. Hospice has already been discussed, and she is not interested. She wants to continue to pursue aggressive therapy. I am not sure she really understands how devastating hemodialysis will be without providing any real benefit aside form toxin control. But for now, hemodialysis is not indicated. We will continue to trend out creatinine and electrolytes. Maybe her renal function will improve some over time after being off the loop diuretics for longer. I do not expect any renal impact of the aldosterone antagonist until maybe 1-2 weeks as that is how long they take to work effectively.     7/25  1. RENAL FUNCTION: acute renal failure on chronic kidney disease from decompensated liver disease and possible from her urinary tract infection   - this is not hepatorenal syndrome; her creatinine has been up and down this admission and there is a pattern with diuretic administration   - renal ultrasound given her history of urologic issues   - can not rule out an acute interstitial nephritis considering the various antibiotics she has yuli exposed to this admission   - no need for hemodialysis at this point; will trend out creatinine   2. BLOOD PRESSURE: hypotensive; will need increased blood pressure if she is to tolerate an aldosterone antagonist   - will schedule low dose midodrine for increased blood pressure and liver perfusion   - on long term prednisone; does not seem to be a role for stress dosed steroids yet   3. VOLUME STATUS: the two high volume taps at only 5 days apart is a bad sign but clearly she can not tolerate loop diuretics   - do not anticipate hemodialysis will be able to remove fluid as mere toxin clearance itself is likely to lower blood pressure   4. ELECTROLYTES: no issues with potassium but she has not had an actual basic metabolic panel in a  couple days   - on oral bicarbonate already   5. HEME: anemic; not meeting hemoglobin goal 10-12  - was getting IV iron and Aranesp as outpatient   - cannot give IV iron in the face of an active infection   - can give some erythropoietin, but if iron stores are poor, this will likely not be effective (and there is erythropoietin resistance with inflammatory state)  - anticipate she will be transfusion dependent for now   6. MEDICATIONS: reviewed; no other renal dosage adjustment needed     7/27  1. RENAL FUNCTION: creatinine trending up at a rate that would imply it is not going to plateau on its own; she will need renal replacement therapy and she seems to be agreeable to it; she is on pressors now, and she will need access  - GI has her NPO for a possible scope this morning; there is a family conference this afternoon; however, I expect she is going to want to be aggressive per our previous discussion and the discussion today; this will necessitate a transfer to a center with CVVHD due to hypotension and current pressor dependence (not to mention what will happen to her blood pressure after an intervention today)   2. BLOOD PRESSURE: hypotensive; on pressors   3. VOLUME STATUS: abdomen still not as tense although fluid wave still present   4. ELECTROLYTES: bicarbonate trending down consistent with acute renal failure and ongoing diarrhea   5. HEME: anemic; grossly bloody stool output; already had transfusion; GI may do intervention if she remains stable enough   6. MEDICATIONS: reviewed; no renal dosage adjustment needed     PAST MEDICAL HISTORY:  Reviewed with patient on 07/28/2020   As noted above    MEDICATIONS:  PTA Meds  Prior to Admission medications    Not on File      Current Meds    cefTRIAXone  1 g Intravenous Q24H     gabapentin  300 mg Oral At Bedtime     hydrocortisone sodium succinate PF  50 mg Intravenous Q12H    Followed by     [START ON 7/29/2020] hydrocortisone sodium succinate PF  25 mg  Intravenous Q12H     insulin aspart  1-7 Units Subcutaneous TID AC     insulin aspart  1-5 Units Subcutaneous At Bedtime     insulin glargine  7 Units Subcutaneous At Bedtime     [START ON 7/29/2020] levothyroxine  75 mcg Oral QAM AC     [START ON 7/29/2020] lidocaine  1 patch Transdermal Q24H     lidocaine   Transdermal Q8H     midodrine  10 mg Oral TID w/meals     pantoprazole (PROTONIX) IV  40 mg Intravenous BID     [START ON 7/30/2020] predniSONE  5 mg Oral Daily     Infusion Meds    octreotide (sandoSTATIN) infusion ADULT 50 mcg/hr (07/28/20 1800)     ALLERGIES:    No Known Allergies    REVIEW OF SYSTEMS:  A comprehensive of systems was negative except as noted above.    SOCIAL HISTORY:   Social History     Socioeconomic History     Marital status: Not on file     Spouse name: Not on file     Number of children: Not on file     Years of education: Not on file     Highest education level: Not on file   Occupational History     Not on file   Social Needs     Financial resource strain: Not on file     Food insecurity     Worry: Not on file     Inability: Not on file     Transportation needs     Medical: Not on file     Non-medical: Not on file   Tobacco Use     Smoking status: Not on file   Substance and Sexual Activity     Alcohol use: Not on file     Drug use: Not on file     Sexual activity: Not on file   Lifestyle     Physical activity     Days per week: Not on file     Minutes per session: Not on file     Stress: Not on file   Relationships     Social connections     Talks on phone: Not on file     Gets together: Not on file     Attends [employer] service: Not on file     Active member of club or organization: Not on file     Attends meetings of clubs or organizations: Not on file     Relationship status: Not on file     Intimate partner violence     Fear of current or ex partner: Not on file     Emotionally abused: Not on file     Physically abused: Not on file     Forced sexual activity: Not on file   Other  Topics Concern     Not on file   Social History Narrative     Not on file       FAMILY MEDICAL HISTORY:   Patient notes her daughter has received a kidney transplant, she does not know the cause of her renal disease    PHYSICAL EXAM:   Temp  Av.9  F (36.6  C)  Min: 97.8  F (36.6  C)  Max: 97.9  F (36.6  C)      Pulse  Av.4  Min: 68  Max: 74 Resp  Av.8  Min: 9  Max: 18  SpO2  Av.8 %  Min: 96 %  Max: 100 %       /53   Pulse 70   Temp 97.9  F (36.6  C) (Oral)   Resp 17   Ht 1.524 m (5')   Wt 92.6 kg (204 lb 2.3 oz)   SpO2 96%   BMI 39.87 kg/m     Date 20 0700 - 20 0659   Shift 1770-8363 5881-9543 8678-0264 24 Hour Total   INTAKE   I.V.  39.83  39.83   Shift Total(mL/kg)  39.83(0.43)  39.83(0.43)   OUTPUT   Urine 35 50  85   Shift Total(mL/kg) 35(0.38) 50(0.54)  85(0.92)   Weight (kg) 92.6 92.6 92.6 92.6      Admit Weight: 92.6 kg (204 lb 2.3 oz)     GENERAL APPEARANCE: In no distress, alert, awake  EYES: No scleral icterus, pupils equal  Endo: No goiter  Pulmonary: lungs clear to auscultation with equal breath sounds bilaterally  CV: regular rhythm, normal rate, no rub   - JVD no   - Edema trace  GI: soft, nontender, normal bowel sounds  MS: no evidence of inflammation in joints  : matthews present  SKIN: no rash on exposed surfaces  NEURO: face symmetric, following commands, moving all extremities    LABS:   CMP  Recent Labs   Lab 20  1409      POTASSIUM 4.5   CHLORIDE 112*   CO2 19*   ANIONGAP 6   *   BUN 63*   CR 4.61*   GFRESTIMATED 9*   GFRESTBLACK 10*   LUIS ALFREDO 8.6   MAG 1.9   PHOS 3.1   PROTTOTAL 5.5*   ALBUMIN 2.6*   BILITOTAL 1.2   ALKPHOS 80   AST 6   ALT 8     CBC  Recent Labs   Lab 20  1409   HGB 8.1*   WBC 8.5   RBC 2.73*   HCT 26.7*   MCV 98   MCH 29.7   MCHC 30.3*   RDW 17.2*   PLT 58*     INR  Recent Labs   Lab 20  1409   INR 1.71*     ABGNo lab results found in last 7 days.   URINE STUDIES  No lab results found.  No lab results  found.  PTH  No lab results found.  IRON STUDIES  No lab results found.    IMAGING:    Endoscopy 2020    Impressions/Post-Op Diagnosis:       - Large (> 5 mm) esophageal varices. Completely eradicated. Banded.       - Red blood in the gastric body.       - Normal antrum.       - Normal first portion of the duodenum and second portion of the        duodenum.       - No specimens collected.       - In summary, large quantity of blood in the stomach with multiple        columns of large varices with red jennifer sign and one with nipple sign        indicated recent bleeding. There was no obvious gastric varices but        limited exam due to large quantity of blood in stomach. Total of 5 bands        deployed at the sites with nipple or red jennifer signs.    Ultrasound renal bilateral . Gray scale and color Doppler sonographic images were acquired of the kidneys and urinary bladder.     COMPARISON:  None     FINDINGS:  The right kidney measures 8.4 x 3.7 x 4.7 cm. No mass or hydronephrosis.   The left kidney measures 8.9 x 3.9 x 4.4 cm. No mass or hydronephrosis.   The bladder is decompressed, limiting evaluation.     IMPRESSION:  No renal mass or hydronephrosis.    ECHOCARDIOGRAM     PATRICIA VANN  MRN:    [id number]         Accession#:   [id number]  :    1950 70 years Study Date:   2020 3:25:07 PM  Gender: F                  BP:           106/43 mmHg  Height: 157.48 cm          BSA:          2.02 mÂ   Weight: 102.97 kg          Tech:                                      Referring MD: LAN MILTON  Site:             [facility]  Reading Location: [facility]      Procedure: Limited 2D , Limited Spectral Doppler and Color Doppler.    Indication for study: HYPOTENSION  Cardiac Rhythm: Irregular.Study quality: Technically limited.    Final Impressions:  Limited Echocardiogram performed   1. Technically limited exam.   2. Normal LV size, normal wall thickness, hyperdynamic  global systolic function with an estimated EF of > 75%.   3. Right ventricular cavity size is normal, global systolic RV function is normal.   4. The aortic valve is not well visualized, moderate stenosis and trivial regurgitation.   5. The aortic valve peak velocity is 3.3 m/s, the peak gradient is 44 mmHg, and the mean gradient is 21 mmHg. The aortic valve area is 1.04 cmÂ  with a dimensionless index of 0.41.   6. The mitral valve is sclerotic, trace mitral regurgitation.   7. Mitral stenosis with severely increased mean gradient of 10.0 mmHg. HR 68 BPM.   8. Severely enlarged left atrium.   9. The inferior vena cava is dilated, respiratory size variation not well visualized.    Malcolm Bautista MD

## 2020-07-29 NOTE — PHARMACY-ADMISSION MEDICATION HISTORY
Admission Medication History Completed by Pharmacy    See [facility] Admission Navigator for allergy information, preferred outpatient pharmacy, prior to admission medications and immunization status.     Medication History Sources:     [facility]'s record via [employer], including admission medication history note and MAR    Changes made to PTA medication list (reason):    Added: all medications were added to list (PTA med list was empty prior to this update)      Deleted: None    Changed: None    Additional Information:    [facility], nadolol, torsemide, tramadol, and potassium chloride were all held at the OSH intentionally. In addition, allopurinol was adjusted from 300 mg to 100 mg on 7/27, likely due to renal function.    Prior to admission to the OSH, patient takes Lantus 14 units at bedtime, and Novolog 2 units three times daily before meals. At the OSH, he was receiving 7 units of Lantus from 7/21 to 7/27, and was supposed to be increased to 10 units on 7/28. She was only getting Novolog on a sliding scale there.    Prior to Admission medications    Medication Sig Last Dose Taking? Auth Provider   acetaminophen (TYLENOL) 500 MG tablet Take 1,000 mg by mouth every 8 hours as needed  PRN Yes Unknown, Entered By History   allopurinol (ZYLOPRIM) 300 MG tablet Take 300 mg by mouth daily 7/26/2020 Yes Unknown, Entered By History   calcitRIOL (ROCALTROL) 0.25 MCG capsule Take 0.25 mcg by mouth every Sunday, Tuesday, Thursday, and Saturday. 7/26/2020 Yes Unknown, Entered By History   calcitRIOL (ROCALTROL) 0.5 MCG capsule Take 0.5 mcg by mouth Take 0.5 mcg by mouth every Monday, Wednesday, and Friday. 7/27/2020 Yes Unknown, Entered By History   ferrous fumarate 65 mg, [place]. FE,-Vitamin C 125 mg (VITRON-C)  MG TABS tablet Take 1 tablet by mouth daily Past Month Yes Unknown, Entered By History   gabapentin (NEURONTIN) 300 MG capsule Take 300 mg by mouth At Bedtime 7/27/2020 Yes Unknown, Entered By  History   guaiFENesin (ROBITUSSIN) 100 MG/5ML SYRP Take 10 mLs by mouth every 4 hours as needed for cough  PRN Yes Unknown, Entered By History   insulin aspart (NOVOLOG FLEXPEN) 100 UNIT/ML pen Inject 2 Units Subcutaneous 3 times daily (before meals) 7/27/2020 Yes Unknown, Entered By History   insulin glargine (LANTUS PEN) 100 UNIT/ML pen Inject 14 Units Subcutaneous At Bedtime 7/27/2020 Yes Unknown, Entered By History   ipratropium (ATROVENT) 0.06 % nasal spray Spray 2 sprays in nostril daily 7/26/2020 Yes Unknown, Entered By History   iron polysaccharides (NU-IRON) 150 MG capsule Take 150 mg by mouth daily (with dinner) Past Month Yes Unknown, Entered By History   levothyroxine (SYNTHROID/LEVOTHROID) 75 MCG tablet Take 75 mcg by mouth daily 7/27/2020 Yes Unknown, Entered By History   melatonin (MELATONIN MAXIMUM STRENGTH) 5 MG tablet Take 5 mg by mouth At Bedtime 7/27/2020 Yes Unknown, Entered By History   omeprazole (PRILOSEC) 20 MG DR capsule Take 20 mg by mouth 2 times daily (before meals) Past Month Yes Unknown, Entered By History   oxybutynin ER (DITROPAN XL) 15 MG 24 hr tablet Take 15 mg by mouth daily 7/27/2020 Yes Unknown, Entered By History   polyethylene glycol (MIRALAX) 17 g packet Take 1 packet by mouth every other day Past Month Yes Unknown, Entered By History   pramipexole (MIRAPEX) 0.125 MG tablet Take 0.125 mg by mouth At Bedtime 7/27/2020 Yes Unknown, Entered By History   predniSONE (DELTASONE) 5 MG tablet Take 5 mg by mouth daily 7/27/2020 Yes Unknown, Entered By History   sevelamer carbonate (RENVELA) 800 MG tablet Take 1,600 mg by mouth 3 times daily (with meals) 7/26/2020 Yes Unknown, Entered By History   Skin Protectants, Misc. (EUCERIN) cream Apply topically At Bedtime Past Month Yes Unknown, Entered By History   sodium bicarbonate 650 MG tablet Take 1,300 mg by mouth 2 times daily 7/27/2020 Yes Unknown, Entered By History   tamsulosin (FLOMAX) 0.4 MG capsule Take 0.4 mg by mouth daily  7/27/2020 Yes Unknown, Entered By History   vitamin D3 (CHOLECALCIFEROL) 50 mcg (2000 units) tablet Take 1 tablet by mouth daily Past Month Yes Unknown, Entered By History   nadolol (CORGARD) 20 MG tablet Take 20 mg by mouth At Bedtime HOLD AT OSH  Unknown, Entered By History   potassium chloride ER (KLOR-CON M) 20 MEQ CR tablet Take 20 mEq by mouth 2 times daily HOLD AT OSH  Unknown, Entered By History   torsemide (DEMADEX) 20 MG tablet Take 60 mg by mouth 2 times daily HOLD AT OSH  Unknown, Entered By History   traMADol (ULTRAM) 50 MG tablet Take 50 mg by mouth every 6 hours as needed HOLD AT OSH  Unknown, Entered By History        Date completed: 07/29/20    Medication history completed by: Fannie Zaldivar, PharmD, BCPS  7/29/2020 4:15 PM

## 2020-07-29 NOTE — PLAN OF CARE
ICU End of Shift Summary. See flowsheets for vital signs and detailed assessment.    Changes this shift: Patient alert, oriented x4 but forgetful at times. Moves all extremities, makes needs known. VSS on room air. Clear liquid diet, patient nauseous after PO intake with some chest pressure- EKG obtained. Patient making ~15 ml/hr urine, [place] remains for strict I+O. 2 medium sized, black/maroon loose stools. Up to chair with lift.     Plan: Continue plan of care. Transfer to med/surg when bed available.

## 2020-07-29 NOTE — PROGRESS NOTES
".SPIRITUAL HEALTH SERVICES: Tele-Encounter  Patient Location (Hospital, Bank, Unit): [facility] 4C  Spoke with (patient, family relationship): patient      Referral Source: hospital  request at admission    If applicable: patient was appropriately screened for telechaplaincy support with bedside nurse prior to visit (e.g. Mental Health and Addiction contexts). See call details below.    DATA:  Analy requested a  because she had seen a  at the previous facility and he said he would follow up, but then she moved.  She's \"moving to a regular unit\" today.  I offered a prayer of thanksgiving.  No other SH needs identified at this time.  She says she does not need anointing.       PLAN:  Follow while on unit unless  prefers to take-over.      Samantha Moffett   Intern  Voicemail: [phone]  ______________________________    Type of service:  Telephone Visit     has received verbal consent for a TelephoneVisit from the patient? Yes    Distance Provider Location: designated [place] office or home office (secure setting)    Mode of Communication: telephone (via [employer] phone or [employer] tele-call-number ([phone]))    * [facility] remains available 24/7 for emergent requests/referrals, either by having the switchboard page the on-call  or by entering an ASAP/STAT consult in Epic (this will also page the on-call ). Routine Epic consults receive an initial response within 24 hours.*  "

## 2020-07-29 NOTE — PLAN OF CARE
Discharge Planner OT   Patient plan for discharge: Pt did not state.   Current status: Pt completes bed mobility with mod A to manage B LEs, min-mod A to manage UB with HOB elevated. Mod A scoot hips towards EOB. STS x 3 with mod A x 2, unable to shift weight or  feet from ground. Lift to chair. Hypertensive, see flowsheet.   Barriers to return to prior living situation: Medical status, decreased I with transfers, decreased I with ADLs/IADLs, weakness  Recommendations for discharge: TCU  Rationale for recommendations: To address above deficits. Pt is lift dependent and below functional baseline with ADLs.        Entered by: Tamera Reed 07/29/2020 3:12 PM     OT 4C

## 2020-07-29 NOTE — PROGRESS NOTES
Nephrology Progress Note  07/29/2020         Assessment & Recommendations:   Analy Martin is a 70 year old year old female with PMH HTN, IDDM c/b CKD, wheelchair-bound 2/2 CIDP, morbid obesity, HFpEF, and decompensated alcoholic cirrhosis with ascites, hepatic encephalopathy and esophageal varices who presented to [facility] on 7/15 for septic shock 2/2 ESBL E.coli UTI. Course was complicated by acute UGIB 2/2 esophageal varices, banded on 7/27. Developed progressive renal failure, and transferred to [facility] with concern for impending need of RRT.    MARCELO on CKD4  AMRCELO in the setting of septic shock and GI bleed prior to presentation.  Unclear etiology of CKD.  Baseline creatinine appears to be 2.3 to 2.7, peaked at 5.7.  Patient follows with Dr. Bobby Garza in the [facility] system.  The patient's creatinine appears stable to improved compared to prior noted at OSH.  She remains oliguric.  No urgent indication for dialysis at this time and will continue to monitor closely.  - Daily renal panel  - Avoid nephrotoxins  - Renally dose medications  - Renal diet  - Daily weights  - Strict I/Os  - Resume PTA sodium bicarb 1300 mg twice daily  - Consent for RRT obtained and placed in patient's chart     Metabolic acidosis  Likely in the setting of kidney injury as above     Anemia in the setting of blood loss  Transfusion per primary team  - Iron studies without evidence of YAHIR    Volume/BP  Likely euvolemic, normal blood pressure  On 3 times daily midodrine PTA     BMD  Calcium and phosphorous at goal  - Resume PTA sevelamer 1600 mg 3 times daily with meals     Recommendations were communicated to primary team via this note     Seen and discussed with Dr. Steve Bautista MD   Renal fellow  [phone]    Interval History :   Nursing and provider notes from last 24 hours reviewed.  In the last 24 hours Analy Martin remained clinically stable. Denies any new concerns. Remains  oliguric.    Review of Systems:   I reviewed the following systems:  GI: Good appetite. no nausea or vomiting or diarrhea.   Neuro:  no confusion  Constitutional:  no fever or chills  CV: no dyspnea or edema.  no chest pain.    Physical Exam:   I/O last 3 completed shifts:  In: 1634.83 [P.O.:1300; I.V.:334.83]  Out: 342 [Urine:342]   /42   Pulse 70   Temp 97.5  F (36.4  C) (Oral)   Resp 18   Ht 1.524 m (5')   Wt 94.2 kg (207 lb 10.8 oz)   SpO2 95%   BMI 40.56 kg/m       GENERAL APPEARANCE: In no distress, alert, awake  EYES: No scleral icterus, pupils equal  Pulmonary: lungs clear to auscultation with equal breath sounds bilaterally  CV: regular rhythm, normal rate   - Edema trace  GI: soft, nontender, normal bowel sounds  : matthews present  SKIN: no rash on exposed surfaces, chronic changes on BLE  NEURO: face symmetric, following commands, moving all extremities    Labs:   All labs reviewed by me  Electrolytes/Renal -   Recent Labs   Lab Test 07/29/20 0420 07/28/20  1409    137   POTASSIUM 4.9 4.5   CHLORIDE 113* 112*   CO2 18* 19*   BUN 70* 63*   CR 4.49* 4.61*   * 233*   LUIS ALFREDO 8.5 8.6   MAG  --  1.9   PHOS  --  3.1       CBC -   Recent Labs   Lab Test 07/29/20 0420 07/28/20  2343 07/28/20  1409   WBC 8.5  --  8.5   HGB 7.9* 8.1* 8.1*   PLT 63*  --  58*       LFTs -   Recent Labs   Lab Test 07/29/20 0420 07/28/20  1409   ALKPHOS 80 80   BILITOTAL 1.1 1.2   ALT 9 8   AST 10 6   PROTTOTAL 5.4* 5.5*   ALBUMIN 2.5* 2.6*       Iron Panel -   Recent Labs   Lab Test 07/29/20 0420   IRON 51   IRONSAT 47*   TAMARA 352*       Imaging:  All imaging studies reviewed by me.     Current Medications:    alum & mag hydroxide-simethicone  30 mL Oral Once     cefTRIAXone  1 g Intravenous Q24H     gabapentin  300 mg Oral At Bedtime     hydrocortisone sodium succinate PF  25 mg Intravenous Q12H     insulin aspart  1-7 Units Subcutaneous TID AC     insulin aspart  1-5 Units Subcutaneous At Bedtime     insulin  glargine  10 Units Subcutaneous At Bedtime     levothyroxine  75 mcg Oral QAM AC     lidocaine  1 patch Transdermal Q24H     lidocaine   Transdermal Q8H     midodrine  10 mg Oral TID w/meals     pantoprazole (PROTONIX) IV  40 mg Intravenous BID     [START ON 7/30/2020] predniSONE  5 mg Oral Daily     sevelamer carbonate  1,600 mg Oral TID w/meals     sodium bicarbonate  1,300 mg Oral BID     sodium chloride (PF)  3 mL Intracatheter Q8H       octreotide (sandoSTATIN) infusion ADULT 50 mcg/hr (07/29/20 1700)     Malcolm Bautista MD

## 2020-07-29 NOTE — PROGRESS NOTES
07/29/20 1319   Quick Adds   Type of Visit Initial Occupational Therapy Evaluation   Living Environment   Lives With other (see comments)  (ILF)   Living Arrangements independent living facility   Home Accessibility wheelchair accessible   Living Environment Comment Pt was at [facility] until March 1st, at which point she returned home. Pt lives in [facility], is on 1st floor. Pt has good amount of grab bars by the shower and toilet. Has a shower chair, non slip strips in shower, has long handled sponge. Has lift recliner. Has an extended toilet, plans to get a riser.    Self-Care   Usual Activity Tolerance moderate   Equipment Currently Used at Home dressing device;grab bar, toilet;grab bar, tub/shower;hospital bed;raised toilet;shower chair;other (see comments)  (bedrail, leg , grabber, sock aid, scooter,transport wc)   Activity/Exercise/Self-Care Comment Was an LPN for 42 years. Has been primarily in w/c since 2012 -- scooter w/[employer]. Has had issues w/ scooter, has cut her leg attempting to transfer x 2 + required stitches. Has sons in [facility]. DIL is her POA.    Functional Level   Ambulation 4-->completely dependent   Transferring 1-->assistive equipment   Toileting 1-->assistive equipment   Bathing 3-->assistive equipment and person   Dressing 1-->assistive equipment   Eating 0-->independent   Cognition 0 - no cognition issues reported   Fall history within last six months yes   Number of times patient has fallen within last six months 1  (slid off scooter when putting sock on)   Prior Functional Level Comment Has an aid to come in and help her get in and out of the shower. Aid helps with showering back/legs but pt does majority of showering task. Pt typically able to dress herself using AE. Wears dresses when having hard days.    General Information   Onset of Illness/Injury or Date of Surgery - Date 07/28/20   Referring Physician Arleth Pace MD   Patient/Family Goals Statement To  return to [facility]   Additional Occupational Profile Info/Pertinent History of Current Problem Caryl Martin is a 71 yo woman with a history of IDDM c/b CKD stage IV, alcoholic cirrhosis with ascites, HE, and EV hospitalized at OSH with acute on chronic decompensated cirrhosis likely provoked by ESBL UTI transferred to ICU on 7/28/20 for worsening renal function, concern for possible need for continuous dialysis.    General Observations Very pleasant/talkative   General Info Comments Activity: up w/A   Cognitive Status Examination   Orientation orientation to person, place and time   Cognitive Comment Pt came in confused, able to provide PLOF/information about prior living situation. Will continue to monitor cognition. Does repeat the same stories at times.    Visual Perception   Visual Perception Wears glasses   Visual Perception Comments no acute changes noted   Sensory Examination   Sensory Comments pt reports tingling in B hands, started ~June. Has chronic neuropathy in B LEs which pt states has contributed to difficulty walking.    Integumentary/Edema   Integumentary/Edema Comments has lymphedema in B LEs, was working with  edema OT -- trying to transition into long term compression option prior to hospitalization.    Range of Motion (ROM)   ROM Comment Pt has significant arthritis/rotator cuff issues, demos shoulder flexion to ~80 degrees, compensating. Is L handed, has greater ROM with L UE.    Strength   Strength Comments < 3/5 in UE shoulder    Hand Strength   Hand Strength Comments B UE grasp is weak. Uses special jar openers at home.    Instrumental Activities of Daily Living (IADL)   Previous Responsibilities meal prep;medication management;finances   IADL Comments Pt uses a grocery delivery service. Enjoys cooking (making pasta specifically). Son has been helping with cleaning and laundry recently. Pt does not drive -- takes metro mobility.    General Therapy Interventions   Planned Therapy Interventions ADL  "retraining;IADL retraining;bed mobility training;cognition;strengthening;transfer training;home program guidelines;progressive activity/exercise   Clinical Impression   Criteria for Skilled Therapeutic Interventions Met yes, treatment indicated   OT Diagnosis Decreased ADL I.    Influenced by the following impairments weakness, decreased activity tolerance, cognition   Assessment of Occupational Performance 5 or more Performance Deficits   Identified Performance Deficits dressing, bathing, g/h, functional transfers, cooking   Clinical Decision Making (Complexity) Low complexity   Therapy Frequency 5x/week   Predicted Duration of Therapy Intervention (days/wks) 1 week   Anticipated Discharge Disposition Transitional Care Facility   Risks and Benefits of Treatment have been explained. Yes   Patient, Family & other staff in agreement with plan of care Yes   Clinical Impression Comments Pt to benefit from skilled OT to address above deficits. See daily flowsheet for details regarding tx provided.    [facility] TM \"6 Clicks\"   2016, Trustees of [facility], under license to [employer].  All rights reserved.   6 Clicks Short Forms Daily Activity Inpatient Short Form   [facility]  \"6 Clicks\" Daily Activity Inpatient Short Form   1. Putting on and taking off regular lower body clothing? 1 - Total   2. Bathing (including washing, rinsing, drying)? 2 - A Lot   3. Toileting, which includes using toilet, bedpan or urinal? 1 - Total   4. Putting on and taking off regular upper body clothing? 3 - A Little   5. Taking care of personal grooming such as brushing teeth? 3 - A Little   6. Eating meals? 3 - A Little   Daily Activity Raw Score (Score out of 24.Lower scores equate to lower levels of function) 13   Total Evaluation Time   Total Evaluation Time (Minutes) 20     "

## 2020-07-29 NOTE — CONSULTS
HEPATOLOGY CONSULTATION      Date of Admission:  7/28/2020          ASSESSMENT AND RECOMMENDATIONS:     70 year old female with a medical history as documented below, but pertinent for hypothyroidism, hypertension, diabetes, chronic kidney disease secondary to diabetic nephropathy as well as a complicated colovesicular fistula repair, CHF, chronic idiopathic demyelinating polyneuropathy [wheelchair-bound], and remote history of alcohol use disorder [10 years sober] with subsequent development of decompensated cirrhosis; admitted into the hospital for renal replacement therapy; and being seen by hepatology service for management of cirrhosis.     #. Worsening CKD:  This is the main reason for transfer to [facility].  She has a baseline of CKD secondary to diabetes nephropathy as well as a complicated colovesicular fistula repair.  She will be seen by the nephrology service for additional management.    #. Decompensated cirrhosis:  Decompensated disease, based on the presence of variceal hemorrhage status post EGD with AVL, ascites, and hepatic encephalopathy, with most recent decompensation likely due to recurrent infections with UTI and pneumonias..  Etiology: Alcohol use disorder, but likely contribution of nonalcoholic steatohepatitis given multiple risk factors [obesity, hypothyroidism, hypertension, and diabetes]  MELD-Na of  26, Child-Bernardo-Palmer score of 9 (Class B)   Hepatic encephalopathy: No evidence of hepatic encephalopathy on exam today.  On lactulose and rifaximin  Ascites: Noted on examination   SBP prophylaxis: None  TIPS: None  Esophageal/Gastric varices: Last EGD was done 7/27/2020 with esophageal varices status post 5 bands placed  Hepatocellular carcinoma: Ultrasound of the abdomen today pending  Transplant: Given her age, multiple co-morbidities (heart failure, severe mitral stenosis) as well as significant deconditioning with CIDP [wheelchair-bound], as patient is only able to stand and pivot;  she is not liver transplant candidate at this time.  Platelet count: 63  RECOMMENDATIONS:  -- Complete 72 hours of Octreotide - stop 7/30/2020  -- Treat with Ceftriaxone for GIB for 7 days  -- Monitor stool output - expect some bloody stools for now  -- Continue Rifaximin 550 mg BID  -- Lactulose PO, titrate to 3-4 BMs per day   -- Follow up on ultrasound with dopplers  -- Monitor transaminases, bilirubin, INR  --  PRN Therapeutic paracentesis for comfort - always send for cell count, gram stain and culture, protein and albumin, as well as cytology   Give 6-8g of albumin per liter if >5L is removed   -- Ensure sodium restriction to 2000 mg per day  -- Nephrology consult  -- Check urine sodium, FeNa  -- Palliative care  -- Adequate protein diet (1.2 - 1.5g/kg/day)   - Recommend multiple meals during the day, Snacks and shakes during the day/night   - Can use Ensure/Boost drinks TID        Thank you for involving us in this patient's care. Please do not hesitate to contact the GI service with any questions or concerns.     Patient care plan discussed with Dr. Wallace, GI staff physician.    Markel Junior MD  Transplant Hepatology Fellow  PGY 6 ([phone])            Chief Complaint:   We were asked by Dr. Pace of [facility] to evaluate this patient with cirrhosis    History is obtained from the patient and the medical record.          History of Present Illness:   Analy Martin is a 70 year old female with a medical history as documented below, but pertinent for hypothyroidism, hypertension, diabetes, chronic kidney disease secondary to diabetic nephropathy, CHF, chronic idiopathic demyelinating polyneuropathy [wheelchair-bound], and remote history of alcohol use disorder [10 years sober] with subsequent development of decompensated cirrhosis; admitted into the hospital for renal replacement therapy; and being seen by hepatology service for management of cirrhosis.  Patient states that she was diagnosed with  cirrhosis in 2010, and at that time was drinking about 4 alcoholic beverages per day.  She also had chronic kidney disease diagnosed that same year secondary to diabetes as well as a complicated colovesicular fistula repair.  She has been sober since 2010, and states that she has not had any trouble with her liver until 05/2020 when she was admitted into the hospital with anemia.  She had an EGD at that time that showed small varices.  She had recurrent admission in June for UTI, and had decompensated liver disease with ascites, worsening kidney disease, and anemia.  Two weeks ago, she was admitted to the hospital again with altered mental status, and further diagnosed with pneumonia and a UTI.  0n 7/27/2020, she developed maroon stools and underwent an upper endoscopy that showed large varices with evidence of bleeding [nipple /red jennifer sign] and this was treated by endoscopic variceal ligation.  On review of her chart, she has had increasing amounts of paracentesis, now at least once a week.  Her last paracentesis was 7/24/2020 with removal of 5200 cc.            Past Medical History:   HTN  Hypothyroidism  CKD IV 2/2 diabetic nephropathy  IDDM  Chronic decompensated cirrhosis 2/2 EtOH  Alcohol use disorder in remission (10 years)  Obesity  Chronic HFpEF  CIDP  Colovesicular fistula         Past Surgical History:   Splenectomy  Hysterectomy  Repair of colovesicular fistula         Previous Endoscopy:   EGD  7/27/2020  Impressions/Post-Op Diagnosis:       - Large (> 5 mm) esophageal varices. Completely eradicated. Banded.       - Red blood in the gastric body.       - Normal antrum.       - Normal first portion of the duodenum and second portion of the        duodenum.       - No specimens collected.       - In summary, large quantity of blood in the stomach with multiple        columns of large varices with red jennifer sign and one with nipple sign        indicated recent bleeding. There was no obvious gastric  varices but        limited exam due to large quantity of blood in stomach. Total of 5 bands        deployed at the sites with nipple or red jennifer signs.         Social History:   Lives independently with home nursing care but has been in and out of TCU and hospital over the past 10-11 months per daughter-in-law.   H/o heavy alcohol use, sober for 10 years.          Family History:   Reviewed and edited as appropriate  No known history of gastrointestinal/liver disease or  gastrointestinal malignancies       Allergies:   Reviewed and edited as appropriate   No Known Allergies         Medications:     Current Facility-Administered Medications:      cefTRIAXone (ROCEPHIN) 1 g vial to attach to  mL bag for ADULTS or NS 50 mL bag for PEDS, 1 g, Intravenous, Q24H, Arleth Pace MD, 1 g at 07/28/20 1558     glucose gel 15-30 g, 15-30 g, Oral, Q15 Min PRN **OR** dextrose 50 % injection 25-50 mL, 25-50 mL, Intravenous, Q15 Min PRN **OR** glucagon injection 1 mg, 1 mg, Subcutaneous, Q15 Min PRN, Arleth Pace MD     gabapentin (NEURONTIN) capsule 300 mg, 300 mg, Oral, At Bedtime, Arleth Pace MD, 300 mg at 07/28/20 2148     [COMPLETED] hydrocortisone sodium succinate PF (solu-CORTEF) injection 50 mg, 50 mg, Intravenous, Q12H, 50 mg at 07/29/20 0501 **FOLLOWED BY** hydrocortisone sodium succinate PF (solu-CORTEF) injection 25 mg, 25 mg, Intravenous, Q12H, Arleth Pace MD     insulin aspart (NovoLOG) injection (RAPID ACTING), 1-7 Units, Subcutaneous, TID AC, Arleth Pace MD, 2 Units at 07/28/20 1733     insulin aspart (NovoLOG) injection (RAPID ACTING), 1-5 Units, Subcutaneous, At Bedtime, Arleth Pace MD, 1 Units at 07/28/20 2148     insulin glargine (LANTUS PEN) injection 7 Units, 7 Units, Subcutaneous, At Bedtime, Mallory Mackey MD, 7 Units at 07/28/20 2148     levothyroxine (SYNTHROID/LEVOTHROID) tablet 75 mcg, 75 mcg, Oral, QAM SARAH, Arleth Pace  MD Eloisa     Lidocaine (LIDOCARE) 4 % Patch 1 patch, 1 patch, Transdermal, Q24H, Arleth Pace MD     lidocaine (LMX4) cream, , Topical, Q1H PRN, Mallory Mackey MD     lidocaine 1 % 0.1-1 mL, 0.1-1 mL, Other, Q1H PRN, Mallory Mackey MD     lidocaine patch in PLACE, , Transdermal, Q8H, Arleth Pace MD     melatonin tablet 5 mg, 5 mg, Oral, At Bedtime PRN, Estuardo Bartholomew MD, 5 mg at 07/28/20 2331     midodrine (PROAMATINE) tablet 10 mg, 10 mg, Oral, TID w/meals, Arleth Pace MD, 10 mg at 07/28/20 1758     naloxone (NARCAN) injection 0.1-0.4 mg, 0.1-0.4 mg, Intravenous, Q2 Min PRN, Arleth Pace MD     octreotide (sandoSTATIN) 1,250 mcg in sodium chloride 0.9 % 250 mL, 50 mcg/hr, Intravenous, Continuous, Arleth Pace MD, Last Rate: 10 mL/hr at 07/29/20 0700, 50 mcg/hr at 07/29/20 0700     ondansetron (ZOFRAN-ODT) ODT tab 4 mg, 4 mg, Oral, Q6H PRN **OR** ondansetron (ZOFRAN) injection 4 mg, 4 mg, Intravenous, Q6H PRN, Arleth Pace MD     pantoprazole (PROTONIX) 40 mg IV push injection, 40 mg, Intravenous, BID, Arleth Pace MD, 40 mg at 07/28/20 2141     [START ON 7/30/2020] predniSONE (DELTASONE) tablet 5 mg, 5 mg, Oral, Daily, Arleth Pace MD     sevelamer carbonate (RENVELA) tablet 1,600 mg, 1,600 mg, Oral, TID w/meals, Estuardo Bartholomew MD     sodium bicarbonate tablet 1,300 mg, 1,300 mg, Oral, BID, Estuardo Bartholomew MD     sodium chloride (PF) 0.9% PF flush 3 mL, 3 mL, Intracatheter, q1 min prn, Mallory Mackey MD     sodium chloride (PF) 0.9% PF flush 3 mL, 3 mL, Intracatheter, Q8H, Mallory Mackey MD         Review of Systems:     A complete review of systems was performed and is negative except as noted in the HPI           Physical Exam:   /51   Pulse 70   Temp 97.6  F (36.4  C) (Oral)   Resp 14   Ht 1.524 m (5')   Wt 94.2 kg (207 lb 10.8 oz)   SpO2 100%   BMI 40.56 kg/m    Wt:   Wt Readings  from Last 2 Encounters:   07/29/20 94.2 kg (207 lb 10.8 oz)      Constitutional: Obese, pleasant, not in respiratory or painful distress, afebrile  Eyes: Sclera anicteric/injected  Ears/nose/mouth/throat: Normal oropharynx without ulcers or exudate, mucus membranes moist, hearing intact  Neck: supple  CV: No edema  Respiratory: Unlabored breathing  Lymph: NAD  Abdomen: Soft, obese, mildly distended, +bs, no hepatosplenomegaly, nontender, no peritoneal signs  Skin: warm, perfused,    Neuro: AAO x 3, no clonus  Psych: Normal affect  MSK: In bed         Data:   Labs and imaging below were independently reviewed and interpreted  MELD-Na score: 26 at 7/29/2020  4:20 AM  MELD score: 26 at 7/29/2020  4:20 AM  Calculated from:  Serum Creatinine: 4.49 mg/dL (Rounded to 4 mg/dL) at 7/29/2020  4:20 AM  Serum Sodium: 137 mmol/L at 7/29/2020  4:20 AM  Total Bilirubin: 1.1 mg/dL at 7/29/2020  4:20 AM  INR(ratio): 1.66 at 7/29/2020  4:20 AM  Age: 70 years 4 months     BMP  Recent Labs   Lab 07/29/20  0420 07/28/20  1409    137   POTASSIUM 4.9 4.5   CHLORIDE 113* 112*   LUIS ALFREDO 8.5 8.6   CO2 18* 19*   BUN 70* 63*   CR 4.49* 4.61*   * 233*     CBC  Recent Labs   Lab 07/29/20 0420 07/28/20  1409   WBC 8.5  --  8.5   RBC 2.66*  --  2.73*   HGB 7.9*   < > 8.1*   HCT 25.8*  --  26.7*   MCV 97  --  98   MCH 29.7  --  29.7   MCHC 30.6*  --  30.3*   RDW 17.1*  --  17.2*   PLT 63*  --  58*    < > = values in this interval not displayed.     INR  Recent Labs   Lab 07/29/20  0420 07/28/20  1409   INR 1.66* 1.71*     LFTs  Recent Labs   Lab 07/29/20  0420 07/28/20  1409   ALKPHOS 80 80   AST 10 6   ALT 9 8   BILITOTAL 1.1 1.2   PROTTOTAL 5.4* 5.5*   ALBUMIN 2.5* 2.6*      PANCNo lab results found in last 7 days.    Imaging:  MRI brain  Jul 15 2020    FINDINGS:  The ventricles are normal in size configuration there are no areas of diffusion restriction to suggest acute infarction. There is no evidence of intracranial  hemorrhage.  There are multiple small foci of FLAIR and T2 signal hyperintensity within the deep supratentorial white matter consistent with mild chronic microvascular ischemia. Small chronic infarction left cerebellar hemisphere. The orbits sella and skullbase are unremarkable there is trace of mucosal thickening in the right mastoid.  IMPRESSION:  1. No significant interval changes compared to prior brain MRI of 02/12/2020. No new intracranial findings.   2. No evidence of acute ischemic infarction, intracranial hemorrhage or mass.  3. Mild chronic microvascular ischemic changes within cerebral white matter and small chronic cerebellar infarcts.  4. Partial right mastoid effusion.    ECHOCARDIOGRAM   7/20/2020    1. Technically limited exam.   2. Normal LV size, normal wall thickness, hyperdynamic global systolic function with an estimated EF of > 75%.   3. Right ventricular cavity size is normal, global systolic RV function is normal.   4. The aortic valve is not well visualized, moderate stenosis and trivial regurgitation.   5. The aortic valve peak velocity is 3.3 m/s, the peak gradient is 44 mmHg, and the mean gradient is 21 mmHg. The aortic valve area is 1.04 cmÂ  with a dimensionless index of 0.41.   6. The mitral valve is sclerotic, trace mitral regurgitation.   7. Mitral stenosis with severely increased mean gradient of 10.0 mmHg. HR 68 BPM.   8. Severely enlarged left atrium.   9. The inferior vena cava is dilated, respiratory size variation not well visualized.    ATTENDING NOTE, GASTROENTEROLOGY/HEPATOLOGY    I saw and discussed this patient with the fellow and participated in the decision making. I agree with the fellow's note. Phyllis Wallace MD

## 2020-07-29 NOTE — PLAN OF CARE
4C    PT: Orders received today, chart reviewed. Working multiple providers then evaluated by OT today. PT to initiate tomorrow.

## 2020-07-29 NOTE — PROGRESS NOTES
Resident/Fellow Attestation   I, Chava Azevedo, was present with the medical student who participated in the service and in the documentation of the note.  I have verified the history and personally performed the physical exam and medical decision making.  I agree with the assessment and plan of care as documented in the note.      This is a shared note, my edits are incorporated below.     Chava Azevedo MD  PGY1  Date of Service (when I saw the patient): 07/29/20    [facility], [place]    Progress Note - Maroon 2 Service        Date of Admission:  7/28/2020    Assessment & Plan   Caryl Martin is a 70 y.o. F with past medical history of HFpEF, CVA, aortic stenosis and mitral stenosis, HTN, alcoholic cirrhosis (c/b portal hypertensive gastropathy, ascites, encephalopathy, esophageal varices, and bone marrow aplasia), DM type II, CKD stage IV with chronic anemia, s/p splenectomy, morbid obesity, hypothyroidism, wheelchair-bound d/t CIDP, and depression who was admitted to [facility] MICU on 07/28/2020 after transfer from [facility] for possible CRRT in setting of acute renal failure. She has remained hemodynamically stable since arrival and transferred to floor on 07/29 with continued renal failure.     Her hospitalization at OSH was complicated by acute on chronic decompensated cirrhosis with bleeding esophageal varices (now s/p banding x5), septic shock, and progressive renal failure, likely provoked by ESBL UTI.     #Acute on chronic decompensated EtOH cirrhosis  #Delirium, resolved  #Hepatic encephalopathy, resolved  #Ascites  #Alcohol use disorder in remission  Patient was admitted to OSH on 07/15 with confusion and difficulty speaking; brain MR showed mild chronic microvascular ischemic changes within cerebral white matter and small chronic cerebellar infarcts with no acute findings. Ultimately confusion was thought to be 2/2 ESBL UTI for which she completed course of  ertapenem and Flagyl. At OSH, ammonia was slightly elevated to 40 and mental status improved with lactulose. On admission, ammonia WNL and lactulose discontinued. Patient has been able to recall details of her hospitalization with occasional difficulty but overall appears mentally very clear and able to converse easily. Currently A&Ox4. She is certainly at higher risk for delirium, however, so would maintain precautions. S/p large volume paracentesis (5L) on 07/19 and 07/24; per family, has had ~6 volodymyr over past few months. Abdominal US showing portal HTN, splenomegaly, and diffuse gallbladder wall thickening (possible adenomyomatosis or cholesterolosis). US of abdomen 7/29 without acute concerning findings - portal hypertension present. Completed courses of ertapenem and Flagyl on 07/23. No active infection. SBP ppx with ceftriaxone given bleeding varices as above.  - Delirium precautions  - PRN volodymyr for comfort (w/ albumin replacement if >5L)  - Na restriction to 2000 mg daily, 1.5 mg/kg/day protein (confirm with GI/nutrition)  - Continue rifaxamin/lactulose titrated to 3-4 stools/day    MELD-Na score: 26 at 7/29/2020  4:20 AM  MELD score: 26 at 7/29/2020  4:20 AM  Calculated from:  Serum Creatinine: 4.49 mg/dL (Rounded to 4 mg/dL) at 7/29/2020  4:20 AM  Serum Sodium: 137 mmol/L at 7/29/2020  4:20 AM  Total Bilirubin: 1.1 mg/dL at 7/29/2020  4:20 AM  INR(ratio): 1.66 at 7/29/2020  4:20 AM  Age: 70 years 4 months    #Hypovolemic shock, resolved  #Acute on chronic normocytic anemia   #Esophageal varices s/p banding x5  #Thrombocytopenia of chronic liver disease   At OSH, initially thought septic shock 2/2 UTI. Hgb dropped 8.4 --> 7.3 --> 6.4 at so received 3-4u pRBCs while there and another en route to [facility]. Hypovolemic shock precipitated by BRBPR and bleeding esophageal varices, which were banded x5 on 07/27. Required intermittent BP support with Levophed at OSH (weaned off 07/29 morning). Patient reportedly had  episode of hematemesis morning prior to transfer. On admission, had episode of melena on 07/28 night. Hgb has been stable while in ICU, 8.1 --> 8.1 --> 7.9. Consent for PRN transfusion obtained via phone with POA; PIV in place. Plts 58 on admission, consistent with recent baseline; improving slightly. Has not required pressors here; however, still with low UOP.  - GI Hepatology following, appreciate recs   - Continue octreotide gtt for at least 72 hours from  banding (stop 07/30)   - Continue ceftriaxone (7-day course) for GIB   - Continue IV pantoprazole BID   - Monitor stool output, expect some bloody stools for   now   - Trend plts   - Monitor Hgb BID, transfuse if <7  - Maalox once    #Metabolic acidosis  #Hepatorenal syndrome  #Oliguric MARCELO on CKD stage IV  Baseline Cr 2-3. At OSH, Cr 5.44 with minimal UOP on 07/28 and renal US negative for mass or hydronephrosis. MARCELO likely multifactorial from ATN (shock) and possibly HRS. Potassium WNL. UOP has been minimal.  - Nephrology consult appreciated   - No acute need for dialysis at this time (consent for CRRT obtained in case need arises)   - Continue HRS treatment with midodrine 10mg TID, octreotide  - Renal diet, avoid nephrotoxic agents  - Strict I/Os and daily wts, has Wellington  - Daily renal panel  - Restart PTA sodium bicarb 1300 mg BID  - Restart PTA sevelamer 1600 mg TID with meals     #HFpEF  PTA on torsemide. Echo at OSH from 07/20 showed EF >75%, sclerotic mitral valve with trace MR, and mitral stenosis. Elevated EF c/w high-output heart failure in setting of cirrhosis with ascites. Was continued on torsemide and started on spironolactone at OSH, but both were later held 2/2 hypotension requiring pressor support (weaned 07/28 morning).  - Hold PTA torsemide for now     #IDDM  PTA on insulin glargine 14u QHS. Well-controlled, A1c 5.3% on 07/15. At OSH, was on medium SSI and 7u glargine QHS.  - Continue medium dose SSI  - Continue 7u glargine QHS  -  Hypoglycemia protocol      #Hypothyroidism   PTA on levothyroxine. TSH 16.58, free T4 0.78 @ OSH.   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 At OSH, received hydrocortisone 50mg Q6H for stress dosing in setting of hypotension. Have been weaning stress dosing as patient has been normotensive. Completed 50 mg hydrocortisone BID on 07/28. No RLS symptoms reported since admission.  - Decrease pulse steroid dose to 25mg BID today  -  Hold home dose of 5 mg prednisone daily (start 07/30)  - Continue PTA gabapentin 300 mg QHS  - Hold pramipexole for now to avoid polypharmacy    #Bone mineral disorder  #Osteoarthritis  PTA on sevelamer carbonate for BMD. OA longstanding, primarily in shoulders and right knee. Patient does not want APAP. Avoid opioids. Regarding BMD, Ca and P at goal.   - Continue sevelamer  - Topical lidocaine for pain management     #Gout  - Hold PTA allopurinol      Diet: CLD (no red/purple liquids)  Fluids: None  Lines: RIJ, Wellington catheter, L PIV  DVT Prophylaxis: Pneumatic Compression Devices  Wellington Catheter: in place, indication: Strict 1-2 Hour I&O  Code Status: DNR/DNI       Disposition Plan   Expected discharge: 4 - 7 days, recommended to prior living arrangement once renal function improved.  Entered: Edilma Roque 07/29/2020, 5:49 PM     The patient's care was discussed with the Primary team.    Edilma Roque, MS4  Medicine - [place] 2  Pager [phone]  ______________________________________________________________________    Interval History   At 1700, patient endorsed nausea, dizziness/lightheadedness, and fluctuating subjective fever/chills after trying to drink some broth. Also began to endorse central non-reproducible chest pain, which she has never experienced before. EKG normal and vitals reassuring with /42 and HR 72. Symptoms did not improve with Zofran, Maalox was given.    Data reviewed  today: I reviewed all medications, new labs and imaging results over the last 24 hours. I personally reviewed new labs and abdominal US showing diffuse gallbladder wall thickening, portal HTN, ascites, and  splenomegaly.    Physical Exam   Vital Signs: Temp: 97.5  F (36.4  C) Temp src: Oral BP: 130/42 Pulse: 70 Heart Rate: 72 Resp: 18 SpO2: 95 % O2 Device: None (Room air) Oxygen Delivery: 2 LPM  Weight: 207 lbs 10.77 oz  General: Alert and oriented to place and date, uncomfortable  HEENT: Oral mucosa dry, bilateral submandibular swelling, PERRLA, EOM intact  CV: Regular, regular rate, normal S1S2, 3/6 systolic murmur, no clicks or rubs  Resp: Clear to anterior auscultation bilaterally, no wheezes, rhonchi  Abd: Soft, non-typamnitic, non-tender to palpation, distended without fluid wave, BS+, no masses appreciated  : matthews present  Extremities: Radial and pedal pulses intact and symmetric, 1+ pedal edema  Skin: Well-perfused, chronic venous stasis changes to BLEs  Neuro: No lateralizing symptoms or focal neurologic deficits, difficult to assess for asterixis on left, none on right     Data   Recent Labs   Lab 07/29/20  0420 07/28/20  2343 07/28/20  1409   WBC 8.5  --  8.5   HGB 7.9* 8.1* 8.1*   MCV 97  --  98   PLT 63*  --  58*   INR 1.66*  --  1.71*     --  137   POTASSIUM 4.9  --  4.5   CHLORIDE 113*  --  112*   CO2 18*  --  19*   BUN 70*  --  63*   CR 4.49*  --  4.61*   ANIONGAP 6  --  6   LUIS ALFREDO 8.5  --  8.6   *  --  233*   ALBUMIN 2.5*  --  2.6*   PROTTOTAL 5.4*  --  5.5*   BILITOTAL 1.1  --  1.2   ALKPHOS 80  --  80   ALT 9  --  8   AST 10  --  6     Recent Results (from the past 24 hour(s))   US Abd Complete w Abd/Pel Duplex Complete Port    Narrative    EXAMINATION: US ABDOMEN COMPLETE WITH DOPPLER COMPLETE PORTABLE  7/29/2020 8:58 AM     COMPARISON: Outside hospital report CT abdomen and pelvis 8/16/2019.    HISTORY: Decompensated cirrhosis, ascites, esophageal varices,  hepatorenal  syndrome.    TECHNIQUE: The abdomen was scanned in standard fashion with  specialized ultrasound transducer(s) using both gray-scale, color  Doppler, and spectral flow techniques.    Findings:  Liver: The liver demonstrates coarse heterogenous echotexture and  increased parenchymal echogenicity. Nodular liver contours. No  evidence of a focal hepatic mass.     Extrahepatic portal vein flow is antegrade at 10 cm/s.  Right portal vein flow is antegrade, measuring 6 cm/s.  Left portal vein flow is antegrade, measuring 11 cm/s.    Flow in the hepatic artery is biphasic:  Peak systolic velocity 55 cm/s.  Unable to calculate resistive index.    Recannulized paraumbilical vein with a velocity of 15 cm/s.    The splenic vein is patent and flow is towards the liver.  The left,  middle, and right hepatic veins are patent with flow towards the IVC.  The IVC is patent with flow towards the heart.   The aorta is patent  with normal caliber.    Gallbladder: Diffuse thickening of the gallbladder wall which may be  due to adenomyomatosis versus third spacing of fluid/cirrhosis.  Echogenic foci with reverberation artifact suggests cholesterol  deposits. Multiple nonobstructing gallstones present with biliary  sludge.    Bile Ducts: The common bile duct was not visualized.    Pancreas: Visualized portions of the head and body of the pancreas are  unremarkable.     Kidneys:  The left kidney is of normal echotexture without mass or  hydronephrosis and appears mildly atrophic. The right kidney is not  well visualized but appears atrophic consistent with medical renal  disease. Renal lengths: right- 8.4 cm, left- 8.8 cm.    Spleen: The spleen is enlarged measuring 20.7 cm in length.    Fluid: There is moderate ascites present.      Impression    Impression:   1.  Cirrhotic liver morphology with evidence of portal hypertension.  Reduced portal vein velocities consistent with portal venous  hypertension.  2.  Splenomegaly.  3.   Ascites.  4.  Diffuse gallbladder wall thickening which may be due to  adenomyomatosis versus underlying liver disease. Cholesterolosis  likely present.    I have personally reviewed the examination and initial interpretation  and I agree with the findings.    LISA FREDERICK MD

## 2020-07-29 NOTE — PLAN OF CARE
Problem: Electrolyte Imbalance (Acute Kidney Injury/Impairment)  Goal: Serum Electrolyte Balance  Outcome: No Change    ICU End of Shift Summary. See flowsheets for vital signs and detailed assessment.    Changes this shift: Alert and oriented, forgetful at times. BP soft, MAP 60-70. 2L of oxygen via nasal cannula, SpO2 maintained > 92%. On a clear liquid diet until midnight, then NPO. 1 moderate sized bowel movement, black/maroon in color. Incontinent. Wellington in place, averaging ~10 mL/hr. Cr 4.49, K 4.9. Assisted pt to video chat with [facility] and [facility], updated them as well.     Plan: Abdominal ultrasound this morning. Monitor hemodynamics and for signs of active bleeding. Update team with any changes.     Edilma De Souza RN on 7/29/2020 at 6:40 AM

## 2020-07-29 NOTE — PROGRESS NOTES
[facility] MICU Transfer Out Note    Analy Martin MRN# [id number]   Age: 70 year old YOB: 1950     Date of Admission: 7/28/2020           Assessment and Plan:   Assessment:  Caryl Martin is a 71 yo woman with a history of IDDM c/b CKD stage IV, alcoholic cirrhosis with ascites, HE, and EV hospitalized at OSH with acute on chronic decompensated cirrhosis likely provoked by ESBL UTI transferred to ICU on 7/28/20 for worsening renal function, concern for possible need for continuous dialysis.     Has been stable since admission to the ICU, does not require ICU level care, no urgent need for dialysis.     NEURO:  Delirium thought 2/2 UTI, resolved  Hepatic encephalopathy, mild  Admitted to OSH with confusion, difficulty speaking. Brain MR showed mild chronic microvascular ischemic changes within cerebral white matter and small chronic cerebellar infarcts, no acute findings. Ultimately confusion thought 2/2 ESBL UTI for which she completed course of ertapenem and Flagyl (last dose 7/23). Ammonia slightly elevated at OSH to 40, and it was noted her mental status improved with lactulose, which has not been continued in the ICU. Ammonia 35 on 7/28. Pt has been able to recall details of her hospitalization with occasional difficulty but overall appears mentally very clear and able to converse easily. A&Ox4. She is certainly at higher risk for delirium, however, so would maintain precautions.    - Delirium precautions    CIDP on chronic steroids  Home dose 5mg prednisone. Received 50mg q6h hydrocortisone for stress dosing in setting of hypotension at OSH. Here we have been weaning her stress dosing as she has been normotensive. Completed 50 mg hydrocortisone BID on 7/28.   -  Step down to 25mg q12 today  - Would recommend then resuming home dose of 5 mg prednisone daily *start 7/30)    Neuropathy  - Continue PTA gabapentin 300 mg at bedtime    RLS  PTA pramipexole. Has not reported symptoms since  admission.   - Hold pramipexole for now to avoid polypharmacy      PULMONARY:  H/o hypoxia, resolved  Short of breath at OSH requiring supplemental O2 by NC to keep sats >92%. Thought 2/2 decompensated cirrhosis with ascites, anxiety. There was also concern for aspiration pneumonia at OSH presumed treated by ertapenem and Flagyl prescribed for UTI. O2 sats in upper 90s on admission. Did require NC @2L overnight 7/28 but O2 sats in upper 90s on RA in the AM.   - Continue to monitor respiratory status     CV:  Recent history of septic shock and hypovolemic shock 2/2 acute GI blood loss, resolved   Initially thought septic shock 2/2 UTI, then hypovolemic in setting of large volume blood loss with bleeding esophageal varices. Required intermittent pressure support with Levophed at OSH, weaned off morning prior to transfer. Has not required pressure support since ICU admission.   - Continue to monitor     HFpEF  Echo at OSH from 7/20/20 with EF >75%, sclerotic mitral valve with trace MR, mitral stenosis. Elevated EF c/w high-output heart failure in setting of cirrhosis with ascites. Prior to admission at OSH was taking torsemide and spironolactone was started during admission, but both were later held 2/2 hypotension requiring pressor support.    - Continue to hold PTA torsemide for now--she is normotensive here but just weaned off pressors 7/28 AM      RENAL:  Oliguric MARCELO on CKD IV 2/2 diabetic nephropathy  Metabolic acidosis  Baseline Cr 2-3, Cr 5.44 at OSH this morning with minimal UOP.   Renal US at OSH negative for mass or hydronephrosis. MARCELO likely multifactorial from ATN (shock) and possibly HRS. K 4.5.  - No acute need for dialysis at this time  - nephrology consult, appreciate recommendations - no acute need for dialysis, consent for RRT obtained in case need arises  - continue HRS treatment with midodrine 10mg TID, octreotide  - strict I/Os, has Wellington  - Renal diet, avoid nephrotoxic agents  - Daily renal  panel, daily weights  - Restart PTA sodium bicarb 1300 mg BID  - Restart PTA sevelamer 1600 mg TID with meals       ID:  H/o ESBL UTI, resolved  Completed course of ertapenem and Flagyl on 7/23.     No active infection. SBP ppx with CTX given bleeding varices as below.      GI:  Acute on chronic decompensated EtOH cirrhosis with HE, EVs, ascites  Alcohol use disorder in remission  BRBPR on 7/27. Large EVs banded on 7/27 (5 bands) at [facility]. Episode of hematemesis morning of transfer, one episode of melena 7/28 night. Large volume paracentesis (5L) 7/19 and 7/24; per family about 6 volodymyr over past few months.  - Continue octreotide gtt for at least 72 hours from banding - stop 7/30 per GI  - Abx ppx as above for 7 days  - IV pantoprazole BID  Also per GI (not yet ordered):  --Continue rifamixin (however does not appear she was taking this PTA)  --Monitor stool output, expect some bloody stools for now  --abdominal US with dopplers pending   --Start lactulose PO titrated to 3-4 stools per day  --prn volodymyr for comfort   --NA restriction to 2000 mg daily  --Adequate protein diet      MELD-Na score: 26 at 7/29/2020  4:20 AM  MELD score: 26 at 7/29/2020  4:20 AM  Calculated from:  Serum Creatinine: 4.49 mg/dL (Rounded to 4 mg/dL) at 7/29/2020  4:20 AM  Serum Sodium: 137 mmol/L at 7/29/2020  4:20 AM  Total Bilirubin: 1.1 mg/dL at 7/29/2020  4:20 AM  INR(ratio): 1.66 at 7/29/2020  4:20 AM  Age: 70 years 4 months      ENDO:  IDDM  Well-controlled, A1c 5.3 on 7/15/20. On glargine 14u HS. Has been on medium sliding scale insulin and 7 units glargine at bedtime at OSH.   - Continue medium dose sliding scale insulin  - Continue 7 Units glargine at bedtime  - Hypoglycemia protocol     Hypothyroidism   TSH 16.58, free T4 0.78 @ OSH.   - Continue PTA 75 mcg levothyroxine       HEME/ONC:  Acute on chronic normocytic anemia 2/2 UGIB  Hgb dropped 8.4>7.3>6.4 at OSH, received 3-4 units PRBCs while there and another en route. Large  esophageal varices banded 7/27. Pt reportedly coughing up blood morning prior to transfer. Had episode of melena 7/28 night. Hgb has been stable while in ICU, 8.1>8.1>7.9. Type and screen obtained, consent for blood transfusion obtained via phone with POA. PIV in place.   - Monitor for signs/symptoms of bleeding   - Transfuse <7  - Hgb checks q12h    Thrombocytopenia of chronic liver disease  Platlets 58 on admission, consistent with her recent baseline. Increased slightly to 63 today.   - Trend      RHEUM/MSK:  Osteoarthritis  Longstanding, primarily in shoulders and right knee. Pt does not want APAP. Avoid opioids.  - Topical lidocaine for pain management    Gout  - Hold PTA allopurinol       FEN: clear liquids, no reds  PPx: pantoprazole as above  LINES: RIJ, Wellington catheter   CODE STATUS: DNR/DNI  DISPOSITION: floor    Patient seen and discussed with attending physician, Dr. Narendra Ovalle MD  Internal Medicine, PGY-1  [phone]      SUBJECTIVE:  One episode of melena overnight. On 2L O2 by NC overnight. Denies abdominal pain this morning.         OBJECTIVE   Vitals were reviewed in Epic    General: awake and alert, conversant, pleasant, no acute distress  HEENT: Oral mucosa somewhat dry, bilateral submandibular swelling present  CV: Regular, regular rate, normal S1S2, 3/6 blowing systolic murmur, no clicks or rubs  Resp: Clear to anterior auscultation bilaterally, no wheezes, rhonchi  Abd: Soft, distended with fluid wave, BS+, no masses appreciated  Extremities: Radial and pedal pulses intact and symmetric, 1+ pedal edema unchanged from previous exam   Neuro: A&Ox4    Labs and imaging personally reviewed, pertinent results included in A/P above.

## 2020-07-30 NOTE — PLAN OF CARE
Discharge Planner PT   Patient plan for discharge: agreeable to TCU  Current status: PT evaluation completed. Pt transfers sit>stand with min A x 2, assist at chux pads under hips for hip extension, pt pulling up on bed railing. Tolerates standing 5-10 seconds x 3 stands. Continue to recommend lift transfer for bed<>chair at this itme.   Barriers to return to prior living situation: medical status, weakness, decreased activity tolerance  Recommendations for discharge: TCU  Rationale for recommendations: improve strength and independence with functional transfers.        Entered by: Lilly Pinto 07/30/2020 10:00 AM

## 2020-07-30 NOTE — PROGRESS NOTES
Brief Nephrology Progress Note:    Patient continues to improve clinically, making urine and metabolic panel stable with decreasing Cr trend. Do not anticipate need for RRT.     Nephrology will sign off, please page with questions.     Discussed with Dr Wilson.    Malcolm Bautista MD  Renal Fellow  [phone]

## 2020-07-30 NOTE — PLAN OF CARE
ICU End of Shift Summary. See flowsheets for vital signs and detailed assessment.    Changes this shift: Alert and oriented. Up in chair with lift. SB on monitor, HR 44-60's. MAP > 65. BMx2 dark maroon. Diet advanced from clear liquid to low sodium. Urine output 20-25 ml/hr. Right internal jugular removed.     Plan: Transfer to floor when bed available. Possible paracentesis for comfort by IR tomorrow.

## 2020-07-30 NOTE — PROGRESS NOTES
07/30/20 0904   Quick Adds   Type of Visit Initial PT Evaluation   Living Environment   Lives With other (see comments)   Living Arrangements independent living facility   Home Accessibility wheelchair accessible   Living Environment Comment Pt was at [facility] until March [facility]st, at which point she returned home. Pt lives in [facility], is on 1st floor. Pt has good amount of grab bars by the shower and toilet. Has a shower chair, non slip strips in shower, has long handled sponge. Has lift recliner. Has an extended toilet, plans to get a riser   Self-Care   Usual Activity Tolerance fair   Current Activity Tolerance poor   Equipment Currently Used at Home dressing device;grab bar, toilet;grab bar, tub/shower;hospital bed;raised toilet;shower chair;other (see comments)   Activity/Exercise/Self-Care Comment Was an LPN for 42 years. Has been primarily in w/c since 2012 -- scooter w/joystick. Has had issues w/ scooter, has cut her leg attempting to transfer x 2 + required stitches. Has sons in [facility]. DIL is her POA.    Functional Level Prior   Ambulation 4-->completely dependent   Transferring 1-->assistive equipment   Toileting 1-->assistive equipment   Bathing 3-->assistive equipment and person   Communication 0-->understands/communicates without difficulty   Swallowing 0-->swallows foods/liquids without difficulty   Cognition 0 - no cognition issues reported   Fall history within last six months yes   Number of times patient has fallen within last six months 1   Which of the above functional risks had a recent onset or change? transferring   Prior Functional Level Comment Pt states she prefers to pull up on railings to stand. Has an aid to come in and help her get in and out of the shower. Aid helps with showering back/legs but pt does majority of showering task. Pt typically able to dress herself using AE. Wears dresses when having hard days.    General Information   Onset of Illness/Injury or Date of Surgery  - Date 07/29/20   Referring Physician Arleth Pace MD    Patient/Family Goals Statement Get my strength back   Pertinent History of Current Problem (include personal factors and/or comorbidities that impact the POC) Pt is a 70 y.o. F with past medical history of HFpEF, CVA, aortic stenosis and mitral stenosis, HTN, alcoholic cirrhosis (c/b portal hypertensive gastropathy, ascites, encephalopathy, esophageal varices, and bone marrow aplasia), DM type II, CKD stage IV with chronic anemia, s/p splenectomy, morbid obesity, hypothyroidism, wheelchair-bound d/t CIDP, and depression who was admitted to [facility] MICU on 07/28/2020 after transfer from [facility] for possible CRRT in setting of acute renal failure. She has remained hemodynamically stable since arrival and transferred to floor on 07/29 with continued renal failure.    Precautions/Limitations fall precautions   General Observations Pt up in chair via universal sling/ceiling lift.    General Info Comments Activity: Up with assist   Cognitive Status Examination   Orientation orientation to person, place and time   Level of Consciousness alert   Follows Commands and Answers Questions 100% of the time;able to follow multistep instructions   Personal Safety and Judgment intact   Integumentary/Edema   Integumentary/Edema Comments Minimal chronic LE edema, brown/reddish discoloration L>R. LLE also cooler to the touch on shin. Thickened toenails. Healing R scar on R shin from fall. Blistering/fibrosis skin changes on L foot.    Posture    Posture Forward head position;Protracted shoulders  (B shoulder elevation 2/2 chronic shoulder arthritis )   Range of Motion (ROM)   ROM Comment Tightnes in B heelcords, lacking ~5 degrees from neutral from pf position.    Strength   Strength Comments Minimal shoulder flexion B 2/2 arthritis and history of rotator cuff injuries. LE strength: R ankle df 1+/5, L ankle df 1/5, B knee extension >3/5, hip flexion  "<3-/5   Transfer Skills   Transfer Comments Sit>stand with min A x 2, pt pulling on bed railing, assist at chux pad under hips. Crepitis in B knees.    Gait   Gait Comments Unable to ambulate, needing min A x 2 to stand this date, unable to complete marching in place.    Balance   Balance Comments Pt stands with min A x 2, BUE support on railing. Good sitting balnace.    General Therapy Interventions   Planned Therapy Interventions transfer training;stretching;strengthening;bed mobility training;gait training   Clinical Impression   Criteria for Skilled Therapeutic Intervention yes, treatment indicated   PT Diagnosis impaired functional mobility   Influenced by the following impairments LE weakness, impaired balnace, decreased activity tolerance, decreased ROM   Functional limitations due to impairments Difficulty wiht bed mobility and transfers   Clinical Presentation Stable/Uncomplicated   Clinical Presentation Rationale medical status, clinical judgement   Clinical Decision Making (Complexity) Low complexity   Therapy Frequency 5x/week   Predicted Duration of Therapy Intervention (days/wks) 1 week   Anticipated Discharge Disposition Transitional Care Facility   Risk & Benefits of therapy have been explained Yes   Patient, Family & other staff in agreement with plan of care Yes   [facility] [employer] TM \"6 Clicks\"   2016, Trustees of [facility], under license to [employer].  All rights reserved.   6 Clicks Short Forms Daily Activity Inpatient Short Form   [facility] [employer]  \"6 Clicks\" Daily Activity Inpatient Short Form   1. Putting on and taking off regular lower body clothing? 1 - Total   2. Bathing (including washing, rinsing, drying)? 2 - A Lot   3. Toileting, which includes using toilet, bedpan or urinal? 1 - Total   4. Putting on and taking off regular upper body clothing? 3 - A Little   5. Taking care of personal grooming such as brushing teeth? 3 - A Little   6. Eating meals? 3 - A " Little   Daily Activity Raw Score (Score out of 24.Lower scores equate to lower levels of function) 13   Total Evaluation Time   Total Evaluation Time (Minutes) 8

## 2020-07-30 NOTE — PLAN OF CARE
Major Shift Events:  Pt AOx4, forgetful at times but able to reorient. VSS, checked q4. UOP remains ~15ml/hr. One small BM overnight.  Plan: Transfer to floor when bed available.  For vital signs and complete assessments, please see documentation flowsheets.

## 2020-07-30 NOTE — PLAN OF CARE
Discharge Planner OT   Patient plan for discharge: TCU  Current status: Pt up in chair when approached, facilitated seated ADLs. Limited by reduced B UE shoulder ROM. Discussed discharge plans. VSS on RA.   Barriers to return to prior living situation: Medical status, weakness, decreased I with transfers, decreased I with ADLs.   Recommendations for discharge: TCU  Rationale for recommendations: Pt significantly below functional baseline and unable to safely return to ILF at this time.        Entered by: Tamera Reed 07/30/2020 12:49 PM     OT 4C

## 2020-07-30 NOTE — PROGRESS NOTES
[facility]    Hepatology Follow-up    CC: Need for renal replacement therapy    Dx: MARCELO on CKD   Decompensated cirrhosis with possible variceal hemorrhage and ascites    History of alcohol use disorder, now 10 years sober   Hypertension/hyperlipidemia/diabetes   CIDP, wheelchair-bound    24 hour events:   No new issues, afebrile    Subjective:  Denies any abdominal pain, nausea or vomiting  No evidence of active GI blood loss  Tolerating a diet, moved to the chair today from the bed.  Patient denies fevers, sweats or chills.    Medications  Current Facility-Administered Medications   Medication Dose Route Frequency     cefTRIAXone  1 g Intravenous Q24H     gabapentin  300 mg Oral At Bedtime     insulin aspart  1-7 Units Subcutaneous TID AC     insulin aspart  1-5 Units Subcutaneous At Bedtime     insulin glargine  14 Units Subcutaneous At Bedtime     ipratropium  2 spray Nasal Daily     levothyroxine  75 mcg Oral QAM AC     lidocaine  1 patch Transdermal Q24H     lidocaine   Transdermal Q8H     midodrine  10 mg Oral TID w/meals     oxybutynin ER  15 mg Oral Daily     pantoprazole  40 mg Oral BID AC     pramipexole  0.125 mg Oral At Bedtime     [START ON 7/31/2020] predniSONE  5 mg Oral Daily     rifaximin  550 mg Oral BID     sevelamer carbonate  1,600 mg Oral TID w/meals     sodium bicarbonate  1,300 mg Oral BID     sodium chloride (PF)  3 mL Intracatheter Q8H     tamsulosin  0.4 mg Oral Daily     vitamin D3  50 mcg Oral Daily       Review of systems  A 10-point review of systems was negative, unless stated above    Examination  BP 99/56 (BP Location: Left arm)   Pulse 70   Temp 97.7  F (36.5  C) (Oral)   Resp 24   Ht 1.524 m (5')   Wt 94.2 kg (207 lb 10.8 oz)   SpO2 97%   BMI 40.56 kg/m      Intake/Output Summary (Last 24 hours) at 7/30/2020 1342  Last data filed at 7/30/2020 1200  Gross per 24 hour   Intake 1070 ml   Output 575 ml   Net 495 ml       General: Obese, pleasant, not  in distress  CVS: RRR  Resp: CTA  Abdomen: Full abdomen, obese, mildly distended, not tender  Extremities:  Neuro: AAO X 3, asterixis absent    Laboratory  Lab Results   Component Value Date     07/30/2020    POTASSIUM 4.5 07/30/2020    CHLORIDE 112 07/30/2020    CO2 18 07/30/2020    BUN 76 07/30/2020    CR 4.09 07/30/2020     Lab Results   Component Value Date    BILITOTAL 1.1 07/30/2020    ALT 9 07/30/2020    AST 8 07/30/2020    ALKPHOS 87 07/30/2020     Lab Results   Component Value Date    WBC 10.4 07/30/2020    HGB 8.4 07/30/2020    MCV 97 07/30/2020    PLT 72 07/30/2020     Lab Results   Component Value Date    INR 1.67 07/30/2020     MELD-Na score: 26 at 7/30/2020  4:23 AM  MELD score: 26 at 7/30/2020  4:23 AM  Calculated from:  Serum Creatinine: 4.09 mg/dL (Rounded to 4 mg/dL) at 7/30/2020  4:23 AM  Serum Sodium: 137 mmol/L at 7/30/2020  4:23 AM  Total Bilirubin: 1.1 mg/dL at 7/30/2020  4:23 AM  INR(ratio): 1.67 at 7/30/2020  4:23 AM  Age: 70 years 4 months        Assessment  70 year old female with a complicated medical history, but pertinent for remote alcohol use and decompensated cirrhosis mainly due to ascites, as well as variceal bleed status post band ligation on 7/27/2020, complicated by worsening MARCELO on CKD and transferred to [facility] for renal replacement therapy.  Stable from a hepatology standpoint, without any encephalopathy, active GI bleed, or infection.  Of note, she is not liver transplant candidate given age, multiple comorbidities, severe deconditioning, and inability to ambulate.    Recommendations  -- Complete 72 hours of octreotide today  -- Complete 7 days of antibiotics for GI bleed  -- Continue rifaximin and lactulose  -- PRN therapeutic paracentesis for comfort [do diagnostic labs all times]  -- 2 g sodium restriction  -- Adequate protein diet  -- Nephrology following for renal replacement therapy consideration.    Hepatology service will follow peripherally.    Patient seen and  discussed with attending physician, Dr. Xu Junior MD  PGY 6  Transplant Hepatology Fellow  [phone]  Attestation:  This patient has been seen and evaluated by me, Lizbet Mcnair.  Discussed with the house staff team or resident(s) and agree with the findings and plan in this note.

## 2020-07-31 NOTE — PROGRESS NOTES
Social Work Services Progress Note    Hospital Day: 3  Date of Initial Social Work Evaluation:  7/31/20  Collaborated with:  Massiel Benito Intern Edilma, pt's daughter-in-law Iraida, Jemima Cisneros    Data:     Caryl Martin is a 70 y.o. F with past medical history of HFpEF, CVA, aortic stenosis and mitral stenosis, HTN, alcoholic cirrhosis (c/b portal hypertensive gastropathy, ascites, encephalopathy, esophageal varices, and bone marrow aplasia), DM type II, CKD stage IV with chronic anemia, morbid obesity, hypothyroidism, wheelchair-bound d/t CIDP, and depression who was admitted to [facility] MICU on 07/28/2020 after transfer from [facility] for possible CRRT in setting of acute renal failure.     Intervention:  SW received a call from Massiel Benito intern requesting SW contact pt's daughter-in-law Iraida, who had some questions about patient's independent living apartment. SW called Iraida and left a voicemail introducing self and leaving call back number.     SW received advanced directive from [employer] medical records. Submitted to [employer] for validation. [employer] reached out to SW to note that the directive was invalid; please see ACP tab for details.    Assessment:  Pt remains critically ill in ICU    Plan:    Anticipated Disposition:  Facility:  TCU vs home with home care    Barriers to d/c plan:  Medical readiness, bed availability    Follow Up:  SW will continue to remain available for patient and family support, discharge planning, other resources and support PRN.    ALANNAH Cook, [facility]  ICU    SADIE [facility]   P: [phone]  Pager: [phone]

## 2020-07-31 NOTE — PROGRESS NOTES
Gastroenterology Inpatient Sign Off Note    Inpatient GI consults service will sign off. No further recommendations at this time. If primary team has addition questions, please page consult fellow listed in Amion.    Current GI Consult Staff: Dr. Mcnair     Follow up recommendations:   Patient will like to follow up with her Gi provider at [facility] Zafar Junior MD  GI fellow

## 2020-07-31 NOTE — PROCEDURES
[facility], [place]    Procedure: IR Procedure Note    Date/Time: 7/31/2020 4:24 PM  Performed by: Rob Aleman PA-C  Authorized by: Rob Aleman PA-C   IR Fellow Physician:  Other(s) attending procedure: Assist: KAIN Gordon    UNIVERSAL PROTOCOL   Site Marked: NA  Prior Images Obtained and Reviewed:  Yes  Required items: Required blood products, implants, devices and special equipment available    Patient identity confirmed:  Verbally with patient, arm band, provided demographic data and hospital-assigned identification number  Patient was reevaluated immediately before administering moderate or deep sedation or anesthesia  Confirmation Checklist:  Patient's identity using two indicators, relevant allergies, procedure was appropriate and matched the consent or emergent situation and correct equipment/implants were available  Time out: Immediately prior to the procedure a time out was called    Universal Protocol: the Joint Commission Universal Protocol was followed    Preparation: Patient was prepped and draped in usual sterile fashion    ESBL (mL):  1         ANESTHESIA    Anesthesia: Local infiltration  Local Anesthetic:  Lidocaine 1% without epinephrine  Anesthetic Total (mL):  10      SEDATION    Patient Sedated: No    See dictated procedure note for full details.  Findings: U/S guided diagnostic and therapeutic paracentesis performed at [facility]. Return of hazy milan fluid, 120 ml of which was sent for labs as ordered.    Specimens: fluid and/or tissue for laboratory analysis    Complications: None    Condition: Stable    Plan: Follow up per primary team.    PROCEDURE   Patient Tolerance:  Patient tolerated the procedure well with no immediate complications    Length of time physician/provider present for 1:1 monitoring during sedation: 0

## 2020-07-31 NOTE — CONSULTS
INTERVENTIONAL RADIOLOGY CONSULT NOTE    Patient is a 70 year old female with history of heart disease, DM type II, CKD stage IV with chronic anemia, s/p splenectomy, morbid obesity, hypothyroidism, wheelchair-bound d/t CIDP, and depression, and alcoholic cirrhosis with ascites. US shows moderate ascites. Request for 3 L limit- requesting drainage for patient comfort.    Patient is on IR schedule Friday 7/31/20 for a diagnostic and therapeutic paracentesis.   Labs WNL for procedure.      No NPO required.  Medications to be held include: None  Consent will be done prior to procedure.     Platelet Count   Date Value Ref Range Status   07/31/2020 59 (L) 150 - 450 10e9/L Final     INR   Date Value Ref Range Status   07/31/2020 1.57 (H) 0.86 - 1.14 Final        Please contact the IR charge RN at [phone] for estimated time of procedure.     Case discussed with Dr. Espinoza [id number].    Kelly Cole PA-C  Interventional Radiology

## 2020-07-31 NOTE — PLAN OF CARE
4C: PT   Discharge Planner PT   Patient plan for discharge: TCU  Current status: Patient performed MOVEO at 20 degrees but limited by arthritis pain. Limited by low BP and low blood glucose.   Barriers to return to prior living situation: Mobility and medical complexity  Recommendations for discharge: TCU  Rationale for recommendations: Patient has limited ability to pivot to her wheelchair and requires skilled PT  to improve LE strength       Entered by: Lina Aguilar 07/31/2020 10:57 AM

## 2020-07-31 NOTE — PROGRESS NOTES
[facility], [place]    Progress Note - [place] 2 Service        Date of Admission:  7/28/2020    Assessment & Plan   Caryl Martin is a 70 y.o. F with past medical history of HFpEF, CVA, aortic stenosis and mitral stenosis, HTN, alcoholic cirrhosis (c/b portal hypertensive gastropathy, ascites, encephalopathy, esophageal varices, and bone marrow aplasia), DM type II, CKD stage IV with chronic anemia, morbid obesity, hypothyroidism, wheelchair-bound d/t CIDP, and depression who was admitted to [facility] MICU on 07/28/2020 after transfer from [facility] for possible CRRT in setting of acute renal failure. At OSH, hospitalization was complicated by acute on chronic decompensated cirrhosis with bleeding esophageal varices (now s/p banding x5), septic shock, and progressive renal failure, likely provoked by ESBL UTI.    Patient has remained hemodynamically stable since arrival and transferred to floor on 07/29 with continued renal failure, still oliguric. Renal felt no need for HD at this time. Completed 72hrs of octreotide gtt post-op via RIJ (removed 07/30) and has been continued on lactulose, rifaximin, and prophylactic ceftriaxone. Has also been continued on midodrine, per Hepatology.    Updates:   - Consult IR for paracentesis today - diagnostic labs ordered   - albumin 50 g after tap  - NPO currently resume Regular diet (<2g Na, hi-protein) after paracentesis - discontinue D10 infusion after diet resumed       #Metabolic acidosis  #Oliguric MARCELO on CKD stage IV  Baseline Cr 2-3. At OSH, Cr 5.44 with minimal UOP on 07/28 and renal US negative for mass or hydronephrosis. MARCELO likely multifactorial (prerenal and intrinsic renal) from ATN (shock). Potassium WNL. UOP has been minimal. Per Renal, likely not HRS so albumin challenge not needed. Serum creatinine downtrending, albeit slowly. Octreotide stopped and RIJ removed 07/30.  - Nephrology consult appreciated, signed off   - No acute  need for dialysis at this time   - Continue midodrine 10 mg TID  - Renal diet, avoid nephrotoxic agents  - Strict I/Os and daily wts  - Consider removing Wellington tomorrow pending renal function after para  - Daily renal panel  - Continue PTA sodium bicarb 1300 mg BID  - Continue PTA sevelamer 1600 mg TID with meals     #Acute on chronic decompensated EtOH cirrhosis  #Delirium, resolved  #Hepatic encephalopathy, resolved  #Ascites  #Alcohol use disorder in remission  Patient was admitted to OSH on 07/15 with confusion and difficulty speaking; brain MR showed mild chronic microvascular ischemic changes within cerebral white matter and small chronic cerebellar infarcts with no acute findings. Ultimately confusion was thought to be 2/2 ESBL UTI for which she completed course of ertapenem and Flagyl. At OSH, ammonia was slightly elevated to 40 and mental status improved with lactulose. On admission, ammonia WNL and lactulose discontinued. Patient has been able to recall details of her hospitalization with occasional difficulty but overall appears mentally very clear and able to converse easily. Currently A&Ox4. She is certainly at higher risk for delirium, however, so would maintain precautions. S/p large volume paracentesis (5L) on 07/19 and 07/24; per family, has had ~6 volodymyr over past few months. Abdominal US on 07/29 without concerning findings; showing portal HTN, splenomegaly, and diffuse gallbladder wall thickening (possible adenomyomatosis or cholesterolosis). Completed courses of ertapenem and Flagyl on 07/23. No active infection. SBP ppx with ceftriaxone given bleeding varices as above. Patient will likely need repeat therapeutic paracenteses on outpatient basis.  - Delirium precautions  - PRN volodymyr for comfort (always w/ diagnostic studies except cytology +/- w/ albumin replacement)    - Consult IR for para today    - 25% albumin replacement 50g after para  - Regular diet with Na restriction to 2000 mg daily,  1.5 mg/kg/day protein   - Continue rifaximin and lactulose titrated to 3-4 stools/day  - PT/OT    MELD-Na score: 25 at 7/31/2020  3:41 AM  MELD score: 25 at 7/31/2020  3:41 AM  Calculated from:  Serum Creatinine: 3.95 mg/dL at 7/31/2020  3:41 AM  Serum Sodium: 140 mmol/L (Rounded to 137 mmol/L) at 7/31/2020  3:41 AM  Total Bilirubin: 1.0 mg/dL at 7/31/2020  3:41 AM  INR(ratio): 1.57 at 7/31/2020  3:41 AM  Age: 70 years 4 months    #Acute on chronic normocytic anemia   #Esophageal varices s/p banding x5  #Hypovolemic shock, resolved  #Thrombocytopenia of chronic liver disease   At OSH, initially thought septic shock 2/2 UTI. Hgb dropped 8.4 --> 7.3 --> 6.4 at so received 3-4u pRBCs while there and another en route to [facility]. Hypovolemic shock precipitated by BRBPR and bleeding esophageal varices, which were banded x5 on 07/27. Required intermittent BP support with Levophed at OSH (weaned off 07/29 morning). Patient reportedly had episode of hematemesis morning prior to transfer. On admission, had episode of melena on 07/28 night. Hgb has been stable while in ICU, 8.1 --> 8.1 --> 7.9. Consent for PRN transfusion obtained via phone with POA; PIV in place. Plts 58 on admission, consistent with recent baseline; improving slightly. Has not required pressors here. Finished 72hrs of octreotide gtt and RIJ removed on 07/31. Platelets 50s-70s.  - Regular diet as above  - GI Hepatology following, appreciate recs   - Continue ceftriaxone (7-day course) for GIB (end date 8/3)   - Continue IV pantoprazole BID   - Monitor stool output   - Trend plts   - Monitor Hgb BID, transfuse if <7  - Zofran and Tums PRN  - Incentive spirometry after para    #HFpEF  PTA on torsemide. Echo at OSH from 07/20 showed EF >75%, sclerotic mitral valve with trace MR. Elevated EF c/w high-output heart failure in setting of cirrhosis with ascites. Was continued on torsemide and started on spironolactone at OSH, but both were later held 2/2 hypotension  requiring pressor support (weaned 07/28 morning).  - Hold PTA torsemide for now     #IDDM  PTA on insulin glargine 14u QHS. Well-controlled, A1c 5.3% on 07/15. At OSH, was on medium SSI and 7u glargine QHS.   Recent Labs   Lab 07/31/20  1123 07/31/20  1030 07/31/20  1005 07/31/20  0949 07/31/20  0341 07/30/20  2212 07/30/20  1735  07/30/20  0423  07/29/20  0420  07/28/20  1409   GLC  --   --   --   --  159*  --   --   --  178*  --  232*  --  233*   BGM 74 77 73 69*  --  206* 177*   < >  --    < >  --    < >  --     < > = values in this interval not displayed.   - Continue medium dose SSI  - Continue home dose glargine 14u at bedtime  - Hypoglycemia protocol      #Hypothyroidism   PTA on levothyroxine. TSH 16.58, free T4 0.78 @ OSH.   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 At OSH, received hydrocortisone 50mg Q6H for stress dosing in setting of hypotension. Weaned stress dosed hydrocortisone as patient has been normotensive. No RLS symptoms reported since admission.  - Resume home dose of 5 mg prednisone daily   - Continue PTA gabapentin and pramipexole     #Bone mineral disorder  #Osteoarthritis  PTA on sevelamer carbonate for BMD. OA longstanding, primarily in shoulders and right knee. Patient does not want APAP. Regarding BMD, Ca and P at goal. Having increased pain after therapies.  - Continue sevelamer  - Pain management: topical lidocaine and low-dose oxycodone  - PT/OT     #Gout  - Hold PTA allopurinol      Diet: <2 g, 1.5mg/kg/day protein diet  Fluids: None  Lines: Wellington catheter, L PIV  DVT Prophylaxis: Pneumatic Compression Devices  Wellington Catheter: in place, indication: Strict 1-2 Hour I&O  Code Status: DNR/DNI       Disposition Plan   Expected discharge: 3-4 days, recommended to prior living arrangement (TCU) once renal function improved.  Entered: Siobhan Espinoza MD 07/31/2020, 2:02 PM     The patient's  care was discussed with the Primary team.    Edilma Roque, MS4  Medicine - [place] 2  Pager [phone]    I saw the patient in conjunction with my MS4, Edilma Roque, and in the above documention reflects our shared examination and decision making with my edits in blue. Patient was staffed with my attending, Dr Kelly Tamayo.    Siobhan Espinoza MD  Internal Medicine & Pediatrics PGY4  Pager: [phone]    ______________________________________________________________________    Interval History   No acute events overnight. Patient in good spirits this morning. Continues to endorse some respiratory discomfort d/t abdominal pressure, worse when sitting, as well as loose stools with urgency since receiving lactulose. Also complains of reflux with intermittent but improving nausea. Knee pain markedly better with low-dose oxy. Enjoys talking about granddaughter Giuliana.     Medical student Edilma had long discussion with Caryl's daughter-in-law, Iraida Martin ([phone]), about why patient was transferred to [facility] and why dialysis was initially considered, which Iraida was relatively unaware of. Iraida recently underwent a kidney transplant and has had HD in the past so is familiar with Caryl's illness. Iraida voiced concern regarding Caryl's living arrangement and asked whether she'd be able to return to independent living eventually. She stated that a notice to vacate is required by today, 7/31, at Caryl's apartment if Caryl were to discharge to a TCU for an extended period of time on discharge and not return to her apartment. Because of the time-sensitive nature of this, Iraida requested a call from [facility] today, if possible.    Data reviewed today: I reviewed all medications, new labs and imaging results over the last 24 hours. I personally reviewed new labs.    Physical Exam   Vital Signs: Temp: 97.9  F (36.6  C) Temp src: Oral BP: 95/47   Heart Rate: 65 Resp: 12 SpO2: 94 % O2 Device: None (Room air)    Weight: 210 lbs 15.68 oz  General:  Sleeping but arousable to voice, pleasant, no acute distress, laying comfortably in bed  HEENT: Oral mucosa slightly tacky, brown film on tongue, bilateral submandibular swelling, PERRLA, EOMI  CV: Regular, regular rate, normal S1S2, blowing 3/6 holosystolic murmur, no clicks or rubs  Resp: Inspiratory crackles at bases bilaterally  Abd: Firm, distended without fluid wave, non-tympanic, nontender. Large midline hernia  : Wellington present draining clear yellow urine  Extremities: Radial pulses intact and symmetric, 1+ pedal edema  Skin: Well-perfused, chronic venous stasis changes to BLEs  Neuro: A&Ox4, no lateralizing symptoms or focal neurologic deficits. Difficult to assess asterixis, coarse resting tremor    Data   Recent Labs   Lab 07/31/20  0341 07/30/20  0423 07/29/20  0420   WBC 5.7 10.4 8.5   HGB 8.5* 8.4* 7.9*   * 97 97   PLT 59* 72* 63*   INR 1.57* 1.67* 1.66*    137 137   POTASSIUM 4.3 4.5 4.9   CHLORIDE 116* 112* 113*   CO2 17* 18* 18*   BUN 84* 76* 70*   CR 3.95* 4.09* 4.49*   ANIONGAP 7 7 6   LUIS ALFREDO 8.0* 8.4* 8.5   * 178* 232*   ALBUMIN 2.4* 2.5* 2.5*   PROTTOTAL 5.4* 5.5* 5.4*   BILITOTAL 1.0 1.1 1.1   ALKPHOS 86 87 80   ALT 8 9 9   AST 13 8 10

## 2020-07-31 NOTE — PLAN OF CARE
Major Shift Events:  Afebrile, VSS, Stats 95-98%NORIS Wellington in place 25/30cc/hr UOP. Maroon stool x3. BG to 68 in the AM , D10 started while NPO. I  Plan: Transferred to   For vital signs and complete assessments, please see documentation flowsheets.

## 2020-07-31 NOTE — PLAN OF CARE
ICU End of Shift Summary. See flowsheets for vital signs and detailed assessment.    Changes this shift: A&O. Forgetful at times. Following commands. Bannister 0. C/O generalized pain; PRN oxycodone given at HS. Afebrile. SB to SR. MAP's >65. One large maroon stool; MD notified and HGB 8.5 with morning labs. Wellington remains in place; continues to have low UOP (~25 mL/hr).     Plan: Transfer to med/surg when bed available. Paracentesis in IR today.

## 2020-07-31 NOTE — PROGRESS NOTES
"Social Work: Assessment with Discharge Plan    Patient Name:  Analy Martin  :  1950  Age:  70 year old  MRN:  [id number]  Risk/Complexity Score:     Completed assessment with:  Pt    Presenting Information   Reason for Referral:  Discharge plan  Date of Intake:  2020  Referral Source:  Chart Review  Decision Maker:  Patient at baseline  Alternate Decision Maker:  Per patient, her daughter-in-law Iraida is her designated decision-maker. She gave SW permission to reach out to other healthcare systems to obtain a copy.  submitted request via fax to [facility] medical records.  Health Care Directive:  Will bring in copy  Living Situation:  Apartment - independent living  Previous Functional Status:  Assistance with Other:  Patient's adult sons and daughter-in-law live nearby and frequently assist with IADLs  Patient and family understanding of hospitalization:  Appropriate. Patient described her health condition and the plan for today in detail.  Cultural/Language/Spiritual Considerations:  Christian  Adjustment to Illness:  \"I'm doing fine, considering.\"    Physical Health  Reason for Admission:  No diagnosis found.  Services Needed/Recommended:  TCU    Mental Health/Chemical Dependency  Diagnosis:  None identified  Support/Services in Place:  N/A  Services Needed/Recommended:  N/A    Support System  Significant relationship at present time:  Sons  Family of origin is available for support:  Yes  Other support available:  Daughter-in-law is one of patient's primary supports  Gaps in support system:  None identified  Patient is caregiver to:  None     Provider Information   Primary Care Physician:  Victor Hugo Davies   [phone]   Clinic:  [facility] [address] / [place]*      :  N/A    Financial   Income Source:  Not discussed  Financial Concerns:  Pt denies  Insurance:    Payor/Plan Subscriber Name Rel Member # Group #   [employer] - HU[employer] MEDIC* " PATRICIA VANN [facility] [place] [facility]\"" [id number]      [address] [address]   MEDICAID [place]* PATRICIA VANN \[facility] [place] [facility]\"" [id number]       [address] [address]       Discharge Plan   Patient and family discharge goal:  Home with home care would be patient's preference.  Provided education on discharge plan:  YES  Patient agreeable to discharge plan:  TBD.  A list of Medicare Certified Facilities was provided to the patient and/or family to encourage patient choice. Patient's choices for facility are:  No list provided yet. Patient reports if she absolutely has to go to rehab she would be amenable to [facility] in [place], as she has been there before.  Will NH provide Skilled rehabilitation or complex medical:  TBD  General information regarding anticipated insurance coverage and possible out of pocket cost was discussed. Patient and patient's family are aware patient may incur the cost of transportation to the facility, pending insurance payment: YES. Patient has MA so transportation should be covered.  Barriers to discharge:  Medical readiness, finalized discharge plan    Discharge Recommendations   Anticipated Disposition:  Facility:  TCU vs home with home care  Transportation Needs:  Other:  Pending patient needs at discharge  Name of Transportation Company and Phone:  [employer] if applicable: [phone]    Additional comments   SW completed assessment with patient in ICU. Patient presents as A+Ox4, pleasant and engaged, affect and speech WNL, thought process linear. Patient lives in an independent living apartment close to her two adult sons, her daughter-in-law and a 2 year-old granddaughter. She reports that her family is very supportive and her daughter-in-law is a particularly effective advocate for her. Patient spoke fondly of her granddaughter and is looking forward to seeing her. Pt and SW discussed discharge planning: pt would strongly prefer to go home. However, she is also aware that home may not  be a safe discharge plan when her hospital stay is over and prefers [facility] if a TCU stay is needed. Patient denies acute psychosocial concerns at this time. SW will continue to remain available for patient and family support, discharge planning, other resources and support PRN.    ALANNAH Cook, [facility]  ICU    [facility]   P: [phone]  Pager: [phone]

## 2020-08-01 NOTE — PLAN OF CARE
LEW IGLESIAS knee pain helped by Lido patch. Up in chair with lift, says lost pivot ability about 1 month ago. Tolerating regular diet this am, to begin Jordan Counts this afternoon. Matthews output adequate. Seen by team, will remove matthews to see if void ability possible.

## 2020-08-01 NOTE — PLAN OF CARE
/47 (BP Location: Left arm)   Pulse 70   Temp 96.9  F (36.1  C) (Oral)   Resp 18   Ht 1.524 m (5')   Wt 95.7 kg (210 lb 15.7 oz)   SpO2 94%   BMI 41.20 kg/m      Time: 4504-8548    Reason for admission: Acute renal failure  Activity:Bedrest- mechanical lift. Frequently repositioning. Pulsate mattress needs to be placed.   Pain: Complains of Left flank pain- PRN oxy given. Heat applied to back.  Neuro: A&O x4. Forgetful at times.   Cardiac: Stenosis noted. Denies chest pain   Respiratory: Denies SOB. Pt on RA. Infrequent cough.   GI/: Wellington in place w/ OUTPUT. Pt having frequent loose BM. BM x3 overnight- dark maroon in color.   Diet: Regular, tolerating.   Lines: PIV- saline locked. Wellington catheter. Old fistula on left arm.   Wounds: Moist/fungal rash under arms, breast, groin, and pannus area.     Labs/Imaging: Bg monitored q4hr. Bg @ 0000: 111 & 0400: 91. Hgb: 8.5    New changes this shift: Pt still having frequently loose stools. Complains of left flank pain.     Plan: Continue antibiotics.     Continue to monitor and follow POC

## 2020-08-01 NOTE — PLAN OF CARE
A&O VSS on RA. PIV SL. Micatin powder for moist/fungal rash under arms, breasts, panus, groin. Having a lot of stool output, incontinent, barrier cream. Pt not moving well in bed, pulsate mattress ordered. Wellington catheter in place, low UOP. Reg diet, good/fair appetite. BG at HS 90. Pt stated not comfortable with 14 units Lantus while low all day. MD ordered 7 units Lantus (monitoring). C/o abdominal pain after paracentesis, Oxycodone given. Calls to make needs known.

## 2020-08-01 NOTE — PROGRESS NOTES
Resident/Fellow Attestation   I, Chava Azevedo, was present with the medical student who participated in the service and in the documentation of the note.  I have verified the history and personally performed the physical exam and medical decision making.  I agree with the assessment and plan of care as documented in the note.      This is a shared note, my edits are incorporated    Chava Azevedo MD  PGY1  Date of Service (when I saw the patient): 08/01/20    [facility], [place]    Progress Note - Maroon 2 Service        Date of Admission:  7/28/2020    Assessment & Plan   Caryl Martin is a 70 y.o. F with past medical history of HFpEF, CVA, aortic stenosis and mitral stenosis, HTN, alcoholic cirrhosis (c/b portal hypertensive gastropathy, ascites, encephalopathy, esophageal varices, and bone marrow aplasia), DM type II, CKD stage IV with chronic anemia, morbid obesity, hypothyroidism, wheelchair-bound d/t CIDP, and depression who was admitted to [facility] MICU on 07/28/2020 after transfer from [facility] for possible CRRT in setting of acute renal failure. At OSH, hospitalization was complicated by acute on chronic decompensated cirrhosis with bleeding esophageal varices (now s/p banding x5), septic shock, and progressive renal failure, likely provoked by ESBL UTI.    Patient has remained hemodynamically stable since arrival and transferred to floor on 07/29 with continued renal failure but improving serum creatinine and UOP. Renal felt no need for HD at this time. Completed 72hrs of octreotide gtt post-op via RIJ (removed 07/30) and has been continued on lactulose, rifaximin, and prophylactic ceftriaxone. Has also been continued on midodrine, per Hepatology. Underwent paracentesis with 3L removed by IR and albumin replacement thereafter on 07/31.    Updates:   - Voiding trial today  - Regular diet (<2g Na, hi-protein) with calorie counts, encourage PO intake    #Metabolic  acidosis  #Oliguric MARCELO on CKD stage IV  Baseline Cr 2-3. At OSH, Cr 5.44 with minimal UOP on 07/28 and renal US negative for mass or hydronephrosis. MARCELO likely multifactorial (prerenal and intrinsic renal) from ATN (shock). Potassium WNL. UOP has been minimal. Per Renal, likely not HRS so albumin challenge not needed. Serum creatinine downtrending, albeit slowly. Octreotide stopped and RIJ removed 07/30.  - Nephrology consult appreciated, signed off   - No acute need for dialysis at this time   - Continue midodrine 10 mg TID  - Renal diet, avoid nephrotoxic agents  - Strict I/Os and daily wts  - Daily renal panel  - Continue PTA sodium bicarb 1300 mg BID  - Continue PTA sevelamer 1600 mg TID with meals  - Voiding trial today, remove Wellington, PVRs     #Acute on chronic decompensated EtOH cirrhosis  #Delirium, resolved  #Hepatic encephalopathy, resolved  #Ascites  #Alcohol use disorder in remission  Patient was admitted to OSH on 07/15 with confusion and difficulty speaking; brain MR showed mild chronic microvascular ischemic changes within cerebral white matter and small chronic cerebellar infarcts with no acute findings. Ultimately confusion was thought to be 2/2 ESBL UTI for which she completed course of ertapenem and Flagyl. At OSH, ammonia was slightly elevated to 40 and mental status improved with lactulose. On admission, ammonia WNL and lactulose discontinued. Patient has been able to recall details of her hospitalization with occasional difficulty but overall appears mentally very clear and able to converse easily. Currently A&Ox4. She is certainly at higher risk for delirium, however, so would maintain precautions. S/p large volume paracentesis (5L) on 07/19 and 07/24; per family, has had ~6 volodymyr over past few months. Abdominal US on 07/29 without concerning findings; showing portal HTN, splenomegaly, and diffuse gallbladder wall thickening (possible adenomyomatosis or cholesterolosis). Completed courses of  ertapenem and Flagyl on 07/23. No active infection. SBP ppx with ceftriaxone given bleeding varices as above. S/p repeat para (3L) by IR with 50g albumin replacement on 07/31; fluid sent for diagnostics and negative for SBP. Has had modest PO intake.  - PRN volodymyr for comfort (always w/ diagnostic studies except cytology +/- w/ albumin replacement) - may need regularly as outpt  - Regular diet with Na restriction to 2000 mg daily, 1.5 mg/kg/day protein   - Continue rifaximin and lactulose titrated to 3-4 stools/day  - PT/OT  - Calorie counts, encourage PO intake    MELD-Na score: 25 at 8/1/2020  7:04 AM  MELD score: 25 at 8/1/2020  7:04 AM  Calculated from:  Serum Creatinine: 3.70 mg/dL at 8/1/2020  7:04 AM  Serum Sodium: 141 mmol/L (Rounded to 137 mmol/L) at 8/1/2020  7:04 AM  Total Bilirubin: 1.4 mg/dL at 8/1/2020  7:04 AM  INR(ratio): 1.57 at 7/31/2020  3:41 AM  Age: 70 years 4 months    #Acute on chronic normocytic anemia   #Esophageal varices s/p banding x5  #Hypovolemic shock, resolved  #Thrombocytopenia of chronic liver disease   At OSH, initially thought septic shock 2/2 UTI. Hgb dropped 8.4 --> 7.3 --> 6.4 at so received 3-4u pRBCs while there and another en route to [facility]. Hypovolemic shock precipitated by BRBPR and bleeding esophageal varices, which were banded x5 on 07/27. Required intermittent BP support with Levophed at OSH (weaned off 07/29 morning). Patient reportedly had episode of hematemesis morning prior to transfer. On admission, had episode of melena on 07/28 night. Hgb has been stable while in ICU, 8.1 --> 8.1 --> 7.9. Consent for PRN transfusion obtained via phone with POA; PIV in place. Plts 58 on admission, consistent with recent baseline; improving slightly. Has not required pressors here. Finished 72hrs of octreotide gtt and RIJ removed on 07/31. Platelets stable in the 50s-70s.  - Regular diet as above  - GI Hepatology consulted, signed off   - Continue ceftriaxone (7-day course) for GIB  (end date 8/3)   - Continue IV pantoprazole BID   - Monitor stool output   - Trend plts   - Monitor Hgb BID, transfuse if <7  - Zofran and Tums PRN  - Incentive spirometry     #HFpEF  PTA on torsemide. Echo at OSH from 07/20 showed EF >75%, sclerotic mitral valve with trace MR. Elevated EF c/w high-output heart failure in setting of cirrhosis with ascites. Was continued on torsemide and started on spironolactone at OSH, but both were later held 2/2 hypotension requiring pressor support (weaned 07/28 morning).  - Hold PTA torsemide for now     #IDDM  PTA on insulin glargine 14u QHS. Well-controlled, A1c 5.3% on 07/15. At OSH, was on medium SSI and 7u glargine QHS. Overnight 07/31-08/01, decreased Lantus to 7u QHS from 14u QHS.  Recent Labs   Lab 08/01/20  1336 08/01/20  0704 08/01/20  0356 08/01/20  0005 07/31/20  2138 07/31/20  1802 07/31/20  1620  07/31/20  0341  07/30/20  0423  07/29/20  0420  07/28/20  1409   GLC  --  99  --   --   --   --   --   --  159*  --  178*  --  232*  --  233*   *  --  91 111* 90 81 84   < >  --    < >  --    < >  --    < >  --     < > = values in this interval not displayed.   - Continue medium dose SSI  - Continue reduced dose glargine 7u at bedtime tonight, may increase tomorrow based on BG  - Hypoglycemia protocol      #Hypothyroidism   PTA on levothyroxine. TSH 16.58, free T4 0.78 @ OSH.   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 At OSH, received hydrocortisone 50mg Q6H for stress dosing in setting of hypotension. Weaned stress dosed hydrocortisone as patient has been normotensive. No RLS symptoms reported since admission.  - Continue home dose of 5 mg prednisone daily   - Continue PTA gabapentin and pramipexole     #Bone mineral disorder  #Osteoarthritis  PTA on sevelamer carbonate for BMD. OA longstanding, primarily in shoulders and right knee. Patient does not want APAP.  Regarding BMD, Ca and P at goal. Having increased pain after therapies.  - Continue sevelamer  - Pain management: topical lidocaine and low-dose oxycodone  - PT/OT    #Intertrigo  Rash diffusely under arms, breasts, pannus, and groin per nursing.  - Micatin powder PRN     #Gout  - Hold PTA allopurinol      Diet: <2 g, 1.5mg/kg/day protein diet  Fluids: None  Lines: Wellington catheter, L PIV  DVT Prophylaxis: Pneumatic Compression Devices  Wellington Catheter: in place, indication: Strict 1-2 Hour I&O  Code Status: DNR/DNI       Disposition Plan   Expected discharge: 2-3 days, recommended to prior living arrangement (TCU) once continued stable renal function and TCU placement established.  Entered: Edilma Roque 08/01/2020, 3:39 PM     The patient's care was discussed with the Primary team.    Edilma Roque, MS4  Medicine - Maroon 2  Pager [phone]  ______________________________________________________________________    Interval History   No acute events overnight. Per nursing, patient still having loose maroon stools without cuba blood.    This morning, patient endorses some mild abdominal discomfort and left-sided back discomfort since yesterday. Believes breathing has improved since paracentesis and would like to try sitting up in a chair today. Had only a small amount of nausea yesterday and tolerated paracentesis. This afternoon, patient had an episode of esophageal pain with emesis which resolved shortly thereafter.    Medical student Edilma spoke with patient's daughter-in-law, Iraida, this afternoon with an interval update. Iraida would like to be phoned in to rounding conversation tomorrow morning to discuss discharge planning.     Data reviewed today: I reviewed all medications, new labs and imaging results over the last 24 hours. I personally reviewed new labs.    Physical Exam   Vital Signs: Temp: 97  F (36.1  C) Temp src: Oral BP: 110/59   Heart Rate: 68 Resp: 16 SpO2: 96 % O2 Device: None (Room air)    Weight: 210  lbs 15.68 oz  General: Sleeping but arousable to voice, pleasant, no acute distress, laying supine in bed  HEENT: Oral mucosa tacky, brown film on tongue, bilateral submandibular swelling, EOMI  CV: Regular, regular rate, normal S1S2, blowing 3/6 holosystolic murmur, no clicks or rubs  Resp: Clear to auscultation of anterior apices  Abd: Less firm though still distended without fluid wave, non-tympanic, nontender. Large midline hernia and subumbilical healed surgical scar  : Wellington present draining clear yellow urine  Extremities: Radial pulses intact and symmetric, 1+ pedal edema  Skin: Well-perfused, chronic venous stasis changes to BLEs  Neuro: A&Ox4, no lateralizing symptoms or focal neurologic deficits. Difficult to assess asterixis, coarse resting tremor    Data   Recent Labs   Lab 08/01/20  0704 07/31/20  0341 07/30/20  0423 07/29/20  0420   WBC 5.4 5.7 10.4 8.5   HGB 7.9* 8.5* 8.4* 7.9*   MCV 99 109* 97 97   PLT 58* 59* 72* 63*   INR  --  1.57* 1.67* 1.66*    140 137 137   POTASSIUM 4.6 4.3 4.5 4.9   CHLORIDE 114* 116* 112* 113*   CO2 18* 17* 18* 18*   BUN 80* 84* 76* 70*   CR 3.70* 3.95* 4.09* 4.49*   ANIONGAP 8 7 7 6   LUIS ALFREDO 8.0* 8.0* 8.4* 8.5   GLC 99 159* 178* 232*   ALBUMIN 2.5* 2.4* 2.5* 2.5*   PROTTOTAL 5.1* 5.4* 5.5* 5.4*   BILITOTAL 1.4* 1.0 1.1 1.1   ALKPHOS 78 86 87 80   ALT 8 8 9 9   AST 12 13 8 10

## 2020-08-02 NOTE — PROGRESS NOTES
1730: RN called stroke code to come assess pt due to pt having unequal pupils and slumped over to one side in bed, she seems more lethargic and feels foggy per her report.

## 2020-08-02 NOTE — PROGRESS NOTES
[facility], [place]    Progress Note - Massiel 2 Service        Date of Admission:  7/28/2020    Assessment & Plan   Caryl Martin is a 70 y.o. F with past medical history of HFpEF, CVA, aortic stenosis and mitral stenosis, HTN, alcoholic cirrhosis (c/b portal hypertensive gastropathy, ascites, encephalopathy, esophageal varices, and bone marrow aplasia), DM type II, CKD stage IV with chronic anemia, morbid obesity, hypothyroidism, wheelchair-bound d/t CIDP, and depression who was admitted to [facility] MICU on 07/28/2020 after transfer from [facility] for possible CRRT in setting of acute renal failure. At OSH, hospitalization was complicated by acute on chronic decompensated cirrhosis with bleeding esophageal varices (now s/p banding x5), septic shock, and progressive renal failure, likely provoked by ESBL UTI.    Patient has remained hemodynamically stable since arrival and transferred to floor on 07/29 with continued renal failure but improving serum creatinine and UOP. Renal felt no need for HD at this time. Completed 72hrs of octreotide gtt post-op via RIJ (removed 07/30) and has been continued on lactulose, rifaximin, and prophylactic ceftriaxone. Has also been continued on midodrine, per Hepatology. Underwent paracentesis with 3L removed by IR and albumin replacement thereafter on 07/31.    Updates:   - monitor UOP, PVRs, straight cath >400mL  - glargine to 12 unit(s) at bedtime  - encourage hydration and nutrition, calorie counts    #Metabolic acidosis  #Oliguric MARECLO on CKD stage IV  Baseline Cr 2-3. At OSH, Cr 5.44 with minimal UOP on 07/28 and renal US negative for mass or hydronephrosis. MARCELO likely multifactorial (prerenal and intrinsic renal) from ATN (shock). Potassium WNL. UOP has been minimal. Per Renal, likely not HRS so albumin challenge not needed. Serum creatinine downtrending, albeit slowly. Octreotide stopped and RIJ removed 07/30.  - Nephrology consult  appreciated, signed off   - No acute need for dialysis at this time   - Continue midodrine 10 mg TID  - Renal diet, avoid nephrotoxic agents  - Strict I/Os and daily wts  - Daily renal panel  - Continue PTA sodium bicarb 1300 mg BID  - Continue PTA sevelamer 1600 mg TID with meals  - monitor UOP, PVR/bladder scan qshift, straight cath >400mL    #Acute on chronic decompensated EtOH cirrhosis  #Delirium, resolved  #Hepatic encephalopathy, resolved  #Ascites  #Alcohol use disorder in remission  Patient was admitted to OSH on 07/15 with confusion and difficulty speaking; brain MR showed mild chronic microvascular ischemic changes within cerebral white matter and small chronic cerebellar infarcts with no acute findings. Ultimately confusion was thought to be 2/2 ESBL UTI for which she completed course of ertapenem and Flagyl. At OSH, ammonia was slightly elevated to 40 and mental status improved with lactulose. On admission, ammonia WNL and lactulose discontinued. Patient has been able to recall details of her hospitalization with occasional difficulty but overall appears mentally very clear and able to converse easily. Currently A&Ox4. She is certainly at higher risk for delirium, however, so would maintain precautions. S/p large volume paracentesis (5L) on 07/19 and 07/24; per family, has had ~6 volodymyr over past few months. Abdominal US on 07/29 without concerning findings; showing portal HTN, splenomegaly, and diffuse gallbladder wall thickening (possible adenomyomatosis or cholesterolosis). Completed courses of ertapenem and Flagyl on 07/23. No active infection. SBP ppx with ceftriaxone given bleeding varices as above. S/p repeat para (3L) by IR with 50g albumin replacement on 07/31; fluid sent for diagnostics and negative for SBP. Has had modest PO intake.  - PRN volodymyr for comfort (always w/ diagnostic studies except cytology +/- w/ albumin replacement) - may need regularly and may need diagnostic/therapeutic para  8/3 or 8/4 prior to discharge  - Regular diet with Na restriction to 2000 mg daily, 1.5 mg/kg/day protein   - Continue rifaximin and lactulose titrated to 3-4 stools/day  - PT/OT  - Calorie counts, encourage PO intake    MELD-Na score: 25 at 8/1/2020  7:04 AM  MELD score: 25 at 8/1/2020  7:04 AM  Calculated from:  Serum Creatinine: 3.70 mg/dL at 8/1/2020  7:04 AM  Serum Sodium: 141 mmol/L (Rounded to 137 mmol/L) at 8/1/2020  7:04 AM  Total Bilirubin: 1.4 mg/dL at 8/1/2020  7:04 AM  INR(ratio): 1.57 at 7/31/2020  3:41 AM  Age: 70 years 4 months    #Acute on chronic normocytic anemia   #Esophageal varices s/p banding x5  #Hypovolemic shock, resolved  #Thrombocytopenia of chronic liver disease   At OSH, initially thought septic shock 2/2 UTI. Hgb dropped 8.4 --> 7.3 --> 6.4 at so received 3-4u pRBCs while there and another en route to [facility]. Hypovolemic shock precipitated by BRBPR and bleeding esophageal varices, which were banded x5 on 07/27. Required intermittent BP support with Levophed at OSH (weaned off 07/29 morning). Patient reportedly had episode of hematemesis morning prior to transfer. On admission, had episode of melena on 07/28 night. Hgb has been stable while in ICU, 8.1 --> 8.1 --> 7.9. Consent for PRN transfusion obtained via phone with POA; PIV in place. Plts 58 on admission, consistent with recent baseline; improving slightly. Has not required pressors here. Finished 72hrs of octreotide gtt and RIJ removed on 07/31. Platelets stable in the 50s-70s.  - Regular diet as above  - GI Hepatology consulted, signed off   - Continue ceftriaxone (7-day course) for GIB (end date 8/3)   - Continue IV pantoprazole BID   - Monitor stool output   - Trend plts   - Monitor Hgb BID, transfuse if <7  - Zofran and Tums PRN  - Incentive spirometry     #HFpEF  PTA on torsemide. Echo at OSH from 07/20 showed EF >75%, sclerotic mitral valve with trace MR. Elevated EF c/w high-output heart failure in setting of cirrhosis with  ascites. Was continued on torsemide and started on spironolactone at OSH, but both were later held 2/2 hypotension requiring pressor support (weaned 07/28 morning).  - Hold PTA torsemide for now     #IDDM  PTA on insulin glargine 14u QHS. Well-controlled, A1c 5.3% on 07/15. At OSH, was on medium SSI and 7u glargine QHS. Overnight 07/31-08/01, decreased Lantus to 7u QHS from 14u QHS.  Recent Labs   Lab 08/02/20  0220 08/01/20  2118 08/01/20  1812 08/01/20  1336 08/01/20  0704 08/01/20  0356 08/01/20  0005  07/31/20  0341  07/30/20  0423  07/29/20  0420  07/28/20  1409   GLC  --   --   --   --  99  --   --   --  159*  --  178*  --  232*  --  233*   * 166* 165* 134*  --  91 111*   < >  --    < >  --    < >  --    < >  --     < > = values in this interval not displayed.   - Continue medium dose SSI  - Continue reduced dose glargine 7u at bedtime tonight, may increase tomorrow based on BG  - Hypoglycemia protocol      #Hypothyroidism   PTA on levothyroxine. TSH 16.58, free T4 0.78 @ OSH.   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 At OSH, received hydrocortisone 50mg Q6H for stress dosing in setting of hypotension. Weaned stress dosed hydrocortisone as patient has been normotensive. No RLS symptoms reported since admission.  - Continue home dose of 5 mg prednisone daily   - Continue PTA gabapentin and pramipexole     #Bone mineral disorder  #Osteoarthritis  PTA on sevelamer carbonate for BMD. OA longstanding, primarily in shoulders and right knee. Patient does not want APAP. Regarding BMD, Ca and P at goal. Having increased pain after therapies.  - Continue sevelamer  - Pain management: topical lidocaine and low-dose oxycodone  - PT/OT    #Intertrigo  Rash diffusely under arms, breasts, pannus, and groin per nursing.  - Micatin powder PRN     #Gout  - Hold PTA allopurinol      Diet: <2 g, 1.5mg/kg/day protein  diet  Fluids: None  Lines: Wellington catheter, L PIV  DVT Prophylaxis: Pneumatic Compression Devices  Wellington Catheter: not present  Code Status: DNR/DNI       Disposition Plan   Expected discharge: 2-3 days, recommended to prior living arrangement (TCU) once continued stable renal function and TCU placement established.  Entered: Chava Azevedo MD 08/02/2020, 6:37 AM     The patient's care was discussed with the Primary team.    Chava Azevedo MD  Internal Medicine, PGY-1  [phone]    ______________________________________________________________________    Interval History   No acute events overnight.Per nursing, patient continues to have loose stools multiple times during the night.     Patient complains of poor sleep and increasing abdominal girth and discomfort. Was unable to urinate last night even though she had urge sensation. Was straight cathed for 500 ccs. Low urine output since, low PO fluid intake also. Decreased appetite as well as postprandial fullness even after small amount of PO, feels belly is so distended. Also notes increased back pain because she hasn't moved as much due to not feeling well.     jackelyn KhouryA, was conference called in during rounds this morning and was updated with the plan for TCU, kidney injury, and overall health.     Data reviewed today: I reviewed all medications, new labs and imaging results over the last 24 hours. I personally reviewed new labs.    Physical Exam   Vital Signs: Temp: 98.7  F (37.1  C) Temp src: Oral BP: 122/55   Heart Rate: 69 Resp: 18 SpO2: 94 % O2 Device: None (Room air)    Weight: 210 lbs 15.68 oz  General: Sleeping but arousable to voice, pleasant, no acute distress, sitting in chair  HEENT: Dry mucous membranes, bilateral submandibular swelling, EOMI  CV: Regular, regular rate, normal S1S2, blowing 3/6 holosystolic murmur, no clicks or rubs  Resp: Clear to auscultation of anterior apices  Abd: Firm, distended without fluid wave, tympanic, diffuse  mild tendernes to palpation. Large midline hernia and subumbilical healed surgical scar  : Wellington present draining clear yellow urine  Extremities: Radial pulses intact and symmetric, 1+ pedal edema  Skin: Well-perfused, chronic venous stasis changes to BLEs  Neuro: A&Ox4, no lateralizing symptoms or focal neurologic deficits. Difficult to assess asterixis, coarse resting tremor    Data   Recent Labs   Lab 08/01/20  0704 07/31/20  0341 07/30/20  0423 07/29/20  0420   WBC 5.4 5.7 10.4 8.5   HGB 7.9* 8.5* 8.4* 7.9*   MCV 99 109* 97 97   PLT 58* 59* 72* 63*   INR  --  1.57* 1.67* 1.66*    140 137 137   POTASSIUM 4.6 4.3 4.5 4.9   CHLORIDE 114* 116* 112* 113*   CO2 18* 17* 18* 18*   BUN 80* 84* 76* 70*   CR 3.70* 3.95* 4.09* 4.49*   ANIONGAP 8 7 7 6   LUIS ALFREDO 8.0* 8.0* 8.4* 8.5   GLC 99 159* 178* 232*   ALBUMIN 2.5* 2.4* 2.5* 2.5*   PROTTOTAL 5.1* 5.4* 5.5* 5.4*   BILITOTAL 1.4* 1.0 1.1 1.1   ALKPHOS 78 86 87 80   ALT 8 8 9 9   AST 12 13 8 10

## 2020-08-02 NOTE — PLAN OF CARE
/49 (BP Location: Left arm)   Pulse 70   Temp 97.5  F (36.4  C) (Oral)   Resp 16   Ht 1.524 m (5')   Wt 95.7 kg (210 lb 15.7 oz)   SpO2 95%   BMI 41.20 kg/m      Time: 4395-5704     Reason for admission: Acute renal failure  Activity:Bedrest- mechanical lift. Frequently repositioning. Pulsate mattress in place.   Pain: Complains of back pain PRN oxy given. Heat applied to back.  Neuro: A&O x4. Forgetful at times.   Cardiac: Stenosis noted. Denies chest pain   Respiratory: Denies SOB. Pt on RA. Infrequent cough.   GI/: Pt having frequent loose BM. BM x4 overnight- dark maroon/brown in color.   Diet: Regular, tolerating.   Lines: PIV- saline locked. Old fistula on left arm.   Wounds: Moist/fungal rash under arms, breast, groin, and pannus area- powder applied. Barrier cream applied to bottom.    Labs/Imaging: Bg monitored overnight.      New changes this shift: Pt still having frequently loose stools. Complains of back pain- PRN oxy given. Pt complaining of difficulty voiding. Cross cover paged- Provider ordered if pt is experiencing pain/discomfort to bladder scan & straight cath. Pt straight cathed for 550 ml. Possibly place matthews in morning.      Continue to monitor and follow POC

## 2020-08-02 NOTE — PLAN OF CARE
A&O (forgetful, slow to answer) VSS on RA. PIV SL. Low sodium diet, fair appetite. Incontinent of B&B. Pulled matthews at 1600. BM x 1 this shift. Voids small amounts, difficult to differentiate from watery stool in pad (monitoring). Up to recliner with lift. Moist reddened skin, Micatin powder and barrier cream in use. Calls to make needs known.

## 2020-08-02 NOTE — CONSULTS
[facility], [place]    Stroke Consult Note    Reason for Consult: Unequal pupils    Chief Complaint: Unequal pupils    HPI    Analy Martin is a 70 year old female with a history of HFpEF, CVA, aortic stenosis and mitral stenosis, HTN, alcoholic cirrhosis (c/b portal hypertensive gastropathy, ascites, encephalopathy, esophageal varices, and bone marrow aplasia), DM type II, CKD stage IV with chronic anemia, morbid obesity, hypothyroidism, wheelchair-bound d/t CIDP, and depression  who was admitted on 7/28/20 with multiple issues including acute on chronic decompensated cirrhosis, acute renal failure and sepsis.     Patient noted to be have unequal pupils today but both reactive. RN reports pt is slumped over to R side; on discussion, pt says she is lying that way due to comfort and is able to straighten herself out. She denies any new symptoms; says shes just tired today. Stroke code activated for unequal pupils. Last known normal unknown.     TPA Treatment   Not given due to low suspicion for stroke.    Endovascular Treatment  Not initiated due to absence of proximal vessel occlusion    Impression  Stroke code activated for unequal pupils. Last known normal unknown. NIHSS 4 for baseline LE weakness. CT head was not pursued given her pupils were reactive and she had no new focal neurologic findings on exam. Given lack of any true new neurologic deficit, no further work up recommended from stroke perspective.     Recommendations  - No further work up from stroke perspective    Thank you for this consult. No further stroke evaluation is recommended, so we will sign off. Please contact us with any additional questions.    Patient was discussed with stroke fellow, Dr. Dubon. The attending is Dr. Aleman.    Kortney Shea MD  Neurology PGY3  [phone]  ______________________________________________________    Past Medical History   No past medical history on file.  Past Surgical History    Past Surgical History:   Procedure Laterality Date     IR PARACENTESIS  7/31/2020     Medications   Home Meds  Prior to Admission medications    Medication Sig Start Date End Date Taking? Authorizing Provider   acetaminophen (TYLENOL) 500 MG tablet Take 1,000 mg by mouth every 8 hours as needed  2/20/20  Yes Unknown, Entered By History   allopurinol (ZYLOPRIM) 300 MG tablet Take 300 mg by mouth daily 5/29/20  Yes Unknown, Entered By History   calcitRIOL (ROCALTROL) 0.25 MCG capsule Take 0.25 mcg by mouth every Sunday, Tuesday, Thursday, and Saturday. 3/4/20  Yes Unknown, Entered By History   calcitRIOL (ROCALTROL) 0.5 MCG capsule Take 0.5 mcg by mouth Take 0.5 mcg by mouth every Monday, Wednesday, and Friday.   Yes Unknown, Entered By History   ferrous fumarate 65 mg, [place]. FE,-Vitamin C 125 mg (VITRON-C)  MG TABS tablet Take 1 tablet by mouth daily   Yes Unknown, Entered By History   gabapentin (NEURONTIN) 300 MG capsule Take 300 mg by mouth At Bedtime   Yes Unknown, Entered By History   guaiFENesin (ROBITUSSIN) 100 MG/5ML SYRP Take 10 mLs by mouth every 4 hours as needed for cough    Yes Unknown, Entered By History   insulin aspart (NOVOLOG FLEXPEN) 100 UNIT/ML pen Inject 2 Units Subcutaneous 3 times daily (before meals)   Yes Unknown, Entered By History   insulin glargine (LANTUS PEN) 100 UNIT/ML pen Inject 14 Units Subcutaneous At Bedtime   Yes Unknown, Entered By History   ipratropium (ATROVENT) 0.06 % nasal spray Spray 2 sprays in nostril daily   Yes Unknown, Entered By History   iron polysaccharides (NU-IRON) 150 MG capsule Take 150 mg by mouth daily (with dinner)   Yes Unknown, Entered By History   levothyroxine (SYNTHROID/LEVOTHROID) 75 MCG tablet Take 75 mcg by mouth daily   Yes Unknown, Entered By History   melatonin (MELATONIN MAXIMUM STRENGTH) 5 MG tablet Take 5 mg by mouth At Bedtime   Yes Unknown, Entered By History   omeprazole (PRILOSEC) 20 MG DR capsule Take 20 mg by mouth 2 times daily  (before meals) 5/28/20  Yes Unknown, Entered By History   oxybutynin ER (DITROPAN XL) 15 MG 24 hr tablet Take 15 mg by mouth daily 7/6/20  Yes Unknown, Entered By History   polyethylene glycol (MIRALAX) 17 g packet Take 1 packet by mouth every other day 6/25/20  Yes Unknown, Entered By History   pramipexole (MIRAPEX) 0.125 MG tablet Take 0.125 mg by mouth At Bedtime   Yes Unknown, Entered By History   predniSONE (DELTASONE) 5 MG tablet Take 5 mg by mouth daily 6/27/20  Yes Unknown, Entered By History   sevelamer carbonate (RENVELA) 800 MG tablet Take 1,600 mg by mouth 3 times daily (with meals) 6/11/19  Yes Unknown, Entered By History   Skin Protectants, Misc. (EUCERIN) cream Apply topically At Bedtime 7/1/20  Yes Unknown, Entered By History   sodium bicarbonate 650 MG tablet Take 1,300 mg by mouth 2 times daily 12/10/19  Yes Unknown, Entered By History   tamsulosin (FLOMAX) 0.4 MG capsule Take 0.4 mg by mouth daily 3/11/20  Yes Unknown, Entered By History   vitamin D3 (CHOLECALCIFEROL) 50 mcg (2000 units) tablet Take 1 tablet by mouth daily   Yes Unknown, Entered By History   nadolol (CORGARD) 20 MG tablet Take 20 mg by mouth At Bedtime 5/28/20 5/28/21  Unknown, Entered By History   potassium chloride ER (KLOR-CON M) 20 MEQ CR tablet Take 20 mEq by mouth 2 times daily 4/3/20 4/3/21  Unknown, Entered By History   torsemide (DEMADEX) 20 MG tablet Take 60 mg by mouth 2 times daily 7/6/20   Unknown, Entered By History   traMADol (ULTRAM) 50 MG tablet Take 50 mg by mouth every 6 hours as needed 7/3/20   Unknown, Entered By History       Scheduled Meds    gabapentin  300 mg Oral At Bedtime     insulin aspart  1-7 Units Subcutaneous TID AC     insulin aspart  1-5 Units Subcutaneous At Bedtime     insulin glargine  12 Units Subcutaneous At Bedtime     ipratropium  2 spray Nasal Daily     lactulose  20 g Oral TID     levothyroxine  75 mcg Oral QAM AC     lidocaine  1 patch Transdermal Q24H     lidocaine   Transdermal Q8H      miconazole   Topical BID     midodrine  10 mg Oral TID w/meals     oxybutynin ER  15 mg Oral Daily     pantoprazole  40 mg Oral BID AC     pramipexole  0.125 mg Oral At Bedtime     predniSONE  5 mg Oral Daily     rifaximin  550 mg Oral BID     sevelamer carbonate  1,600 mg Oral TID w/meals     sodium bicarbonate  1,300 mg Oral BID     sodium chloride (PF)  3 mL Intracatheter Q8H     tamsulosin  0.4 mg Oral Daily     vitamin D3  50 mcg Oral Daily     PRN Meds   calcium carbonate, glucose **OR** dextrose **OR** glucagon, lidocaine 4%, lidocaine (buffered or not buffered), melatonin, naloxone, ondansetron **OR** ondansetron, oxyCODONE, simethicone, sodium chloride (PF)    Allergies   No Known Allergies  Family History   No family history on file.  Social History   Social History     Tobacco Use     Smoking status: Not on file   Substance Use Topics     Alcohol use: Not on file     Drug use: Not on file       Review of Systems   Review of systems not obtained due to patient factors - mental status       PHYSICAL EXAMINATION  Temp:  [96.9  F (36.1  C)-98.7  F (37.1  C)] 96.9  F (36.1  C)  Heart Rate:  [69-76] 76  Resp:  [12-20] 20  BP: (122-131)/(45-55) 124/45  SpO2:  [94 %-96 %] 96 %     General:  patient lying in bed without any acute distress    HEENT:  normocephalic/atraumatic  Cardio:  regular rate  Pulmonary:  no respiratory distress  Abdomen:  not examined  Extremities:  no edema  Skin:  intact, warm/dry     Neurologic  Mental Status:  Awake, alert, oriented to self, time and age. Follows simple commands and answering questions appropriately.  Cranial Nerves: R pupil 4mm, L pupil 3mm. EOMI. Face symmetric. Facial sensation intact. Hearing not formally tested but intact to conversation. No dysarthria.   Motor:  normal muscle tone and bulk, no abnormal movements, able to move all limbs spontaneously, no pronator drift, BUE without drift, BLE with baseline weakness thus cannot hold antigravity but able to bend  her knees.  Reflexes:  deferred  Sensory:  light touch sensation intact and symmetric throughout upper and lower extremities  Coordination:  FNF intact bilaterally   Station/Gait:  deferred      Dysphagia Screen  NA    Stroke Scales    NIHSS  Interval     Interval Comments stroke code (08/02/20 1815)   1a. Level of Consciousness 0-->Alert, keenly responsive   1b. LOC Questions 0-->Answers both questions correctly   1c. LOC Commands 0-->Performs both tasks correctly   2.   Best Gaze 0-->Normal   3.   Visual 0-->No visual loss   4.   Facial Palsy 0-->Normal symmetrical movements   5a. Motor Arm, Left 0-->No drift, limb holds 90 (or 45) degrees for full 10 secs   5b. Motor Arm, Right 0-->No drift, limb holds 90 (or 45) degrees for full 10 secs   6a. Motor Leg, Left 2-->Some effort against gravity, leg falls to bed by 5 secs, but has some effort against gravity(baseline)   6b. Motor Leg, right 2-->Some effort against gravity, leg falls to bed by 5 secs, but has some effort against gravity(baseline)   7.   Limb Ataxia 0-->Absent   8.   Sensory 0-->Normal, no sensory loss   9.   Best Language 0-->No aphasia, normal   10. Dysarthria 0-->Normal   11. Extinction and Inattention  0-->No abnormality   Total 4 (08/02/20 1815)       Imaging  I personally reviewed all imaging; relevant findings per HPI.     Lab Results Data   CBC  Recent Labs   Lab 08/01/20  0704 07/31/20  0341 07/30/20  0423   WBC 5.4 5.7 10.4   RBC 2.72* 2.82* 2.87*   HGB 7.9* 8.5* 8.4*   HCT 26.8* 30.7* 27.9*   PLT 58* 59* 72*     Basic Metabolic Panel    Recent Labs   Lab 08/02/20  0804 08/01/20  0704 07/31/20  0341    141 140   POTASSIUM 4.3 4.6 4.3   CHLORIDE 113* 114* 116*   CO2 20 18* 17*   BUN 75* 80* 84*   CR 3.06* 3.70* 3.95*   * 99 159*   LUIS ALFREDO 8.2* 8.0* 8.0*     Liver Panel  Recent Labs   Lab Test 08/02/20  0804 08/01/20  0704 07/31/20  0341   PROTTOTAL 5.7* 5.1* 5.4*   ALBUMIN 2.7* 2.5* 2.4*   BILITOTAL 1.4* 1.4* 1.0   ALKPHOS 88 78 86    AST 10 12 13   ALT 10 8 8     INR  Recent Labs   Lab Test 08/02/20  0804 07/31/20  0341 07/30/20  0423   INR 1.57* 1.57* 1.67*      Lipid ProfileNo lab results found.  A1C  Recent Labs   Lab Test 07/28/20  1650   A1C 6.4*     Troponin Omar results for input(s): TROPI in the last 168 hours.       Stroke Code / Stroke Consult Data Data   Stroke Code Data  (for stroke code without tele)  Stroke code activated 08/02/20   1732   First stroke provider response 08/02/20   1734   Last known normal         Time of discovery   (or onset of symptoms) 08/02/20       Head CT read by me         Was stroke code de-escalated? Yes 08/02/20 1729  symptoms not likely caused by stroke

## 2020-08-03 NOTE — PROGRESS NOTES
Calorie Count  Intake recorded for: 8/2  Total Kcals: 465 Total Protein: 10g  Kcals from Hospital Food: 465  Protein: 10g  Kcals from Outside Food (average):0 Protein: 0g  # Meals Recorded: 100% apple juice, 50% tomato soup  # Supplements Recorded: 100% 1 Magic Cup

## 2020-08-03 NOTE — PLAN OF CARE
A&O (lethargic) VSS on RA. PIV SL. Bladder scan 299 (ascites present) Pt had good UOP on pad. Appears to be voiding without difficulty. Low appetite. Increased back pain this shift oxycodone 2.5 mg given. Reposition Q2H. Powder applied to groin, pannus (moist).

## 2020-08-03 NOTE — CONSULTS
INTERVENTIONAL RADIOLOGY CONSULT    Analy Martin is a 70 year old female with decompensated alcoholic cirrhosis and concern for bacterial peritonitis so IR has been consulted for diagnostic and therapeutic paracentesis with a 2 L drain limit. Last time in IR was 7/31 with 3 L drained with large amount of ascites remaining.    Patient is on IR schedule for a diagnostic and therapeutic paracentesis. Consent is current.     Discussed with Chava Azevedo MD medicine.    Pedro Knight PA-C  Interventional Radiology  [phone]

## 2020-08-03 NOTE — PLAN OF CARE
/61 (BP Location: Left arm)   Pulse 70   Temp 97.3  F (36.3  C) (Axillary)   Resp 17   Ht 1.524 m (5')   Wt 84.5 kg (186 lb 4.6 oz)   SpO2 97%   BMI 36.38 kg/m      Time: 5506-6226     Reason for admission: Acute renal failure  Activity:Bedrest- mechanical lift. Frequently repositioning. Pulsate mattress in place.   Pain: Complains of back & abd PRN oxy given. Heat applied to back.  Neuro: lethargic. Slow to respond.   Cardiac: Stenosis noted. Denies chest pain   Respiratory: Denies SOB. Pt on RA.  GI/: BM x1 overnight. Voided x2.- incontinent of B&B. Bladder scanned: 360 ml.   Diet: Regular, poor po intake.    Lines: PIV- saline locked.   Wounds: Moist/fungal rash under arms, breast, groin, and pannus area- powder applied. Barrier cream applied to bottom.    Labs/Imaging: Bg monitored overnight.      New changes this shift: Pt complaining of increased back & abd pain. Pt more lethargic & having difficutly responding to orientation questions- cross cover paged. Provider assessed pt at bedside- added labs to morning. Will give scheduled lactulose in morning.      Continue to monitor and follow POC

## 2020-08-03 NOTE — PROCEDURES
[facility], [place]    Procedure: IR PARACENTESIS    Date/Time: 8/3/2020 2:37 PM  Performed by: Sachin Knight PA-C  Authorized by: Sachin Knight PA-C     UNIVERSAL PROTOCOL   Site Marked: No  Prior Images Obtained and Reviewed:  Yes  Required items: Required blood products, implants, devices and special equipment available    Patient identity confirmed:  Verbally with patient, provided demographic data, hospital-assigned identification number and arm band  NA - No sedation, light sedation, or local anesthesia  Confirmation Checklist:  Patient's identity using two indicators, relevant allergies, procedure was appropriate and matched the consent or emergent situation and correct equipment/implants were available  Time out: Immediately prior to the procedure a time out was called    Universal Protocol: the Joint Commission Universal Protocol was followed    Preparation: Patient was prepped and draped in usual sterile fashion           ANESTHESIA    Anesthesia: Local infiltration  Local Anesthetic:  Lidocaine 1% without epinephrine  Anesthetic Total (mL):  5      SEDATION    Patient Sedated: No    See dictated procedure note for full details.  Findings: Tolerated local well    Specimens: fluid and/or tissue for gram stain and culture    Complications: None    Condition: Stable    Plan: Per primary team    PROCEDURE   Patient Tolerance:  Patient tolerated the procedure well with no immediate complications  Describe Procedure: Diagnostic and therapeutic paracentesis, 2 L drained. 60 mL clear pink fluid sent for lab.  Length of time physician/provider present for 1:1 monitoring during sedation: 0

## 2020-08-03 NOTE — PROGRESS NOTES
[facility], [place]    Progress Note - Massiel 2 Service        Date of Admission:  7/28/2020    Assessment & Plan   Caryl Martin is a 70 y.o. F with past medical history of HFpEF, CVA, aortic stenosis and mitral stenosis, HTN, alcoholic cirrhosis (c/b portal hypertensive gastropathy, ascites, encephalopathy, esophageal varices, and bone marrow aplasia), DM type II, CKD stage IV with chronic anemia, morbid obesity, hypothyroidism, wheelchair-bound d/t CIDP, and depression who was admitted to [facility] on 07/28/2020 after transfer from [facility] for possible CRRT in setting of acute renal failure. At OSH, hospitalization was complicated by acute on chronic decompensated cirrhosis with bleeding esophageal varices (now s/p banding x5), septic shock, and progressive renal failure, likely provoked by ESBL UTI.    Patient has remained hemodynamically stable since arrival and transferred to floor on 07/29 with continued renal failure but improving serum creatinine. Renal felt no need for HD at this time. UOP has remained minimal. Has been continued on midodrine, per Hepatology.    Regarding EV bleed, patient completed 72hrs of octreotide gtt post-op via RIJ (removed 07/30) and has been continued on lactulose, rifaximin, and prophylactic ceftriaxone (7-day course finished 08/03). Underwent paracentesis with 3L removed by IR and albumin replacement on 07/31. Has had some continued oozing through GI tract but no cuba bleeding. Patient increasingly encephalopathic as of 08/02 evening (did not receive daytime lactulose). Stroke code was called on 08/02 evening for increased lethargy and unequal pupils per nursing; Neuro felt that suspicion for stroke low, no further workup pursued. Pending repeat paracentesis to r/o SBP and initiation of rectal lactulose.    Updates:   - To IR for repeat 2L paracentesis today, labs pending  - Continue lactulose/rifaximin for HE  - Monitor UOP, PVRs,  straight cath >400mL  - Encourage hydration and nutrition, calorie counts  - Complete 7/7 days ceftriaxone today    #Hepatic encephalopathy  #Delirium  #Ascites  #Acute on chronic decompensated EtOH cirrhosis  #Alcohol use disorder in remission  Patient was admitted to OSH on 07/15 with confusion and difficulty speaking; brain MR showed mild chronic microvascular ischemic changes within cerebral white matter and small chronic cerebellar infarcts with no acute findings. Ultimately confusion was thought to be 2/2 ESBL UTI for which she completed course of ertapenem and Flagyl. At OSH, ammonia was slightly elevated to 40 and mental status improved with lactulose. On admission, ammonia WNL and lactulose discontinued. Patient was initially able to recall details of her hospitalization with occasional difficulty but overall appears mentally very clear and able to converse easily. Currently A&Ox4. She is certainly at higher risk for delirium, however, so would maintain precautions. S/p large volume paracentesis (5L) on 07/19 and 07/24; per family, has had ~6 volodymyr over past few months. Abdominal US on 07/29 without concerning findings; showing portal HTN, splenomegaly, and diffuse gallbladder wall thickening (possible adenomyomatosis or cholesterolosis). Completed courses of ertapenem and Flagyl on 07/23. No active infection. SBP ppx with ceftriaxone given bleeding varices as above. S/p repeat para (3L) by IR with 50g albumin replacement on 07/31; fluid sent for diagnostics and negative for SBP. Has had modest PO intake. Stroke code activated at 1730 on 08/02 for unequal pupils, lethargy, and patient slumped over in chair per nursing; Neuro felt that suspicion low for stroke so no further workup pursued. Patient now increasingly encephalopathic 08/03 with ammonia 194, likely exacerbated by oozing from banded EV sites and missed lactulose doses on 08/02.  - Delirium precautions  - PRN volodymyr for comfort (always w/ diagnostic  studies except cytology +/- w/ albumin replacement) - may need regularly as outpatient   - Repeat 2L para today by IR   - Continue rifaximin and lactulose titrated to 3-4 stools/day, rectal tube and rectal lactulose order set placed  - Regular diet with Na restriction to 2000 mg daily, 1.5 mg/kg/day protein   - Calorie counts, encourage PO intake  - PT/OT    MELD-Na score: 22 at 8/3/2020  7:16 AM  MELD score: 22 at 8/3/2020  7:16 AM  Calculated from:  Serum Creatinine: 2.90 mg/dL at 8/3/2020  7:16 AM  Serum Sodium: 143 mmol/L (Rounded to 137 mmol/L) at 8/3/2020  7:16 AM  Total Bilirubin: 1.3 mg/dL at 8/3/2020  7:16 AM  INR(ratio): 1.48 at 8/3/2020  7:16 AM  Age: 70 years 4 months    #Oliguric MARCELO on CKD stage IV  Baseline Cr 2-3. At OSH, Cr 5.44 with minimal UOP on 07/28 and renal US negative for mass or hydronephrosis. MARCELO likely multifactorial (prerenal and intrinsic renal) from ATN (shock). Potassium WNL. UOP has been minimal. Per Renal, likely not HRS so albumin challenge not needed. Serum creatinine downtrending, albeit slowly. Octreotide stopped and RIJ removed 07/30. Wellington removed 08/01, some urinary retention since then.  - Nephrology consult appreciated, signed off   - No acute need for dialysis at this time   - Continue midodrine 10 mg TID  - Renal diet, avoid nephrotoxic agents  - Strict I/Os and daily wts  - Daily renal panel   - Continue PTA sodium bicarb 1300 mg BID  - Continue PTA sevelamer 1600 mg TID with meals  - Monitor UOP, PVR/bladder scan qshift, straight cath >400mL    #Acute on chronic normocytic anemia   #Esophageal varices s/p banding x5  #Hypovolemic shock, resolved  #Thrombocytopenia of chronic liver disease   At OSH, initially thought septic shock 2/2 UTI. Hgb dropped 8.4 --> 7.3 --> 6.4 at so received 3-4u pRBCs while there and another en route to [facility]. Hypovolemic shock precipitated by BRBPR and bleeding esophageal varices, which were banded x5 on 07/27. Required intermittent BP  support with Levophed at OSH (weaned off 07/29 morning). Patient reportedly had episode of hematemesis morning prior to transfer. On admission, had episode of melena on 07/28 night. Hgb has been stable while in ICU, 8.1 --> 8.1 --> 7.9. Consent for PRN transfusion obtained via phone with POA; PIV in place. Plts 58 on admission, consistent with recent baseline; improving slightly. Has not required pressors here. Finished 72hrs of octreotide gtt and RIJ removed on 07/31. Completed 7-day course of ceftriaxone on 08/03. Platelets stable in the 50s-70s. Hgb stable at 8.5 as of 08/03. Still with loose maroon stools on lactulose, still oozing a bit from EV but no cuba blood.  - Regular diet as above  - GI Hepatology consulted, signed off   - Continue ceftriaxone (7-day course) for GIB (end date today 8/3)   - Continue IV pantoprazole BID, will transition back to PO when tolerating   - Monitor stool output   - Trend plts   - Monitor Hgb BID, transfuse if <7  - Zofran and Tums PRN  - Incentive spirometry     #HFpEF  PTA on torsemide. Echo at OSH from 07/20 showed EF >75%, sclerotic mitral valve with trace MR. Elevated EF c/w high-output heart failure in setting of cirrhosis with ascites. Was continued on torsemide and started on spironolactone at OSH, but both were later held 2/2 hypotension requiring pressor support (weaned 07/28 morning).  - Hold PTA torsemide for now     #IDDM  PTA on insulin glargine 14u QHS. Well-controlled, A1c 5.3% on 07/15. At OSH, was on medium SSI and 7u glargine QHS.   Recent Labs   Lab 08/03/20  1223 08/03/20  0716 08/03/20  0144 08/02/20  2125 08/02/20  1709 08/02/20  1246 08/02/20  0914 08/02/20  0804  08/01/20  0704  07/31/20  0341  07/30/20  0423  07/29/20  0420   GLC  --  191*  --   --   --   --   --  174*  --  99  --  159*  --  178*  --  232*   *  --  193* 192* 156* 165* 158*  --    < >  --    < >  --    < >  --    < >  --     < > = values in this interval not displayed.   -  Continue medium dose SSI  - Continue home dose glargine 12u at bedtime  - Hypoglycemia protocol      #Hypothyroidism   PTA on levothyroxine. TSH 16.58, free T4 0.78 @ OSH.   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 At OSH, received hydrocortisone 50mg Q6H for stress dosing in setting of hypotension. Weaned stress dosed hydrocortisone as patient has been normotensive. No RLS symptoms reported since admission.  - Continue home dose of 5 mg prednisone daily   - Continue PTA gabapentin and pramipexole     #Bone mineral disorder  #Osteoarthritis  PTA on sevelamer carbonate for BMD. OA longstanding, primarily in shoulders and right knee. Patient does not want APAP. Regarding BMD, Ca and P at goal. Having increased pain after therapies.  - Continue sevelamer  - Pain management: topical lidocaine and low-dose oxycodone  - PT/OT    #Intertrigo  Rash diffusely under arms, breasts, pannus, and groin per nursing.  - Micatin powder PRN     #Gout  - Hold PTA allopurinol      Diet: <2 g, 1.5mg/kg/day protein diet  Fluids: None  Lines: L PIV  DVT Prophylaxis: Pneumatic Compression Devices  Wellington Catheter: not present  Code Status: DNR/DNI       Disposition Plan   Expected discharge: 2-4 days, recommended to prior living arrangement (TCU) once improvement in HE and mental status, continued stable renal function, and TCU placement established.  Entered: Edilma Roque 08/03/2020, 12:53 PM     The patient's care was discussed with the Primary team.    Edilma Roque, MS4  Medicine - [place] 2  Pager [phone]    Resident/Fellow Attestation   I, Chava Azevedo, was present with the medical student who participated in the service and in the documentation of the note.  I have verified the history and personally performed the physical exam and medical decision making.  I agree with the assessment and plan of care as documented in the note.      This  is a shared note, my edits are incorporated.    Chava Azevedo MD  PGY1  Date of Service (when I saw the patient): 08/03/20    ______________________________________________________________________    Interval History   Yesterday, stroke code was called at 1730 yesterday evening for unequal pupils and increased lethargy. Stat Neuro consult ordered and felt low concern for stroke so no further intervention pursued. Per nursing, had one loose stool overnight 08/02-08/03.    This morning, patient is encephalopathic with minimal ability to converse and follow commands. Asterixis present. Hemodynamically stable.    Data reviewed today: I reviewed all medications, new labs and imaging results over the last 24 hours. I personally reviewed new labs.    Physical Exam   Vital Signs: Temp: 97.3  F (36.3  C) Temp src: Axillary BP: 112/49 Pulse: 82 Heart Rate: 82 Resp: 16 SpO2: 98 % O2 Device: None (Room air)    Weight: 186 lbs 4.62 oz  General: Sleeping but arousable to voice, minimal interaction. Appears uncomfortable  HEENT: Dry mucous membranes, bilateral submandibular swelling  CV: Regular rate and rhythm, normal S1S2, blowing 3/6 holosystolic murmur, no clicks or rubs  Resp: Clear to auscultation of anterior apices  Abd: Firm, distended without fluid wave, tympanic, diffuse mild tendernes to palpation. Large midline hernia and subumbilical healed surgical scar. Bedside ultrasound revealed multiple large pocket of intra-abdominal fluid  : Incontinent of urine  Extremities: Radial pulses intact and symmetric, 1+ pedal edema  Skin: Well-perfused, chronic venous stasis changes to BLEs  Neuro: Encephalopathic. Follows some simple commands intermittently,  unable to answer most questions. Asterixis present.     Data   Recent Labs   Lab 08/03/20  0716 08/02/20  0804 08/01/20  0704 07/31/20  0341   WBC 5.8  --  5.4 5.7   HGB 8.5*  --  7.9* 8.5*     --  99 109*   PLT 66*  --  58* 59*   INR 1.48* 1.57*  --  1.57*     140 141 140   POTASSIUM 4.7 4.3 4.6 4.3   CHLORIDE 116* 113* 114* 116*   CO2 20 20 18* 17*   BUN 67* 75* 80* 84*   CR 2.90* 3.06* 3.70* 3.95*   ANIONGAP 7 7 8 7   LUIS ALFREDO 8.4* 8.2* 8.0* 8.0*   * 174* 99 159*   ALBUMIN 2.7* 2.7* 2.5* 2.4*   PROTTOTAL 5.8* 5.7* 5.1* 5.4*   BILITOTAL 1.3 1.4* 1.4* 1.0   ALKPHOS 102 88 78 86   ALT 10 10 8 8   AST 11 10 12 13

## 2020-08-03 NOTE — PLAN OF CARE
/45 (BP Location: Left arm)   Pulse 70   Temp 96.9  F (36.1  C) (Axillary)   Resp 20   Ht 1.524 m (5')   Wt 95.7 kg (210 lb 15.7 oz)   SpO2 96%   BMI 41.20 kg/m      Time: 8416-5490     Reason for admission:   Acute renal failure, liver failure  Decompensated hepatic cirrhosis (H)   Activity: Ax2, mechanical lift utilized and q2h repos provided  Pain: Pt reported generalized pain and discomfort especially in her back and neck, PRN oxy administered x1 and lidocaine patch in place  Neuro: WDL this AM but around 1700 pt had stroke code called due to stroke-like symptoms, see note. Neurology came to assess but was not concerned and no CT was needed. Pt condition normalized as PM went on, pupils remain unequal.  Cardiac: WDL murmur reported  Respiratory: Dyspnea on exertion, dry cough   GI/: Rounded/distended stomach w/ one small BM, no lactulose given pt met stool goal early this AM. Pt had one small void in brief today, bladder scanned q4h. No need for straight cath this shift--orders are for values over 400 mL.  Diet:  2g Na+ diet, poor PO intake. RN tried to encourage pt to eat and drink but she was feeling unwell today. She did only had 1-2 bites of her meals and drank 1/2 of boost and 1 magic cup.  Lines: PIV SL  Skin/Wounds: Rash in groin, blanchable redness on bottom and scattered bruising throughout.   Labs: Creat 3.06, blood sugars stable       Plan: Continue to monitor and follow POC

## 2020-08-03 NOTE — PLAN OF CARE
Patient lethargic, unable to take pills or follow directions this am.  Requested to be started on [place] Encephalopathy Scale.  Scored a 3.  1st dose given at 1045, via enema.  2nd dose given at 1:30, orally.  Patients mental status improving slightly, still difficult to take pills.  2 large bowel movements today.  Also voided incontinent-3 times.  Bladder scanned just prior to leaving for paracentisis, at 320 ml.  Recheck after next void.  Straight cath as necessary. Patient went to IR at 2pm for a paracentisis. Back on unit at 3pm.  Glucoses today 194, 150.  Did not eat but a couple bites of applesauce due to mental status.   Drank scheduled lactulose dissolved in water.  Plan for IVF and Albumin this evening.

## 2020-08-03 NOTE — IR NOTE
Patient Name: Analy Martin  Medical Record Number: [id number]  Today's Date: 8/3/2020    Procedure: Paracentesis  Proceduralist: Pedro Knight PA-C    Procedure Start: 1413  Procedure end: 1436  Sedation medications administered: NA     Report given to: BERE Moeller 5A  : DARLENE    Other Notes: Pt arrived to IR room 7 from . Consent reviewed. Pt denies any questions or concerns regarding procedure. Pt positioned supine and monitored per protocol. Pt tolerated procedure without any noted complications. Pt transferred back to .    2 L ascites removed.  Labs sent.

## 2020-08-03 NOTE — PLAN OF CARE
OT: per discussion with RN, pt not appropriate this AM as not following commands. Will check back as schedule allows.

## 2020-08-04 NOTE — PROGRESS NOTES
Social Work Services Progress Note    Hospital Day: 8  Date of Initial Social Work Evaluation:  7/31/20  Collaborated with:  Primary team Massiel Benito, pt , pt's daughter in law Iraida (ph [phone])    Data:  Pt is a 70-year-old female admitted to [facility] 7/28/20 from OSH for possible CRRT in setting of acute renal failure. TCU is recommended at discharge. Pt may be medically stable for discharge in 1-2 days.     Intervention:  Met with pt to discuss discharge planning. Pt is in agreement with rehab stay. Discussed that pt previously reported she was interested in going to [facility]. Pt requests SW call her dtr-in-law Iraida to discuss discharge location.    Spoke with pt's dtr-in-law Iraida. Iraida reports pt has been to [facility] in the past and would like pt to return there. Discussed that referral will be sent.    Pt will require insurance authorization from [facility] prior to d/c.    Faxed referral to:  1) [facility] (ph [phone])    Assessment:  Pursuing TCU placement    Plan:    Anticipated Disposition:  Facility:  TCU    Barriers to d/c plan:  Medical stability    Follow Up:  SW to follow for discharge planning    ALANNAH De Jesus, [facility] Care[facility]ABRIZIO    [facility]  Pager [phone]  Phone [phone]

## 2020-08-04 NOTE — PLAN OF CARE
5A    Discharge Planner PT   Patient plan for discharge: TCU  Current status: Pt performed rolling with Mod A for positioning of sling. Pt dependent for lift transfer from bed to recliner. Tried sit<>stand from recliner with Max A x 2, pt unable to stand. Pt unable to reposition self in chair IND, lift used for reposition. Pt instructed in seated LE strengthening exercises, tolerated well. VSS throughout on RA.  Barriers to return to prior living situation: Medical condition, limited functional mobility  Recommendations for discharge: TCU  Rationale for recommendations: Pt current below baseline level of function and unsafe to return home. Will benefit from additional PT services to increase functional mobility and strength.        Entered by: Bret Guo 08/04/2020 4:19 PM        Recommend staff use ceiling lift and sling for transfers.

## 2020-08-04 NOTE — PROGRESS NOTES
[facility], [place]    Progress Note - [place] 2 Service        Date of Admission:  7/28/2020    Assessment & Plan   Caryl Martin is a 70 y.o. F with past medical history of HFpEF, CVA, aortic stenosis and mitral stenosis, HTN, alcoholic cirrhosis (c/b portal hypertensive gastropathy, ascites, encephalopathy, esophageal varices, and bone marrow aplasia), DM type II, CKD stage IV with chronic anemia, morbid obesity, hypothyroidism, wheelchair-bound d/t CIDP, and depression who was admitted to [facility] MICU on 07/28/2020 after transfer from [facility] for possible CRRT in setting of acute renal failure. At OSH, hospitalization was complicated by acute on chronic decompensated cirrhosis with bleeding esophageal varices (now s/p banding x5), septic shock, and progressive renal failure, likely provoked by ESBL UTI.    Patient has remained hemodynamically stable since arrival and transferred to floor on 07/29 with continued renal failure but improving serum creatinine. Renal felt no need for HD at this time. UOP has remained minimal. Has been continued on midodrine, per Hepatology.    Regarding EV bleed, patient completed 72hrs of octreotide gtt post-op via RIJ (removed 07/30) and has been continued on lactulose, rifaximin, and prophylactic ceftriaxone (7-day course finished 08/03). Underwent paracentesis with 3L removed by IR and albumin replacement on 07/31. Has had some continued oozing through GI tract but no cuba bleeding. Patient increasingly encephalopathic as of 08/02 evening (did not receive daytime lactulose). Stroke code was called on 08/02 evening for increased lethargy and unequal pupils per nursing; Neuro felt that suspicion for stroke low, no further workup pursued. Repeat 2L paracentesis on 08/03 negative for SBP and rectal lactulose initiated. Completed 7-day course of ceftriaxone on 08/03.    Updates:   - Urine cx pending  - Continue lactulose/rifaximin for HE  -  Monitor UOP, PVRs, straight cath >400mL  - Encourage hydration and nutrition, calorie counts    #Hepatic encephalopathy  #Delirium  #Ascites  #Acute on chronic decompensated EtOH cirrhosis  #Alcohol use disorder in remission  Patient was admitted to OSH on 07/15 with confusion and difficulty speaking; brain MR showed mild chronic microvascular ischemic changes within cerebral white matter and small chronic cerebellar infarcts with no acute findings. Ultimately confusion was thought to be 2/2 ESBL UTI for which she completed course of ertapenem and Flagyl. At OSH, ammonia was slightly elevated to 40 and mental status improved with lactulose. On admission, ammonia WNL and lactulose discontinued. Patient was initially able to recall details of her hospitalization with occasional difficulty but overall appears mentally very clear and able to converse easily. Currently A&Ox4. She is certainly at higher risk for delirium, however, so would maintain precautions. S/p large volume paracentesis (5L) on 07/19 and 07/24; per family, has had ~6 volodymyr over past few months. Abdominal US on 07/29 without concerning findings; showing portal HTN, splenomegaly, and diffuse gallbladder wall thickening (possible adenomyomatosis or cholesterolosis). Completed courses of ertapenem and Flagyl on 07/23. No active infection. SBP ppx with ceftriaxone given bleeding varices as above. S/p repeat para (3L) by IR with 50g albumin replacement on 07/31; fluid sent for diagnostics and negative for SBP. Has had modest PO intake. Stroke code activated at 1730 on 08/02 for unequal pupils, lethargy, and patient slumped over in chair per nursing; Neuro felt that suspicion low for stroke so no further workup pursued. Patient now increasingly encephalopathic 08/03 with ammonia 194, likely exacerbated by oozing from banded EV sites and missed lactulose doses on 08/02. Repeat 2L para on 08/03 negative for SBP, albumin replacement given. Finished 7-day course  of ceftriaxone on 08/03.   - Delirium precautions  - PRN volodymyr for comfort (always w/ diagnostic studies except cytology +/- w/ albumin replacement) - may need regularly as outpatient  - Continue rifaximin and lactulose titrated to 3-4 stools/day (if unable to tolerate PO, via rectal tube)  - Regular diet with Na restriction to 2000 mg daily, 1.5 mg/kg/day protein   - Calorie counts, encourage PO intake  - PT/OT    MELD-Na score: 22 at 8/4/2020  7:21 AM  MELD score: 22 at 8/4/2020  7:21 AM  Calculated from:  Serum Creatinine: 2.65 mg/dL at 8/4/2020  7:21 AM  Serum Sodium: 149 mmol/L (Rounded to 137 mmol/L) at 8/4/2020  7:21 AM  Total Bilirubin: 1.4 mg/dL at 8/4/2020  7:21 AM  INR(ratio): 1.55 at 8/4/2020  7:21 AM  Age: 70 years 4 months     #Possible UTI  Patient now endorsing sharp/shooting pains in perineal region and 08/04 UA concerning for UTI. Was initially admitted to OSH for ESBL UTI, which provoked ARF and HE.  - Await urine culture    #Hypernatremia  #Oliguric MARCELO on CKD stage IV  Baseline Cr 2-3. At OSH, Cr 5.44 with minimal UOP on 07/28 and renal US negative for mass or hydronephrosis. MARCELO likely multifactorial (prerenal and intrinsic renal) from ATN (shock). Potassium WNL. UOP has been minimal. Per Renal, likely not HRS so albumin challenge not needed. Serum creatinine downtrending, albeit slowly. Octreotide stopped and RIJ removed 07/30. Wellington removed 08/01, some urinary retention since then. Na up to 149 on 08/04.   - Nephrology consult appreciated, signed off   - No acute need for dialysis at this time   - Continue midodrine 10 mg TID  - Renal diet, avoid nephrotoxic agents  - Strict I/Os and daily wts  - Daily renal panel   - Continue PTA sodium bicarb 1300 mg BID  - Continue PTA sevelamer 1600 mg TID with meals  - Monitor UOP, PVR/bladder scan qshift, straight cath >400mL    #Acute on chronic normocytic anemia   #Esophageal varices s/p banding x5  #Hypovolemic shock, resolved  #Thrombocytopenia of  chronic liver disease   At OSH, initially thought septic shock 2/2 UTI. Hgb dropped 8.4 --> 7.3 --> 6.4 at so received 3-4u pRBCs while there and another en route to [facility]. Hypovolemic shock precipitated by BRBPR and bleeding esophageal varices, which were banded x5 on 07/27. Required intermittent BP support with Levophed at OSH (weaned off 07/29 morning). Patient reportedly had episode of hematemesis morning prior to transfer. On admission, had episode of melena on 07/28 night. Hgb has been stable while in ICU, 8.1 --> 8.1 --> 7.9. Consent for PRN transfusion obtained via phone with POA; PIV in place. Plts 58 on admission, consistent with recent baseline; improving slightly. Has not required pressors here. Finished 72hrs of octreotide gtt and RIJ removed on 07/31. Completed 7-day course of ceftriaxone on 08/03. Platelets stable in the 50s-70s. Hgb stable at ~8.5. Still with loose maroon stools on lactulose, still oozing a bit from EV but no cuba blood. 7-day course of ceftriaxone completed 08/03.  - Regular diet as above  - GI Hepatology consulted, signed off   - Transition to PO pantoprazole BID   - Monitor stool output   - Trend plts   - Monitor Hgb BID, transfuse if <7  - Zofran and Tums PRN  - Incentive spirometry     #HFpEF  PTA on torsemide. Echo at OSH from 07/20 showed EF >75%, sclerotic mitral valve with trace MR. Elevated EF c/w high-output heart failure in setting of cirrhosis with ascites. Was continued on torsemide and started on spironolactone at OSH, but both were later held 2/2 hypotension requiring pressor support (weaned 07/28 morning).  - Hold PTA torsemide for now     #IDDM  PTA on insulin glargine 14u QHS. Well-controlled, A1c 5.3% on 07/15. At OSH, was on medium SSI and 7u glargine QHS.   Recent Labs   Lab 08/04/20  1623 08/04/20  1042 08/04/20  0721 08/04/20  0202 08/03/20  2107 08/03/20  1954 08/03/20  1858 08/03/20  1223 08/03/20  0716  08/02/20  0804  08/01/20  0704  07/31/20  0341   GLC   --   --  126*  --   --  145*  --   --  191*  --  174*  --  99  --  159*   * 104*  --  146* 134*  --  132* 150*  --    < >  --    < >  --    < >  --     < > = values in this interval not displayed.   - Continue medium dose SSI  - Continue home dose glargine 12u at bedtime  - Hypoglycemia protocol      #Hypothyroidism   PTA on levothyroxine. TSH 16.58, free T4 0.78 @ OSH.   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 At OSH, received hydrocortisone 50mg Q6H for stress dosing in setting of hypotension. Weaned stress dosed hydrocortisone as patient has been normotensive. No RLS symptoms reported since admission.  - Continue home dose of 5 mg prednisone daily   - Continue PTA gabapentin and pramipexole     #Bone mineral disorder  #Osteoarthritis  PTA on sevelamer carbonate for BMD. OA longstanding, primarily in shoulders and right knee. Patient does not want APAP. Regarding BMD, Ca and P at goal. Having increased pain after therapies.  - Continue sevelamer  - Pain management: topical lidocaine and low-dose oxycodone  - PT/OT    #Intertrigo  Rash diffusely under arms, breasts, pannus, and groin per nursing.  - Micatin powder PRN     #Gout  - Hold PTA allopurinol      Diet: <2 g, 1.5mg/kg/day protein diet  Fluids: None  Lines: L PIV  DVT Prophylaxis: Pneumatic Compression Devices  Wellington Catheter: not present  Code Status: DNR/DNI       Disposition Plan   Expected discharge: 1-3 days, recommended to TCU once improvement in HE and mental status, continued stable renal function, and TCU placement established.  Entered: Edilma Roque 08/04/2020, 5:25 PM     The patient's care was discussed with the Primary team.    Edilma Roque, MS4  Medicine - [place] 2  Pager [phone]    Resident/Fellow Attestation   I, Chava Azevedo, was present with the medical student who participated in the service and in the documentation of the note.  I  have verified the history and personally performed the physical exam and medical decision making.  I agree with the assessment and plan of care as documented in the note.      This is a shared note, my edits are incorporated.     Chava Azevedo MD  PGY1  Date of Service (when I saw the patient): 08/04/20    ______________________________________________________________________    Interval History   No acute events overnight. Patient complained of back pain and new vaginal pain; UA drawn and received IV Dilaudid 0.2mg x2 and oxycodone 2.5mg x1. Was intermittently alert and partially oriented in the evening and overnight.    This morning, patient is drowsy but able to converse. Endorses back pain and new sharp, shooting vaginal pain over the past several days, which she has never experienced before. Complains of nausea and some abdominal discomfort. Patient aware that she is confused.    Medical student Edilma spoke with Iraida on the phone with patient update today.    Data reviewed today: I reviewed all medications, new labs and imaging results over the last 24 hours. I personally reviewed new labs.    Physical Exam   Vital Signs: Temp: 97.2  F (36.2  C) Temp src: Oral BP: (!) 140/66 Pulse: 73 Heart Rate: 90 Resp: 18 SpO2: 95 % O2 Device: None (Room air)    Weight: 186 lbs 4.62 oz  General: Sleeping but arousable to voice, drowsy. Appears uncomfortable  HEENT: Moist mucous membranes, bilateral submandibular swelling  CV: Regular rate and rhythm, blowing 3/6 holosystolic murmur, no clicks or rubs  Resp: Clear to auscultation bilaterally  Abd: Firm, distended without fluid wave, +BS, mild tenderness to palpation of RUQ/LUQ. Large midline hernia and subumbilical healed surgical scar  : Incontinent of urine, vaginal exam deferred until tomorrow  Extremities: 1+ pedal edema  Skin: Dry flaking skin over feet. Chronic venous stasis changes to BLEs  Neuro: Mildly encephalopathic but improved. Follows commands, oriented to  self, place, and year. Asterixis absent     Data   Recent Labs   Lab 08/04/20  0721 08/03/20  1954 08/03/20  0716 08/02/20  0804 08/01/20  0704   WBC 5.3  --  5.8  --  5.4   HGB 8.7*  --  8.5*  --  7.9*   *  --  100  --  99   PLT 60*  --  66*  --  58*   INR 1.55*  --  1.48* 1.57*  --    * 145* 143 140 141   POTASSIUM 4.2 4.4 4.7 4.3 4.6   CHLORIDE 122* 118* 116* 113* 114*   CO2 20 21 20 20 18*   BUN 56* 64* 67* 75* 80*   CR 2.65* 2.79* 2.90* 3.06* 3.70*   ANIONGAP 7 6 7 7 8   LUIS ALFREDO 8.6 8.5 8.4* 8.2* 8.0*   * 145* 191* 174* 99   ALBUMIN 3.1*  --  2.7* 2.7* 2.5*   PROTTOTAL 6.3*  --  5.8* 5.7* 5.1*   BILITOTAL 1.4*  --  1.3 1.4* 1.4*   ALKPHOS 111  --  102 88 78   ALT 10  --  10 10 8   AST 10  --  11 10 12

## 2020-08-04 NOTE — PROGRESS NOTES
CLINICAL NUTRITION SERVICES - ASSESSMENT NOTE     Nutrition Prescription    RECOMMENDATIONS FOR MDs/PROVIDERS TO ORDER:  --Recommend a MVI with minerals and consider 100 mg thiamine daily.  --Recommend placing NJT and starting EN to supplement intake. If EN becomes POC, please consult RD to order TF per MNT guidelines.     Malnutrition Status:    Severe malnutrition in the context of acute on chronic illness    Recommendations already ordered by Registered Dietitian (RD):  Adjusting supplements for pt to try: Boost Plus, Gelatein Plus, Magic Cup, Boost Shake  Continue Kcal counts    Future/Additional Recommendations:  Monitor PO intake, weight trends.POC    If EN:  Nutren 1.5 @ 45 mL/hr to provide 1620 kcals (30 kcal/kg/day), 73 g PRO (1.3 g/kg/day), 821 mL H2O, 190 g CHO and no Fiber daily.  --Start TF @ 15 ml/hr and advance by 10 ml q 8 hrs until goal rate.  --Do not start or advance TF unless K+/Mg++ >/= WNL and Phos > 1.9  --Certavite daily, 100 mg thiamine daily  --Minimum 30 ml q 4 hrs water flushes for tube patency, additional as per MD       REASON FOR ASSESSMENT  Analy Martin is a/an 70 year old female assessed by the dietitian for LOS    NUTRITION HISTORY  PMH IDDM c/b CKD stage IV, alcoholic cirrhosis with ascites, HE, and EV, HFpEF, wheelchair bound, HTN, etoh abuse (in remission), large volume paracentesis (5L on 7/19 and 7/24), hospitalized at OSH with acute on chronic decompensated cirrhosis likely provoked by ESBL UTI transferred to ICU on 7/28/20 for worsening renal function, concern for possible need for continuous dialysis.    PROCEDURES & IMAGING:   At OSH: Large EVs banded on 7/27 (5 bands) at [place].     CURRENT NUTRITION ORDERS  Diet: 2 g Sodium, Boost Plus TID between meals  Intake/Tolerance: Poor po intake    Kcal counts ordered by team:  8/2         Total Kcals: 465       Total Protein: 10g  *100% magic cup  8/3         Total Kcals: 0           Total Protein: 0g    LABS  Na 149 (H <-  "145 <- 143)  Ammonia 194 (H)  -196  A1c 6.4%    MEDICATIONS  Medium sliding scale insulin  Lantus 12 units q HS  Lactulose (+BM 8/4)  Synthroid  Prednisone  Rifaximin  Renvela (last Phos 3.1 on 7/31 and 7/28)  Vit D3 (50 mcg)    ANTHROPOMETRICS  Height: 152.4 cm (5' 0\")  Most Recent Weight: 84.5 kg (186 lb 4.6 oz)    IBW: 45.5 kg (186% IBW)  BMI: Obesity Grade II BMI 35-39.9  Weight History: Paracentesis: 8/3: 2L removed  9% wt loss x 7 days - confounded by fluid, though still considered severe.  Wt Readings from Last 10 Encounters:   08/03/20 84.5 kg (186 lb 4.6 oz)     08/03/20 0653  84.5 kg (186 lb 4.6 oz)     07/31/20 0400  95.7 kg (210 lb 15.7 oz)     07/29/20 0400  94.2 kg (207 lb 10.8 oz)     07/28/20 1300  92.6 kg (204 lb 2.3 oz)       Dosing Weight: 55 kg adjusted based on IBW and driest weight this admission 84.5 kg    ASSESSED NUTRITION NEEDS  Estimated Energy Needs: 1792-7215+ kcals/day (25 - 30+ kcals/kg)  Justification: Repletion  Estimated Protein Needs: 66-82 grams protein/day (1.2 - 1.5 grams of pro/kg)  Justification: Repletion  Estimated Fluid Needs: (1 mL/kcal)   Justification: Maintenance and Per provider pending fluid status    PHYSICAL FINDINGS  Ascites   Unable to obtain in-person nutrition history or nutrition focused physical assessment (NFPA) from patient as the number of staff going into rooms is restricted to limit exposure and to minimize use of PPE.    MALNUTRITION  % Intake: < 75% for > 7 days (non-severe)  % Weight Loss: > 2% in 1 week (severe)  Subcutaneous Fat Loss: Unable to assess  Muscle Loss: Unable to assess  Fluid Accumulation/Edema: 2+ mild edema  Malnutrition Diagnosis: Severe malnutrition in the context of acute on chronic illness    NUTRITION DIAGNOSIS  Inadequate oral intake related to poor appetite, pain, AMS as evidenced by PO intake documented < 75% x 7 days with 2 day Kcal counts ~230 kcal and 5 g pro, and significant 9% wt loss x 7 days (some of which is " confounded by fluid)     INTERVENTIONS  Implementation  Nutrition Education: Not appropriate at this time due to patient condition   Enteral Nutrition - recs  Medical food supplement therapy     Goals  Patient to consume % of nutritionally adequate meal trays TID, or the equivalent with supplements/snacks.     Monitoring/Evaluation  Progress toward goals will be monitored and evaluated per protocol.    Edilma Newell MS, RD, LD, [facility]  Pager [phone]

## 2020-08-04 NOTE — PLAN OF CARE
Patient alert to self, place, and situation.  Does not use call light appropriatly.   Mentation is alert, conversational, and at times, witty. Mood is pleasant and cooperative.  3 large bowel movements this shift, watery, incontinent.  UA/UC collected via straight cath and sent to lab.  Patient had 700 ml output at this time.  Will need to bladder scan at least once per shift to assure patient is emptying.  Skin in perineum is blanchable redness, will needs to continue to be diligent with skin cares.

## 2020-08-04 NOTE — PLAN OF CARE
BP (!) 111/37 (BP Location: Left arm)   Pulse 76   Temp 96.9  F (36.1  C) (Oral)   Resp 18   Ht 1.524 m (5')   Wt 84.5 kg (186 lb 4.6 oz)   SpO2 97%   BMI 36.38 kg/m      Time: 9976-3211     Reason for admission: Acute renal failure  Activity:Bedrest- mechanical lift. Frequently repositioning. Pulsate mattress in place.   Pain: Complains of back, abd, and vaginal pain- PRN oxy given & 2 time dose of dilaudid given. Heat applied to back & repo q 2hr   Neuro: Disoriented to place & pt unable to correctly remember year of birth date.    Cardiac: Stenosis noted. Denies chest pain   Respiratory: Denies SOB. Pt on RA.  GI/: BM x5 overnight w/ mixed urine output.- incontinent of B&B. Bladder scanned: 377- need UA.   Diet: Regular, poor po intake. Sips of liquid overnight.     Lines: PIV- saline locked.   Wounds: Moist/fungal rash under arms, breast, groin, and pannus area- powder applied. Barrier cream applied to bottom. Bruising throughout extremities.   Labs/Imaging: Bg monitored overnight. Ammonia: 194     New changes this shift: Pt complaining of increased back & abd pain. Team paged- ordered one time dose of IV 0.2 mg of dilaudid w/ relief. PRN lactulose given x1- Pt on [place] protocol. Pt refusing rectal tube- team made aware.     Pt complaining of increased back pain & vaginal pain- Cross cover paged. Provider ordered UA to be collected & one time dose of IV 0.2mg of dilaudid. Pt states IV dilaudid is giving relief. Will need to straight cath for UA- pt having mixed stools & urine.      Continue to monitor and follow POC

## 2020-08-04 NOTE — PLAN OF CARE
BP (!) 140/66 (BP Location: Left arm)   Pulse 73   Temp 97.2  F (36.2  C) (Oral)   Resp 18   Ht 1.524 m (5')   Wt 84.5 kg (186 lb 4.6 oz)   SpO2 95%   BMI 36.38 kg/m       9949-4374  Pt is now A/Ox4. Forgetful at times. VSS on RA. Incontinent of urine and stool. Bladder scanned for 233 mL at 1630. 2g Na diet, poor appetite-on eriberto counts. Tums given x1 for c/o heartburn. No new skin deficits noted. PIV saline locked. Up assist of 2 with lift. Up in recliner most of evening and declined to get back to bed at this time. Oxycodone given x1 for c/o headache and generalized pain. Continue to monitor and follow POC. Notify MD with any changes or concerns

## 2020-08-04 NOTE — PLAN OF CARE
5A OT    Discharge Planner OT   Patient plan for discharge: TCU  Current status: Pt supine, agreeable to therapy with encouragement. Min Ax2 supine > seated EOB with extended time. Pt sat EOB x10 min with SBA-CGA. Pt reporting dizziness and unsure when she last sat EOB. Pt attempted sit > stand x3 with Max Ax2 and no AE, cues for sequencing. Pt unable to complete due to weakness. Seated EOB > supine with Max Ax1 to lift legs and CGA x1 to guide shoulders. Boosted toward HOB with Total Ax2. Pt reporting frustration with medical course and weakness. Therapist provided listening, validation of concerns, and encouragement. Recommend pt sit in bedside chair to promote strength and activity tolerance, use lift and Ax2 to transfer.   Barriers to return to prior living situation: Medical status, weakness, decreased I with transfers, decreased I with ADLs.   Recommendations for discharge: TCU  Rationale for recommendations: Pt significantly below functional baseline and unable to safely return to ILF at this time.        Entered by: Lidia Ferraro 08/04/2020 10:43 AM

## 2020-08-04 NOTE — PROGRESS NOTES
Calorie Count  Intake recorded for: 8/3  Total Kcals: 0 Total Protein: 0g  Kcals from Hospital Food: 0   Protein: 0g  Kcals from Outside Food (average):0 Protein: 0g  # Meals Recorded: 1 meal ordered from kitchen, no intake recorded.   # Supplements Recorded: no intake recorded.

## 2020-08-04 NOTE — PLAN OF CARE
[id number]    Pt disoriented x4 at start of shift PRN lactulose given 3 large watery stool this shift. Pt intermittent oriented x3 to self and place and situation. Reported back pain PRN Oxycodone given x1. Poor appetite supplemental boost encouraged. 1000 mL bolus started. Able to swallow pills one at a time well. Bladder scanned 168 mL.  and 132 no Insulin given.     BP (!) 115/31 (BP Location: Left arm)   Pulse 76   Temp 97.1  F (36.2  C) (Oral)   Resp 16   Ht 1.524 m (5')   Wt 84.5 kg (186 lb 4.6 oz)   SpO2 98%   BMI 36.38 kg/m      Plan to follow POC

## 2020-08-05 NOTE — PROGRESS NOTES
Calorie Count  Intake recorded for: 8/4  Total Kcals: 200 Total Protein: 0g  Kcals from Hospital Food: 200  Protein: 0g  Kcals from Outside Food (average):0 Protein: 0g  # Meals Recorded: 100% 2 ginger ales  # Supplements Recorded: 0

## 2020-08-05 NOTE — PLAN OF CARE
Discharge Planner OT   Patient plan for discharge: Rehab  Current status: Pt needing total assist for all functional mobility and assist for all dressing and toileting.   Barriers to return to prior living situation: Lives alone, level of assist needed, impaired mobility.   Recommendations for discharge: TCU  Rationale for recommendations: Pt is significantly below baseline level of function and will benefit from continued OT to progress level of independence.        Entered by: Elvia Mojica 08/05/2020 3:42 PM

## 2020-08-05 NOTE — PROGRESS NOTES
CLINICAL NUTRITION SERVICES - BRIEF NOTE      Nutrition Prescription     RECOMMENDATIONS FOR MDs/PROVIDERS TO ORDER:  Strongly recommend feeding tube placement for EN to meet needs, as pt consumed on average 223 kcal and 3 g pro over 3 days.        --If EN becomes POC, please place consult for RD to order TF per MNT guidelines.     Recommend a MVI with minerals, folic acid (1 mg), and 100 mg thiamine given history of etoh cirrhosis and severe malnutrition     Recommendations already ordered by Registered Dietitian (RD):  Continue supplements as ordered.     Future/Additional Recommendations:  If EN becomes POC:  Nutren 1.5 @ 45 mL/hr to provide 1620 kcals (30 kcal/kg/day), 73 g PRO (1.3 g/kg/day), 821 mL H2O, 190 g CHO and no Fiber daily.  --Start TF @ 15 ml/hr and advance by 10 ml q 8 hrs until goal rate.  --Do not start or advance TF unless K+/Mg++ >/= WNL and Phos > 1.9  --Certavite daily, 100 mg thiamine daily  --Minimum 30 ml q 4 hrs water flushes for tube patency, additional as per MD    *Please see full assessment note from 8/4/2020    New Findings:  Kcal counts ordered by team:  8/2         Total Kcals: 465       Total Protein: 10g  *100% magic cup  8/3         Total Kcals: 0           Total Protein: 0g  8/4         Total Kcals: 200       Total Protein: 0g  Average daily intake ~223 kcal, 3 g pro    Paged team x 2 with strong recommendations for feeding tube placement and EN. Recommended consult for RD to order TF per MNT guidelines if agreeable to POC.     Interventions  Collaboration with other providers - paged team with recs  Enteral Nutrition - recs  Medical food supplement therapy    RD to follow per protocol.    Edilma Newell, MS, RD, LD, [facility]  Pager [phone]

## 2020-08-05 NOTE — PROGRESS NOTES
[facility], [place]    Progress Note - Massiel 2 Service        Date of Admission:  7/28/2020    Assessment & Plan   Caryl Martin is a 70 y.o. F with past medical history of HFpEF, CVA, aortic stenosis and mitral stenosis, HTN, alcoholic cirrhosis (c/b portal hypertensive gastropathy, ascites, encephalopathy, esophageal varices, and bone marrow aplasia), DM type II, CKD stage IV with chronic anemia, morbid obesity, hypothyroidism, wheelchair-bound d/t CIDP, and depression who was admitted to [facility] on 07/28/2020 after transfer from [facility] for possible CRRT in setting of acute renal failure. At OSH, hospitalization was complicated by acute on chronic decompensated cirrhosis with bleeding esophageal varices (now s/p banding x5), septic shock, and progressive renal failure, likely provoked by ESBL UTI.    Patient has remained hemodynamically stable since arrival and transferred to floor on 07/29 with continued renal failure but improving serum creatinine. Renal felt no need for HD at this time. UOP has remained minimal. Has been continued on midodrine, per Hepatology.    Regarding EV bleed, patient completed 72hrs of octreotide gtt post-op via RIJ (removed 07/30) and has been continued on lactulose, rifaximin, and prophylactic ceftriaxone (7-day course finished 08/03). Underwent paracentesis with 3L removed by IR and albumin replacement on 07/31. Has had some continued oozing through GI tract but no cuba bleeding. Patient increasingly encephalopathic as of 08/02 evening (did not receive daytime lactulose). Stroke code was called on 08/02 evening for increased lethargy and unequal pupils per nursing; Neuro felt that suspicion for stroke low, no further workup pursued. Repeat 2L paracentesis on 08/03 negative for SBP and rectal lactulose initiated. Completed 7-day course of ceftriaxone on 08/03.    Updates:   - Urine cx pending  - Hold midodrine, will discontinue if BP  stable x24 hours  - Continue lactulose/rifaximin for HE, lacutlose BID today  - Monitor UOP, PVRs, straight cath >400mL  - Encourage hydration and nutrition, calorie counts    #Hepatic encephalopathy  #Delirium  #Ascites  #Acute on chronic decompensated EtOH cirrhosis  #Alcohol use disorder in remission  Patient was admitted to OSH on 07/15 with confusion and difficulty speaking; brain MR showed mild chronic microvascular ischemic changes within cerebral white matter and small chronic cerebellar infarcts with no acute findings. Ultimately confusion was thought to be 2/2 ESBL UTI for which she completed course of ertapenem and Flagyl. At OSH, ammonia was slightly elevated to 40 and mental status improved with lactulose. On admission, ammonia WNL and lactulose discontinued. Patient was initially able to recall details of her hospitalization with occasional difficulty but overall appears mentally very clear and able to converse easily. Currently A&Ox4. She is certainly at higher risk for delirium, however, so would maintain precautions. S/p large volume paracentesis (5L) on 07/19 and 07/24; per family, has had ~6 volodymyr over past few months. Abdominal US on 07/29 without concerning findings; showing portal HTN, splenomegaly, and diffuse gallbladder wall thickening (possible adenomyomatosis or cholesterolosis). Completed courses of ertapenem and Flagyl on 07/23. No active infection. SBP ppx with ceftriaxone given bleeding varices as above. S/p repeat para (3L) by IR with 50g albumin replacement on 07/31; fluid sent for diagnostics and negative for SBP. Has had modest PO intake. Stroke code activated at 1730 on 08/02 for unequal pupils, lethargy, and patient slumped over in chair per nursing; Neuro felt that suspicion low for stroke so no further workup pursued. Patient now increasingly encephalopathic 08/03 with ammonia 194, likely exacerbated by oozing from banded EV sites and missed lactulose doses on 08/02. Repeat 2L  para on 08/03 negative for SBP, albumin replacement given. Finished 7-day course of ceftriaxone on 08/03.   - Delirium precautions  - PRN volodymyr for comfort (always w/ diagnostic studies except cytology +/- w/ albumin replacement) - may need regularly as outpatient  - Continue rifaximin and lactulose titrated to 3-4 stools/day (if unable to tolerate PO, via rectal tube) - outpatient dose 20g BID  - Regular diet with Na restriction to 2000 mg daily, 1.5 mg/kg/day protein   - Calorie counts, encourage PO intake  - PT/OT    MELD-Na score: 20 at 8/5/2020  6:53 AM  MELD score: 20 at 8/5/2020  6:53 AM  Calculated from:  Serum Creatinine: 2.32 mg/dL at 8/5/2020  6:53 AM  Serum Sodium: 146 mmol/L (Rounded to 137 mmol/L) at 8/5/2020  6:53 AM  Total Bilirubin: 1.4 mg/dL at 8/4/2020  7:21 AM  INR(ratio): 1.54 at 8/5/2020  6:53 AM  Age: 70 years 4 months     #Possible UTI  Patient now endorsing sharp/shooting pains in perineal region and 08/04 UA concerning for UTI. Was initially admitted to OSH for ESBL UTI, which provoked ARF and HE.  - Await urine culture    #Hypernatremia  #Oliguric MARCELO on CKD stage IV  Baseline Cr 2-3. At OSH, Cr 5.44 with minimal UOP on 07/28 and renal US negative for mass or hydronephrosis. MARCELO likely multifactorial (prerenal and intrinsic renal) from ATN (shock). Potassium WNL. UOP has been minimal. Per Renal, likely not HRS so albumin challenge not needed. Serum creatinine downtrending, albeit slowly. Octreotide stopped and RIJ removed 07/30. Wellington removed 08/01, some urinary retention since then. Na up to 149 on 08/04 so 1L NS bolus given, improved to 146 on 08/05.   - Nephrology consult appreciated, signed off   - No acute need for dialysis at this time   - Hold midodrine, will discontinue if BP stable next 24 hours  - avoid nephrotoxins  - Continue PTA sodium bicarb 1300 mg BID  - Continue PTA sevelamer 1600 mg TID with meals  - Monitor UOP, PVR/bladder scan qshift, straight cath >400mL    #Acute on  chronic normocytic anemia   #Esophageal varices s/p banding x5  #Hypovolemic shock, resolved  #Thrombocytopenia of chronic liver disease   At OSH, initially thought septic shock 2/2 UTI. Hgb dropped 8.4 --> 7.3 --> 6.4 at so received 3-4u pRBCs while there and another en route to [facility]. Hypovolemic shock precipitated by BRBPR and bleeding esophageal varices, which were banded x5 on 07/27. Required intermittent BP support with Levophed at OSH (weaned off 07/29 morning). Patient reportedly had episode of hematemesis morning prior to transfer. On admission, had episode of melena on 07/28 night. Hgb has been stable while in ICU, 8.1 --> 8.1 --> 7.9. Consent for PRN transfusion obtained via phone with POA; PIV in place. Plts 58 on admission, consistent with recent baseline; improving slightly. Has not required pressors here. Finished 72hrs of octreotide gtt and RIJ removed on 07/31. Completed 7-day course of ceftriaxone on 08/03. Platelets stable in the 50s-70s. Hgb stable at ~8.5. 7-day course of ceftriaxone completed 08/03.  - Regular diet as above  - GI Hepatology consulted, signed off   - Continue PO pantoprazole BID   - Monitor stool output   - Trend plts   - Monitor Hgb BID, transfuse if <7  - Zofran and Tums PRN  - Incentive spirometry     #HFpEF  PTA on torsemide. Echo at OSH from 07/20 showed EF >75%, sclerotic mitral valve with trace MR. Elevated EF c/w high-output heart failure in setting of cirrhosis with ascites. Was continued on torsemide and started on spironolactone at OSH, but both were later held 2/2 hypotension requiring pressor support (weaned 07/28 morning).  - Hold PTA torsemide for now     #IDDM  PTA on insulin glargine 14u QHS. Well-controlled, A1c 5.3% on 07/15. At OSH, was on medium SSI and 7u glargine QHS.   Recent Labs   Lab 08/05/20  0653 08/05/20  0150 08/04/20  2103 08/04/20  1623 08/04/20  1042 08/04/20  0721 08/04/20  0202 08/03/20  2107 08/03/20  1954  08/03/20  0716  08/02/20  0804   08/01/20  0704   GLC 92  --   --   --   --  126*  --   --  145*  --  191*  --  174*  --  99   BGM  --  122* 163* 156* 104*  --  146* 134*  --    < >  --    < >  --    < >  --     < > = values in this interval not displayed.   - Continue medium dose SSI  - Continue home dose glargine 12u at bedtime  - Hypoglycemia protocol      #Hypothyroidism   PTA on levothyroxine. TSH 16.58, free T4 0.78 @ OSH.   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 At OSH, received hydrocortisone 50mg Q6H for stress dosing in setting of hypotension. Weaned stress dosed hydrocortisone as patient has been normotensive. No RLS symptoms reported since admission.  - Continue home dose of 5 mg prednisone daily   - Continue PTA gabapentin and pramipexole     #Bone mineral disorder  #Osteoarthritis  PTA on sevelamer carbonate for BMD. OA longstanding, primarily in shoulders and right knee. Patient does not want APAP. Regarding BMD, Ca and P at goal. Having increased pain after therapies.  - Continue sevelamer  - Pain management: topical lidocaine and low-dose oxycodone  - PT/OT    #Intertrigo  Rash diffusely under arms, breasts, pannus, and groin per nursing.  - Micatin powder PRN     #Gout  - Hold PTA allopurinol      Diet: <2 g, 1.5mg/kg/day protein diet  Fluids: None  Lines: L PIV  DVT Prophylaxis: Pneumatic Compression Devices  Wellington Catheter: not present  Code Status: DNR/DNI       Disposition Plan   Expected discharge: 1-2 days, recommended to TCU once continued improvement in HE and mental status and TCU placement established.  Entered: Edilma Roque 08/05/2020, 11:25 AM     The patient's care was discussed with the Primary team.    Edilma Roque, MS4  Medicine - [place] 2  Pager [phone]    Resident/Fellow Attestation   I, Chava Azevedo, was present with the medical student who participated in the service and in the documentation of the note.  I have  verified the history and personally performed the physical exam and medical decision making.  I agree with the assessment and plan of care as documented in the note.      This is a shared note, my edits are incorporated.     Chava Azevedo MD  PGY1  Date of Service (when I saw the patient): 08/05/20    ______________________________________________________________________    Interval History   Overnight, patient noted to be crying in pain d/t back discomfort by nursing staff. Was given Flexeril, topical capsaicin, and 30-minute back massage with subsequent improvement.    Patient states she feels much better than yesterday and last night. This morning, patient continues to endorse back pain though less severe. Also complains of abdominal discomfort. Slept intermittently. Wants to sit up in chair today, thinks PT and chair will help with back pain. Vaginal pain resolved.     Data reviewed today: I reviewed all medications, new labs and imaging results over the last 24 hours. I personally reviewed new labs.    Physical Exam   Vital Signs: Temp: 97.1  F (36.2  C) Temp src: Oral BP: 133/55 Pulse: 73 Heart Rate: 86 Resp: 20 SpO2: 98 % O2 Device: None (Room air)    Weight: 186 lbs 4.62 oz  General: Alert, sitting in chair. Appears comfortable. Eating breakfast.   HEENT: Bilateral submandibular swelling  CV: Regular rate and rhythm, blowing 3/6 holosystolic murmur, no clicks or rubs  Resp: Clear to auscultation bilaterally  Abd: Firm, distended without fluid wave, loud BS, mild diffuse tenderness to palpation. Large midline hernia and subumbilical healed surgical scar  : Incontinent of urine  Extremities: 1+ pedal edema  Skin: Dry flaking skin over feet. Chronic venous stasis changes to BLEs  Neuro: Alert and oriented to self, place, and time. Follows commands    Data   Recent Labs   Lab 08/05/20  0653 08/04/20  0721 08/03/20  1954 08/03/20  0716  08/01/20  0704   WBC  --  5.3  --  5.8  --  5.4   HGB  --  8.7*  --  8.5*   --  7.9*   MCV  --  101*  --  100  --  99   PLT  --  60*  --  66*  --  58*   INR 1.54* 1.55*  --  1.48*   < >  --    * 149* 145* 143   < > 141   POTASSIUM 4.2 4.2 4.4 4.7   < > 4.6   CHLORIDE 120* 122* 118* 116*   < > 114*   CO2 21 20 21 20   < > 18*   BUN 46* 56* 64* 67*   < > 80*   CR 2.32* 2.65* 2.79* 2.90*   < > 3.70*   ANIONGAP 6 7 6 7   < > 8   LUIS ALFREDO 8.6 8.6 8.5 8.4*   < > 8.0*   GLC 92 126* 145* 191*   < > 99   ALBUMIN  --  3.1*  --  2.7*   < > 2.5*   PROTTOTAL  --  6.3*  --  5.8*   < > 5.1*   BILITOTAL  --  1.4*  --  1.3   < > 1.4*   ALKPHOS  --  111  --  102   < > 78   ALT  --  10  --  10   < > 8   AST  --  10  --  11   < > 12    < > = values in this interval not displayed.

## 2020-08-05 NOTE — PLAN OF CARE
Transferred from , VSS on RA, alert, orientated, up in chair, using lift to get OOB, pt denies pain, per report incontinent of bowel and bladder, having loose watery stools, pulsate mattress due to immobility in place on bed

## 2020-08-05 NOTE — PLAN OF CARE
5A    Discharge Planner PT   Patient plan for discharge: Rehab  Current status: Pt lethargic and struggled to respond to verbal cuing throughout session. Pt completed rolling for sling placement with Min A. Dependent for lift transfer from bed to Moveo device. Pt performed partial squats on Moveo with 25%-35% bodyweight, required physical assist at R knee to prevent valgus positioning.   Barriers to return to prior living situation: Medical status, need for assistance  Recommendations for discharge: TCU  Rationale for recommendations: Below baseline level of function, requires additional therapy services to improve functional mobility.        Entered by: Bret Guo 08/05/2020 4:07 PM

## 2020-08-05 NOTE — PLAN OF CARE
Time 7677-6428    Reason for admission: sepsis, UTI   Vitals: Stable on room air. Holding midodrine d/t improved BPs.   Activity: Lifted to recliner chair and spent most of morning upright. Worked with OT and PT.   Pain: Reported pain in R knee - lidocaine patch placed; and in shoulders and back - capsaicin cream done; and PRN oxycodone with some decrease in reported levels.   Neuro:   A/O except for specific date when first assessed in AM (thought it was August 7th not 5th). Remains on scheduled lactulose.  Cardiac: No acute issues   Respiratory:  Some coughing noted when first woke up in AM but otherwise no complaints.   GI/: Incontinent of urine and liquid stool. Several stools this shift.   Diet: Ate much of her breakfast and a full supplement from kitchen. Declined ordering any lunch.   Lines: PIV saline locked   Skin/Wounds: Miconozole powder placed in groin and under breasts; areas are red and slightly excoriated. Blanchable redness on buttocks and sacrum. Pulsate mattress in place and frequent repositioning in bed and chair as able.   Endocrine: BGs before meals and HS. BGs 90s - 120s. No sliding scale insulin indicated.   Labs/imaging: AM labs.         New changes this shift:  Pt reported that she was feeling better today than yesterday. Slept for several hours after sitting in chair. Lactulose dosing adjusted by MD.     Plan: FABRIZIO looking for TCU placement.     Continue to monitor and follow POC    1830: Patient transferred to . Report given to RN. Daughter in law contacted to notify of transfer.

## 2020-08-05 NOTE — PLAN OF CARE
Pt admitted for decompensated cirrhosis, septic shock, UTI. A&Ox4. Forgetful. VSS on RA. Pt c/o pain in R knee, bilateral shoulders, back, and abdomen. Administered oxy and flexiril. Applied capsaicin cream to shoulders. Pt c/o nausea. Administered IV zofran. Bladder scan for 108. Pt incontinent of bowel and bladder. 4 BMs overnight. Applied miconazole powder to joi area. UC pending. Pt not scoring on [place] scale. No appetite. Will continue to monitor.

## 2020-08-05 NOTE — PROGRESS NOTES
Social Work Services Progress Note     Hospital Day: 9  Date of Initial Social Work Evaluation:  7/31/20  Collaborated with:  Primary team Massiel Benito, TCU facilities     Data:  Pt is a 70-year-old female admitted to [facility] 7/28/20 from OSH for possible CRRT in setting of acute renal failure. TCU is recommended at discharge.      Intervention:  Following up on TCU referral. Left  for . Rachel[facility].     Pt will require insurance authorization from [facility] prior to d/c.     Faxed referral to:  1) [facility] ( [phone])- left  8/5     Assessment:  Pursuing TCU placement     Plan:    Anticipated Disposition:  Facility:  TCU    Barriers to d/c plan:  Medical stability    Follow Up:  SW to follow for discharge planning     ALANNAH De Jesus, [facility]    [facility]  Pager [phone]  Phone [phone]    Addendum 11:18am: Received call from Rimma at [facility]- she is submitting for insurance auth to [facility]. Requesting updated therapy notes. Faxed therapy notes from yesterday. Will fax therapy notes from today when available.    Addendum 3:54pm: Faxed OT note from today to [facility]. Left  for admissions.

## 2020-08-06 NOTE — PLAN OF CARE
/51 (BP Location: Left arm)   Pulse 91   Temp 98.1  F (36.7  C) (Axillary)   Resp 18   Ht 1.524 m (5')   Wt 84.5 kg (186 lb 4.6 oz)   SpO2 100%   BMI 36.38 kg/m    Time of Care [id number]  Neuros: A&Ox4, sometimes forgetful. [place] 0, on scheduled lactulose.  Cardiac: WNL, denies chest pain.  Respiratory: Sat's well on RA. Denies SOB  Diet: Regular diet, 2 gram sodium restriction. Fair appetite.   Activity: Assist of 2, total cares. Rolls well. Q2 turns.   GI/: Incontinent of both bowel and bladder, patient unable to tell if she has voided, incontinence cares completed often. Barrier cream applied to R groin skin tear. Miconazole applied to groin per MAR  Lines: L PIV SL.   Skin: Blanchable redness buttocks and sacrum. No sacral mepilex d/t frequent loose stools. New skin tear assessed in R groin (see LDA or 7cskin note)  Labs: Na 146, Cr 2.32.  Pain: C/o back and shoulder chronic pain  PRN: Oxycodone. Melatonin at bedtime.  Plan: Held 8pm dosage of lactulose, patient has had over 5 BM's today and is alert.

## 2020-08-06 NOTE — PROGRESS NOTES
[facility], [place]    Progress Note - Maroon 2 Service        Date of Admission:  7/28/2020    Assessment & Plan   Caryl Martin is a 70 y.o. F with past medical history of HFpEF, CVA, aortic stenosis and mitral stenosis, HTN, alcoholic cirrhosis (c/b portal hypertensive gastropathy, ascites, encephalopathy, esophageal varices, and bone marrow aplasia), DM type II, CKD stage IV with chronic anemia, morbid obesity, hypothyroidism, wheelchair-bound d/t CIDP, and depression who was admitted to [facility] MICU on 07/28/2020 after transfer from [facility] in [place] for possible CRRT in setting of acute renal failure. At OSH, hospitalization was complicated by acute on chronic decompensated cirrhosis with bleeding esophageal varices (now s/p banding x5), septic shock, and progressive renal failure, likely provoked by ESBL UTI.    Patient has remained hemodynamically stable since arrival and transferred to floor on 07/29 with continued renal failure but improving serum creatinine. Renal felt no need for HD at this time. UOP has remained minimal. Has been continued on midodrine, per Hepatology.    Regarding EV bleed, patient completed 72hrs of octreotide gtt post-op via RIJ (removed 07/30) and has been continued on lactulose, rifaximin, and prophylactic ceftriaxone (7-day course finished 08/03). Underwent paracentesis with 3L removed by IR and albumin replacement on 07/31. Has had some continued oozing through GI tract but no cuba bleeding. Patient increasingly encephalopathic as of 08/02 evening (did not receive daytime lactulose). Stroke code was called on 08/02 evening for increased lethargy and unequal pupils per nursing; Neuro felt that suspicion for stroke low, no further workup pursued. Repeat 2L paracentesis on 08/03 negative for SBP and rectal lactulose initiated. Completed 7-day course of ceftriaxone on 08/03. Symptomatic Enterococcus UTI noted 08/04 so linezolid started, pending  senstivities.    Updates:   - Repeat para tomorrow - diagnostic and therapeutic - only remove up to 4 L - will follow with albumin for renal protection  - Continue linezolid for UTI, await sensitivities  - Continue lactulose/rifaximin for HE, lacutlose BID  - Monitor UOP, PVRs, straight cath >400mL  - Encourage hydration and nutrition, calorie counts      #Hepatic encephalopathy  #Delirium  #Ascites  #Acute on chronic decompensated EtOH cirrhosis  #Alcohol use disorder in remission  Patient was admitted to OSH on 07/15 with confusion and difficulty speaking; brain MR showed mild chronic microvascular ischemic changes within cerebral white matter and small chronic cerebellar infarcts with no acute findings. Ultimately confusion was thought to be 2/2 ESBL UTI for which she completed course of ertapenem and Flagyl. At OSH, ammonia was slightly elevated to 40 and mental status improved with lactulose. On admission, ammonia WNL and lactulose discontinued. Patient was initially able to recall details of her hospitalization with occasional difficulty but overall appears mentally very clear and able to converse easily. Currently A&Ox4. She is certainly at higher risk for delirium, however, so would maintain precautions. S/p large volume paracentesis (5L) on 07/19 and 07/24; per family, has had ~6 volodymyr over past few months. Abdominal US on 07/29 without concerning findings; showing portal HTN, splenomegaly, and diffuse gallbladder wall thickening (possible adenomyomatosis or cholesterolosis). Completed courses of ertapenem and Flagyl on 07/23. No active infection. SBP ppx with ceftriaxone given bleeding varices as above. S/p repeat para (3L) by IR with 50g albumin replacement on 07/31; fluid sent for diagnostics and negative for SBP. Has had modest PO intake. Stroke code activated at 1730 on 08/02 for unequal pupils, lethargy, and patient slumped over in chair per nursing; Neuro felt that suspicion low for stroke so no  further workup pursued. Patient now increasingly encephalopathic 08/03 with ammonia 194, likely exacerbated by oozing from banded EV sites and missed lactulose doses on 08/02. Repeat 2L para on 08/03 negative for SBP, albumin replacement given. Finished 7-day course of ceftriaxone on 08/03.   - Delirium precautions  - PRN volodymyr for comfort (always w/ diagnostic studies except cytology +/- w/ albumin replacement) - may need regularly as outpatient   - Repeat 3-4L para tomorrow with albumin replacement  - Continue rifaximin and lactulose titrated to 3-4 stools/day (if unable to tolerate PO, via rectal tube) - outpatient dose 20g BID  - Regular diet with Na restriction to 2000 mg daily, 1.5 mg/kg/day protein   - Calorie counts, encourage PO intake   - No need for feeding tube at this time  - PT/OT    MELD-Na score: 22 at 8/6/2020  6:06 AM  MELD score: 22 at 8/6/2020  6:06 AM  Calculated from:  Serum Creatinine: 2.36 mg/dL at 8/6/2020  6:06 AM  Serum Sodium: 144 mmol/L (Rounded to 137 mmol/L) at 8/6/2020  6:06 AM  Total Bilirubin: 1.4 mg/dL at 8/4/2020  7:21 AM  INR(ratio): 1.67 at 8/6/2020  6:06 AM  Age: 70 years 4 months     #UTI  Patient now endorsing sharp/shooting pains in perineal region and 08/04 UA concerning for UTI. Was initially admitted to OSH for ESBL UTI, which provoked ARF and HE. Urine culture growing 50-100k Enterococcus.  - Continue linezolid 600 mg BID  - Await sensitivities    #Hypernatremia  #Oliguric MARCELO on CKD stage IV  Baseline Cr 2-3. At OSH, Cr 5.44 with minimal UOP on 07/28 and renal US negative for mass or hydronephrosis. MARCELO likely multifactorial (prerenal and intrinsic renal) from ATN (shock). Potassium WNL. UOP has been minimal. Per Renal, likely not HRS so albumin challenge not needed. Serum creatinine downtrending, albeit slowly. Octreotide stopped and RIJ removed 07/30. Wellington removed 08/01, some urinary retention since then. Na up to 149 on 08/04 so 1L NS bolus given, improved to 146 on  08/05.   - Nephrology consult appreciated, signed off   - discontinue midodrine  - Avoid nephrotoxins  - Continue PTA sodium bicarb 1300 mg BID  - Continue PTA sevelamer 1600 mg TID with meals  - Monitor UOP, PVR/bladder scan qshift, straight cath >400mL    #Acute on chronic normocytic anemia   #Esophageal varices s/p banding x5  #Hypovolemic shock, resolved  #Thrombocytopenia of chronic liver disease   At OSH, initially thought septic shock 2/2 UTI. Hgb dropped 8.4 --> 7.3 --> 6.4 at so received 3-4u pRBCs while there and another en route to [facility]. Hypovolemic shock precipitated by BRBPR and bleeding esophageal varices, which were banded x5 on 07/27. Required intermittent BP support with Levophed at OSH (weaned off 07/29 morning). Patient reportedly had episode of hematemesis morning prior to transfer. On admission, had episode of melena on 07/28 night. Hgb has been stable while in ICU, 8.1 --> 8.1 --> 7.9. Consent for PRN transfusion obtained via phone with POA; PIV in place. Plts 58 on admission, consistent with recent baseline; improving slightly. Has not required pressors here. Finished 72hrs of octreotide gtt and RIJ removed on 07/31. Completed 7-day course of ceftriaxone on 08/03. Platelets stable in the 50s-70s. Hgb stable at ~8.5. 7-day course of ceftriaxone completed 08/03.  - Regular diet as above  - GI Hepatology consulted, signed off   - Continue PO pantoprazole BID   - Monitor stool output   - Monitor Hgb BID, transfuse if <7  - Zofran and Tums PRN  - Incentive spirometry     #HFpEF  PTA on torsemide. Echo at OSH from 07/20 showed EF >75%, sclerotic mitral valve with trace MR. Elevated EF c/w high-output heart failure in setting of cirrhosis with ascites. Was continued on torsemide and started on spironolactone at OSH, but both were later held 2/2 hypotension requiring pressor support (weaned 07/28 morning).  - Hold PTA torsemide for now     #IDDM  PTA on insulin glargine 14u QHS. Well-controlled, A1c  5.3% on 07/15. At OSH, was on medium SSI and 7u glargine QHS.   Recent Labs   Lab 08/06/20  1213 08/06/20  0813 08/06/20  0606 08/06/20  0214 08/05/20  2151 08/05/20  1949/20  1758  08/05/20  0653  08/04/20  0721  08/03/20  1954  08/03/20  0716  08/02/20  0804   GLC  --   --  159*  --   --   --   --   --  92  --  126*  --  145*  --  191*  --  174*   * 134*  --  189* 193* 152* 128*   < >  --    < >  --    < >  --    < >  --    < >  --     < > = values in this interval not displayed.   - Continue medium dose SSI  - Continue home dose glargine 14u at bedtime  - Hypoglycemia protocol      #Hypothyroidism   PTA on levothyroxine. TSH 16.58, free T4 0.78 @ OSH.   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 At OSH, received hydrocortisone 50mg Q6H for stress dosing in setting of hypotension. Weaned stress dosed hydrocortisone as patient has been normotensive. No RLS symptoms reported since admission.  - Continue home dose of 5 mg prednisone daily   - Continue PTA gabapentin and pramipexole     #Bone mineral disorder  #Osteoarthritis  PTA on sevelamer carbonate for BMD. OA longstanding, primarily in shoulders and right knee. Patient does not want APAP. Regarding BMD, Ca and P at goal. Having increased pain after therapies.  - Continue sevelamer  - Pain management: topical lidocaine and low-dose oxycodone  - PT/OT    #Intertrigo  Rash diffusely under arms, breasts, pannus, and groin per nursing.  - Micatin powder PRN     #Gout  - Hold PTA allopurinol      Diet: <2 g, 1.5mg/kg/day protein diet  Fluids: None  Lines: L PIV  DVT Prophylaxis: Pneumatic Compression Devices  Wellington Catheter: not present  Code Status: DNR/DNI       Disposition Plan   Expected discharge: Tomorrow, recommended to TCU once TCU placement established.  Entered: Edilma Roque 08/06/2020, 12:47 PM     The patient's care was discussed with the Primary  team.    Edilma Roque, MS4  Medicine - Maroon 2  Pager [phone]    I saw the patient in conjunction with my MS4, Edilma Roque, and in the above documention reflects our shared examination and decision making with my edits in blue. Patient was staffed with my attending, Dr Paul Kay.    Siobhan Espinoza MD  Internal Medicine & Pediatrics PGY4  Pager: [phone]    ______________________________________________________________________    Interval History   No acute events overnight. Patient complains of abdominal discomfort today and back pain (thinks d/t positioning), though improved. Was able to eat 90% of breakfast per nursing and drink Boost shake. Slept poorly overnight and finally fell asleep of 0500. No other complaints.     Medical student called to bedside at 1215 for chest/abdominal/back pain, nausea, worsening distension, and anxiety. Patient tearful and anxious. Cardiac exam unchanged and VSS. EKG with regular rhythm and no ST changes. Patient given oxycodone, Zofran, and Tums.     Data reviewed today: I reviewed all medications, new labs and imaging results over the last 24 hours. I personally reviewed new labs.    Physical Exam   Vital Signs: Temp: 97.8  F (36.6  C) Temp src: Axillary BP: 106/45 Pulse: 75 Heart Rate: 92 Resp: 16 SpO2: 97 % O2 Device: None (Room air)    Weight: 186 lbs 4.62 oz  General: Alert, sitting in chair. Appears comfortable. Drinking shake  HEENT: Bilateral submandibular swelling  CV: Regular rate and rhythm, blowing 3/6 holosystolic murmur, no clicks or rubs  Resp: Clear to auscultation bilaterally  Abd: Firm, distended without fluid wave, +BS, mild diffuse tenderness to palpation. Large midline hernia and subumbilical healed surgical scar  Skin: Dry flaking skin over feet. Chronic venous stasis changes to BLEs  Neuro: Alert and oriented to self, place, and time. Follows commands    Data   Recent Labs   Lab 08/06/20 0606 08/05/20  0653 08/04/20  0721  08/03/20  0716   WBC 6.7  --   5.3  --  5.8   HGB 8.2*  --  8.7*  --  8.5*   *  --  101*  --  100   PLT 59*  --  60*  --  66*   INR 1.67* 1.54* 1.55*  --  1.48*    146* 149*   < > 143   POTASSIUM 4.3 4.2 4.2   < > 4.7   CHLORIDE 120* 120* 122*   < > 116*   CO2 18* 21 20   < > 20   BUN 42* 46* 56*   < > 67*   CR 2.36* 2.32* 2.65*   < > 2.90*   ANIONGAP 6 6 7   < > 7   LUIS ALFREDO 8.3* 8.6 8.6   < > 8.4*   * 92 126*   < > 191*   ALBUMIN  --   --  3.1*  --  2.7*   PROTTOTAL  --   --  6.3*  --  5.8*   BILITOTAL  --   --  1.4*  --  1.3   ALKPHOS  --   --  111  --  102   ALT  --   --  10  --  10   AST  --   --  10  --  11    < > = values in this interval not displayed.

## 2020-08-06 NOTE — PROVIDER NOTIFICATION
Writer completed bedside dysphagia screening. Patient failed 3 oz water test. Paged Maroon 2 intern and waiting for response.  2nd page sent. MD ordering NPO status. Patient will need to have a new swallow study as well.

## 2020-08-06 NOTE — PROGRESS NOTES
"Social Work Services Progress Note    Hospital Day: 10  Date of Initial Social Work Evaluation:  7/31/20- please see for details   Collaborated with:  Chart review, team rounds, medical team, [facility] (p. [phone], f. [phone]), SELENA Katz    Data:  \"Caryl Martin is a 70 y.o. F with past medical history of HFpEF, CVA, aortic stenosis and mitral stenosis, HTN, alcoholic cirrhosis (c/b portal hypertensive gastropathy, ascites, encephalopathy, esophageal varices, and bone marrow aplasia), DM type II, CKD stage IV with chronic anemia, morbid obesity, hypothyroidism, wheelchair-bound d/t CIDP, and depression who was admitted to [facility] MICU on 07/28/2020 [facility] in setting of acute renal failure\".     Per chart review [facility] is pursuing insurance approval for TCU placement.     Received update from pt's medical team that pt could possibly be ready for discharge later today or Friday. SW later received update from team that pt is not getting a feeding tube placed, is eating well, and is anticipated to be ready for discharge Friday as there have been adjustments made to pt's lactulose today.     Per SELENA Katz w/c transport would be appropriate for discharge.     Intervention:  FABRIZIO contacted [facility]- spoke with Rimma in admissions and provided update. Rimma asked if pt is getting a feeding tube placed as recommended by the Dietician. Rimma also noted that she has not heard back from [employer] but will call them and follow up. Rimma reported she will update SW as she learns more and asked that SW update her as to the plan for a feeding tube.     FABRIZIO paged pt's medical team x2 inquiring about whether or not pt is getting a feeding tube and indicated that TCU needs to know, waiting to hear back. Per Dietician Raymond the team told her they are not planning to place a feeding tube as they feel pt is " eating better than is being recorded, and they feel pt is a poor historian with reporting what she has eaten. FABRIZIO informed Rimma at [facility] of this and she reported that [facility] had called them inquiring about this as well and that they will be calling back. Rimma said she will notify SW once they have authorization from [facility]. Rimma later called and left a  reporting insurance approval was received.     PAS completed in anticipation of discharge- confirmation code: [id number].    Per pt's medical team pt will need weekly outpatient paracentesis, which is new for pt. Team noted pt will get a para prior to discharge Friday then will need another late the following week.     FABRIZIO spoke with Rimma in admissions at [facility] and reported discharge anticipated for Friday and also explained pt's need for outpatient paracentesis. Rimma reported that pt will go to [facility] which is connected to the [facility] for volodymyr and requested that pt's discharge orders include the need for volodymyr, the frequency, and the date for when pt should have her next para after discharge. Rimma noted that pt can arrive to TCU anytime Friday.     FABRIZIO updated Dr. Espinoza of the requirement that para information, including frequency and date of next needed after discharge be included in pt's discharge orders.     W/c transport arranged via [facility] ([phone]) for Friday 8/7/20 at 2pm.     Assessment:  See bedside RN, PT/OT, medical team notes    Plan:    Anticipated Disposition:  Facility:  [facility]     Barriers to d/c plan:  Medical stability    Follow Up:  FABRIZIO BYERS will continue to follow    ALANNAH Aparicio, [address]   [phone] (pager) [phone]  8/6/2020

## 2020-08-06 NOTE — PLAN OF CARE
PT cancel: Per OT, patient not appropriate for PT today. OT reports patient complaining of significant fatigue/nausea and refusing further PT this afternoon. Will reschedule as able/appropriate.

## 2020-08-06 NOTE — PLAN OF CARE
Admitted/transferred from:   2 RN skin assessment completed by Jessica Mcleod RN and Durga WYATT  Skin assessment finding: R inner groin skin tear (new), added to LDA's. Red and excoriated groin/periarea. Redness under both breasts. Romulo BLE. Blanchable redness on buttocks and sacrum.  Interventions/actions: Q2 turns per nursing staff. Miconazole powder applied per MAR to breasts and groin. Incontinence cares completed often, leaving brief unfastened to prevent skin irritation. Pulsate mattress. Barrier cream applied to newly assessed skin tear.  Will continue to monitor.

## 2020-08-06 NOTE — PLAN OF CARE
NEURO: A&O x4, forgetful. Numbness/tingling in BLE at baseline. Calls appropriately. Pleasant, can be anxious at times.   RESPIRATORY: LS diminished. Satting well on RA. Denies SOB   CARDIAC: Hx of HTN and CVA. C/o sharp chest pain radiating to back. MD paged, EKG done-no further tests ordered. Soft BP, Midodrine stopped yesterday.   GI/: Incontinent of urine/stool. LBM 8/06 x3 (since midnight). On scheduled Lactulose   DIET: Regular diet, needs help/reminders to order meals  PAIN/NAUSEA: C/o ongoing abdominal pain. PRN Oxycodone given x1. PRN Zofran given for nausea   SKIN/INCISION/DRAINS: Scattered bruising, R thigh skin tear MARAL, paracentesis puncture site CDI  IV ACCESS: R PIV SL   ACTIVITY: Mechanical lift x2   LAB: Hgb 8.2, creatinine 2.36, INR 1.67, BUN 42  PLAN: Monitor chest pain, pain management, continue with POC

## 2020-08-06 NOTE — PLAN OF CARE
/50 (BP Location: Left arm)   Pulse 88   Temp 96.9  F (36.1  C) (Axillary)   Resp 16   Ht 1.524 m (5')   Wt 84.5 kg (186 lb 4.6 oz)   SpO2 100%   BMI 36.38 kg/m     Patient with complaints of abd pain/tightness.  Oxicodone given with fair relief.  Abd large/slightly firm, tender.  Patient has had 2 large incontinent soft stools, on scheduled lactulose.  Appears to be incontinent of urine x2 though hard to tell due to incontinence of both bowel and bladder .  LS diminished, O2 sats upper 90's on room air, denies SOB.  Skin with bruising, BLE skin kelton and with bilateral numbness/tingling.  Blanchable redness on buttocks.  Turning q2 hours with assist of 1-2.  Glc 189.  .  Alert and oriented x4, forgetful.  Continue with POC.

## 2020-08-07 NOTE — CONSULTS
Interventional Radiology Consult Service Note    Patient is on IR schedule 8/7 for a dx and therapeutic paracentesis (max 4L drained).   No NPO required.  Consent is current.    Please contact the IR [place] RN at [phone] for estimated time of procedure.     Case discussed with Dr. Marks from IR and Dr. Espinoza. This is a 70 y.o. F with past medical history of HFpEF, CVA, aortic stenosis and mitral stenosis, HTN, alcoholic cirrhosis (c/b portal hypertensive gastropathy, ascites, encephalopathy, esophageal varices, and bone marrow aplasia), DM type II, CKD stage IV with chronic anemia, morbid obesity, hypothyroidism, wheelchair-bound d/t CIDP, and depression who was admitted to [facility] MICU on 07/28/2020 after transfer from [facility]. During admission pt has required paracentesis and IR has been consulted for repeat para.     Dx labs orders entered by Dr. Espinoza.    Mallory Veras DNP, APRN  Interventional Radiology  Pager: [phone]

## 2020-08-07 NOTE — PROGRESS NOTES
Patient Analy Martin presented to interventional radiology for a diagnostic and therapeutic paracentesis.  Thorough ultrasound exam of all abdominal quadrants reveals only trace ascites without a window for paracentesis.  Of note patient has a significant amount of subcutaneous edema and what appeared to be very dilated loops of bowel on ultrasound exam.  Either of these things could be the source of her abdominal discomfort.    Patient was transported back to her room.  No procedure was performed.    Da Grove PA-C  Interventional Radiology  8/7/2020

## 2020-08-07 NOTE — H&P
MEDICAL ICU H&P  08/07/2020    Date of Hospital Admission: 7/28/2020  Date of ICU Admission: 8/7/2020  Reason for Critical Care Admission: GI bleed  Date of Service (when I saw the patient): 08/07/2020    ASSESSMENT: Analy Martin is a 70 year old female with PMH alcoholic cirrhosis c/b portal hypertensive gastropathy, ascites, encephalopathy, esophageal varices s/p 5 bands at OSH on 7/27, and bone marrow aplasia, CKD stage IV, wheelchair-bound 2/2 CIDP who was transferred to the ICU on 8/7 for GI bleed and hypotension.     CHANGES and MAJOR THINGS TODAY:   - Start PO vanco for C. Diff  - Central line placed  - EGD per GI  - Discontinue ceftriaxone ppx and octreotide per GI  - q6h CBC and INR  - recheck BMP @2000  - Monitor mental status and start miralax if decompensating       PLAN:    Neuro:  # Hepatic encephalopathy  # Delirium, resolved  Initially admitted to OSH for confusion, difficulty speak, ultimately thought 2/2 ESBL UTI. Mentation clear on admission to ICU here on 7/28 and upon transfer to floor. Stroke code activated 8/2 for unequal pupils, lethargy--low suspicion for stroke per neuro, no further workup pursued. Patient became increasingly encephalopathic 8/3 with ammonia 194, likely exacerbated by oozing from banded EV sites and missed lactulose doses on 8/2--improved with resumption of lactulose 8/3 and has been mentally clear since.   - Delirium precautions   - Will hold off on further lactulose in setting of C. 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 At OSH, received hydrocortisone 50mg Q6H for stress dosing in setting of hypotension. Weaned  stress dosed hydrocortisone as patient has been normotensive. No RLS symptoms reported since admission.  - Change home dose 5 mg prednisone to 4 mg IV methylprednisolone while NPO  - Hold PTA gabapentin and pramipexole while NPO    Pulmonary:  No active issues.  Sats in upper 90s on room air.     Cardiovascular:  # Transient hypotension in setting of GI bleed  Pt with MAPs<65 after large volume melena. Recheck of BP after repositioning of cuff and 1L LR improved.   - Additional 1L LR bolus  - 1 U pRBCs @1500    # Chest pain  Pt reported increased chest discomfort on 8/6. CXR and EKG without acute changes. Pt now attributing pain to coughing/spitting up sour-tasting sputum with blood.   - Monitor     # HFpEF  PTA on torsemide. Echo at OSH from 07/20 showed EF >75%, sclerotic mitral valve with trace MR. Elevated EF c/w high-output heart failure in setting of cirrhosis with ascites. Was continued on torsemide and started on spironolactone at OSH, but both were later held 2/2 hypotension.  - Continue holding PTA torsemide given hypotension     GI/Nutrition:  # Melena/hematochezia, hemoptysis   # Acute on chronic decompensated alcoholic cirrhosis c/b esophageal varices s/p banding x5  # Alcohol use disorder in remission   Pt with acute on chronic decompensated alcoholic cirrhosis with esophageal varices requiring placement of 5 bands at OSH on 7/27. She completed 7 day course of ceftriaxone on 8/3 for SBP ppx. Per family, pt has had increasing need for therapeutic paracenteses over past several months. Last para here on 8/3, attempted again 8/7 but no ascites noted. Pt reportedly coughed up ~100mL blood 8/6 PM and ~1400 8/7 had large volume melena overall concerning for recurrent GI bleed in setting of recently diagnosed UTI and C. Diff infection. Octerotide had been stopped 8/6 evening but was restarted 8/7 AM.   - GI consult for EGD--done 8/7 @1630, visualized clot in stomach, large post-banding ulcers but no bleeding  varices, gastric or duodenal hemorrhage  - Ceftriaxone restarted on transfer--per GI, OK to discontinue   - Octreotide continued initially on transfer--per GI, OK to discontinue   - IV pantoprazole 40 mg BID  - Monitor Hgb and stool closely, transfuse if hgb <7  - q6h CBC, INR, transfuse for goal INR >2, hgb>7, platelets>50K    MELD-Na score: 20 at 8/7/2020  6:22 AM  MELD score: 20 at 8/7/2020  6:22 AM  Calculated from:  Serum Creatinine: 2.44 mg/dL at 8/7/2020  6:22 AM  Serum Sodium: 143 mmol/L (Rounded to 137 mmol/L) at 8/7/2020  6:22 AM  Total Bilirubin: 1.1 mg/dL at 8/7/2020  6:22 AM  INR(ratio): 1.64 at 8/7/2020  6:22 AM  Age: 70 years 4 months    # C. Diff colitis   # Concern for ileus  Pt with abdominal pain for past 2-3 days. Therapeutic para attempted 8/7 AM, minimal ascites. AXR 8/7 with multiple dilated lops of bowel concerning for ileus. See ID.  - Start PO vancomycin 125 mg QID as below  - Monitor K and Mg closely, replete as needed    # Nutrition  Clear liquid diet, no reds    Renal/Fluids/Electrolytes:  # Oliguric MARCELO on CKD stage IV  Baseline Cr 2-3 at OSH, 5.44 on admission 7/28. Renal US negative for mass or hydronephrosis. UOP minimal. Nephrology consulted, no indication for dialysis at this time. Cr slowly downtrending since admission.   - Avoid nephrotoxins   - Hold PTA sodium bicarb 1300 mg BID while NPO  - Hold PTA sevelamer while NPO    # Urinary retention   Matthews removed 8/1, has had some urinary retention since then requiring intermittent straight cath for >400 mL on bladder scan.   - Will discuss with nursing--may need to place another matthews  - Hold tamsulosin while PO    # Hypophosphatemia   P 1.8 on 8/7.   - Replete  - Monitor phos daily     Endocrine:  # IDDM  PTA on insulin glargine 14u QHS. Well-controlled, A1c 5.3% on 7/15.   - Continue medium dose sliding scale insulin   - Continue home dose glargine 14u at bedtime   - Hypoglycemia protocol      # Hypothyroidism   PTA on  levothyroxine. TSH 16.58, free T4 0.78 @ OSH.   - Changed PTA 75 mcg levothyroxine to 37.5 mcq IV while NPO per pharmacy    # Bone mineral disorder  PTA on sevelamer carbonate for BMD  - Sevelamer held while NPO    ID:  # C. difficile colitis   Pt with 2-3 days of abdominal pain, large volume melena 8/7 afternoon. Has been incontinent of bowel on scheduled lactulose while on the floor. Pt afebrile with leukocytosis to 13 8/7 and C diff PCR came back positive on transfer to ICU 8/7.   - Start PO vancomycin 125 mg QID and monitor how she tolerates this--low threshold for IV Metronidazole or [place] Vancomycin given ileus  - Serial abdominal exams, low threshold for repeat AXR    # VRE UTI  Pt with sharp/shooting pains in perineal region 8/4 and UA concerning for UTI. Urine culture growing 50-100k vanco-resistant Enterococcus and 10-50k Candida parapsilosis complex.  - Continue linezolid 600 mg BID - changed to IV while NPO    Hematology:    # Leukocytosis   13 on 8/7, suspect 2/2 C. Diff as above.      # Acute on chronic normocytic anemia   Hgb drop from 8.7 to 7.8 on 8/7, received 1 U pRBCs in setting of large volume melena, hgb recheck 9 after transfusion.   - Trend, q6h CBC  - Transfuse for hgb<7    # Thrombocytopenia of chronic liver disease   Plts have been 60-70s since admission.   - Transfuse <50 with bleed    Musculoskeletal:  # Osteoarthritis  OA longstanding, primarily in shoulders and right knee. Patient does not want APAP. Having increased pain after therapies.   - Pain management: topical lidocaine/capsaicin, hold oxycodone while NPO  - PT/OT    # Gout  - Hold PTA allopurinol while NPO    Skin:  # Intertrigo  Rash diffusely under arms, breasts, pannus, and groin per nursing.  - Micatin powder PRN      General Cares/Prophylaxis:    DVT Prophylaxis: Pneumatic Compression Devices  GI Prophylaxis: PPI  Restraints: none  Family Communication: Discussed ICU transfer with daughter-in-law and Iraida BILLINGS  Code status:  DNR/DNI    Lines/tubes/drains:  - 2 PIVs    Disposition:  - Medical ICU     Patient seen and findings/plan discussed with medical ICU staff, Dr. Barraza.    Edilma Ovalle MD  Internal Medicine, PGY-1  [phone]    -----------------------------------------------------------------------    HISTORY PRESENTING ILLNESS:   Caryl Martin is a 70 y.o. F with past medical history of HFpEF, CVA, aortic stenosis and mitral stenosis, HTN, alcoholic cirrhosis (c/b portal hypertensive gastropathy, ascites, encephalopathy, esophageal varices, and bone marrow aplasia), DM type II, CKD stage IV, wheelchair-bound d/t CIDP who was initially admitted to the MICU on 7/28 after transfer from OSH for possible CRRT in setting of acute renal failure. At OSH, hospitalization c/b acute on chronic decompensated cirrhosis with bleeding esophageal varices (now s/p banding x5), septic shock, and progressive renal failure, likely provoked by ESBL UTI. Patient was hemodynamically stable on arrival to MICU 7/28 and transferred to floor 7/29. Pt completed 72hrs of octreotide gtt post-op via RIJ (removed 7/30) and continued on lactulose, rifaximin, and prophylactic ceftriaxone (7 day course finished 8/3). Continued on midodrine per hepatology.     Pt underwent 2 paracenteses while on the floor--negative for SBP. Was having oozing through GI tract without cuba bleeding until earlier today when she was noted to have large volume melena with soft pressures. Per nursing, mentation intact throughout.    Of note, patient was found to be increasingly encephalopathic 8/2 evening (did not receive lactulose that day). Stroke code was called for increased lethargy and unequal pupils per nursing; Neuro felt that suspicion for stroke low, no further workup was pursued. 8/4, noted to have symptomatic VRE UTI.       REVIEW OF SYSTEMS: + abdominal pain, hemoptysis, diarrhea;   negative for dizziness, chest pain, SOB    PAST MEDICAL HISTORY:   No past medical history on  file.  SURGICAL HISTORY:  Past Surgical History:   Procedure Laterality Date     IR FOLLOW UP VISIT INPATIENT  8/7/2020     IR PARACENTESIS  7/31/2020     IR PARACENTESIS  8/3/2020     SOCIAL HISTORY:  Social History     Socioeconomic History     Marital status:      Spouse name: Not on file     Number of children: Not on file     Years of education: Not on file     Highest education level: Not on file   Occupational History     Not on file   Social Needs     Financial resource strain: Not on file     Food insecurity     Worry: Not on file     Inability: Not on file     Transportation needs     Medical: Not on file     Non-medical: Not on file   Tobacco Use     Smoking status: Not on file   Substance and Sexual Activity     Alcohol use: Not on file     Drug use: Not on file     Sexual activity: Not on file   Lifestyle     Physical activity     Days per week: Not on file     Minutes per session: Not on file     Stress: Not on file   Relationships     Social connections     Talks on phone: Not on file     Gets together: Not on file     Attends [employer] service: Not on file     Active member of club or organization: Not on file     Attends meetings of clubs or organizations: Not on file     Relationship status: Not on file     Intimate partner violence     Fear of current or ex partner: Not on file     Emotionally abused: Not on file     Physically abused: Not on file     Forced sexual activity: Not on file   Other Topics Concern     Not on file   Social History Narrative     Not on file     FAMILY HISTORY:   Non-contributory     ALLERGIES:   Imuran  Ophthalmic prednisone      MEDICATIONS:  No current facility-administered medications on file prior to encounter.   acetaminophen (TYLENOL) 500 MG tablet, Take 1,000 mg by mouth every 8 hours as needed   allopurinol (ZYLOPRIM) 300 MG tablet, Take 300 mg by mouth daily  calcitRIOL (ROCALTROL) 0.25 MCG capsule, Take 0.25 mcg by mouth every Sunday, Tuesday, Thursday,  and Saturday.  calcitRIOL (ROCALTROL) 0.5 MCG capsule, Take 0.5 mcg by mouth Take 0.5 mcg by mouth every Monday, Wednesday, and Friday.  ferrous fumarate 65 mg, [place]. FE,-Vitamin C 125 mg (VITRON-C)  MG TABS tablet, Take 1 tablet by mouth daily  gabapentin (NEURONTIN) 300 MG capsule, Take 300 mg by mouth At Bedtime  guaiFENesin (ROBITUSSIN) 100 MG/5ML SYRP, Take 10 mLs by mouth every 4 hours as needed for cough   insulin aspart (NOVOLOG FLEXPEN) 100 UNIT/ML pen, Inject 2 Units Subcutaneous 3 times daily (before meals)  insulin glargine (LANTUS PEN) 100 UNIT/ML pen, Inject 14 Units Subcutaneous At Bedtime  ipratropium (ATROVENT) 0.06 % nasal spray, Spray 2 sprays in nostril daily  iron polysaccharides (NU-IRON) 150 MG capsule, Take 150 mg by mouth daily (with dinner)  levothyroxine (SYNTHROID/LEVOTHROID) 75 MCG tablet, Take 75 mcg by mouth daily  melatonin (MELATONIN MAXIMUM STRENGTH) 5 MG tablet, Take 5 mg by mouth At Bedtime  omeprazole (PRILOSEC) 20 MG DR capsule, Take 20 mg by mouth 2 times daily (before meals)  oxybutynin ER (DITROPAN XL) 15 MG 24 hr tablet, Take 15 mg by mouth daily  polyethylene glycol (MIRALAX) 17 g packet, Take 1 packet by mouth every other day  pramipexole (MIRAPEX) 0.125 MG tablet, Take 0.125 mg by mouth At Bedtime  predniSONE (DELTASONE) 5 MG tablet, Take 5 mg by mouth daily  sevelamer carbonate (RENVELA) 800 MG tablet, Take 1,600 mg by mouth 3 times daily (with meals)  Skin Protectants, Misc. (EUCERIN) cream, Apply topically At Bedtime  sodium bicarbonate 650 MG tablet, Take 1,300 mg by mouth 2 times daily  tamsulosin (FLOMAX) 0.4 MG capsule, Take 0.4 mg by mouth daily  vitamin D3 (CHOLECALCIFEROL) 50 mcg (2000 units) tablet, Take 1 tablet by mouth daily  nadolol (CORGARD) 20 MG tablet, Take 20 mg by mouth At Bedtime  potassium chloride ER (KLOR-CON M) 20 MEQ CR tablet, Take 20 mEq by mouth 2 times daily  torsemide (DEMADEX) 20 MG tablet, Take 60 mg by mouth 2 times daily  traMADol  (ULTRAM) 50 MG tablet, Take 50 mg by mouth every 6 hours as needed        PHYSICAL EXAMINATION:  Temp:  [95.1  F (35.1  C)-96.5  F (35.8  C)] 95.7  F (35.4  C)  Pulse:  [84-90] 90  Heart Rate:  [79-90] 79  Resp:  [16-20] 20  BP: ()/(30-77) 78/46  SpO2:  [96 %-100 %] 100 %  General: awake, alert, conversational, appears somewhat uncomfortable but in no acute distress  HEENT: sclerae anicteric, PERRL, EOMI, MM somewhat dry, tongue somewhat red-appearing, bilateral submandibular swelling present   Neuro: A&Ox3, no lateralizing symptoms or focal neurologic deficits  Pulm/Resp: Clear breath sounds anteriorly without rhonchi, crackles or wheeze, breathing non-labored  CV: RRR, 3/6 blowing systolic murmur present, no clicks or rubs  Abdomen: distended without fluid wave, mild diffuse tenderness, no masses appreciated  Extremities: venous stasis change bilateral LE, trace LE edema    LABS: Reviewed.   Complete Blood Count   Recent Labs   Lab 08/07/20  1400 08/07/20  0622 08/06/20  2224 08/06/20  0606 08/04/20  0721   WBC 10.9 13.0*  --  6.7 5.3   HGB 7.8* 8.7* 9.2* 8.2* 8.7*   PLT 76* 75*  --  59* 60*     Basic Metabolic Panel  Recent Labs   Lab 08/07/20 0622 08/06/20  0606 08/05/20  0653 08/04/20  0721    144 146* 149*   POTASSIUM 4.2 4.3 4.2 4.2   CHLORIDE 118* 120* 120* 122*   CO2 17* 18* 21 20   BUN 46* 42* 46* 56*   CR 2.44* 2.36* 2.32* 2.65*   * 159* 92 126*     Liver Function Tests  Recent Labs   Lab 08/07/20  0622 08/06/20  0606 08/05/20  0653 08/04/20  0721 08/03/20  0716 08/02/20  0804   AST 8  --   --  10 11 10   ALT 11  --   --  10 10 10   ALKPHOS 120  --   --  111 102 88   BILITOTAL 1.1  --   --  1.4* 1.3 1.4*   ALBUMIN 2.6*  --   --  3.1* 2.7* 2.7*   INR 1.64* 1.67* 1.54* 1.55* 1.48* 1.57*     Coagulation Profile  Recent Labs   Lab 08/07/20  0622 08/06/20  0606 08/05/20  0653 08/04/20  0721   INR 1.64* 1.67* 1.54* 1.55*       IMAGING:  Recent Results (from the past 24 hour(s))   XR Chest  Port 1 View    Narrative    EXAMINATION: XR CHEST PORT 1 VW, 8/6/2020 10:47 PM    INDICATION: Coughing up blood; hx of esophageal varices    COMPARISON: None    FINDINGS: 2 views of the chest. Trachea is midline. The  cardiomediastinal silhouette is within normal limits. No pleural  effusion. No pneumothorax.  Left basilar atelectasis. No pneumothorax.  Linear sclerotic foci overlying the posterior right rib 2. Destructive  and sclerotic changes of the left humeral head. Partially visualized  upper abdomen is within normal limits.      Impression    IMPRESSION:  1. Linear atelectasis in the left lung base.  2. Benign-appearing sclerotic change of posterior right rib 2 and  destructive and sclerotic changes of the left humeral head.    I have personally reviewed the examination and initial interpretation  and I agree with the findings.    JOHN HERNANDEZ MD   IR Follow Up Visit Inpatient    Narrative    PRE-PROCEDURE DIAGNOSIS: Ascites    POST-PROCEDURE DIAGNOSIS: Same    PROCEDURE: Limited abdominal ultrasound    Impression    IMPRESSION: Ultrasound-guided paracentesis requested for ascites.  Limited ultrasound scan of abdomen revealed trace ascites.  Paracentesis not completed due to increased risk for complication.      ----------    PERFORMED BY: GINA Hobbs PA-C

## 2020-08-07 NOTE — PROVIDER NOTIFICATION
Massiel crosscover resident paged regarding pt 100 ml bloody emesis. Stat RBC and type and screen ordered.

## 2020-08-07 NOTE — PROGRESS NOTES
"Social Work Services Progress Note    Hospital Day: 11  Date of Initial Social Work Evaluation:  7/31/20- please see for details  Collaborated with:  Chart review, team rounds, medical team, [facility] (p. [phone] or [phone], f. [phone])    Data:  \"Caryl Martin is a 70 y.o. F with past medical history of HFpEF, CVA, aortic stenosis and mitral stenosis, HTN, alcoholic cirrhosis (c/b portal hypertensive gastropathy, ascites, encephalopathy, esophageal varices, and bone marrow aplasia), DM type II, CKD stage IV with chronic anemia, morbid obesity, hypothyroidism, wheelchair-bound d/t CIDP, and depression who was admitted to [facility] MICU on 07/28/2020 [facility] in setting of acute renal failure\".      Pt has been accepted to [facility] and they have received insurance approval for pt to admit.     Per medical team pt will not be getting a feeding tube placed as pt is eating well. Pt was anticipated to be ready for discharge today but per team is not medically ready for discharge and Palliative has been consulted. Per chart review and team rounds pt is also newly on an octeotride drip.     Intervention:  Transport for today was cancelled via call to [employer]  [facility].     FABRIZIO contacted [facility] admissions- left  providing update on no discharge today and inquired about ability for pt to admit over the weekend if stable, waiting to hear back. Rimma later called back indicating that she will be working over the weekend so pt could admit if stable and asked to be updated. Rimma also noted anticipating bed availability through early next week as well.     Weekend admissions contact information: [phone], f. [phone].     Assessment:  See bedside RN, PT/OT, medical team notes    Plan:    Anticipated Disposition:  Facility:  [facility]- [facility]    Barriers to d/c plan:  Medical " stability    Follow Up:  TBD, SW will continue to follow    ALANNAH Aparicio, [address]   [phone] (pager) [phone]  8/7/2020

## 2020-08-07 NOTE — PLAN OF CARE
Discharge Planner SLP   Patient plan for discharge: Did not discuss  Current status: Pt seen for clinical swallow evaluation. She is alert, agrees to only a small amount given she remains nauseous and has been vomiting blood most of the night. Pt tolerated thin liquids, puree and regular solids. No overt s/sx of aspiration, no changes in breath sounds or vocal quality. Pt demonstrating adeqaute oral manipulation and clearance. Pt denies any pain or globus sensation. Pt also tolerated a number of pills of various sizes without s/sx of aspiration but reported fullness rather quickly.     Recommend pt continue with a regular diet and thin liquids  - implement general safe swallow and anti reflux precautions. Upright position, slow rate of intake, small bites/sips, eat/drink at a slow rate, remain upright for 60 minutes after PO intake, smaller more frequent meals.     Functional oropharyngeal phases of swallow. Evaluation only. SLP will sign off    Barriers to return to prior living situation: None per SLP  Recommendations for discharge: Defer to medical team, SLP not impacting discharge  Rationale for recommendations: Evaluation only. No further SLP services warranted          Entered by: Cris Martinez 08/07/2020 8:46 AM

## 2020-08-07 NOTE — PLAN OF CARE
7B/4C: Pt with transfer down to ICU after large amount of bloody stool. CXL PT. Will reschedule/continue to follow per POC.

## 2020-08-07 NOTE — PLAN OF CARE
Patient was found in bed with a very large amount of dark red/lillie stool. MD was paged and writer received a call back with stat lab orders. While cleaning patient up and turning her, a significant amount of red stool was profusely coming from rectum. BP was 83/36. MD paged again to see patient bedside ASAP and arrived shortly after. Rapid response was called. BP recheck was 89/40, 82/35, 83/30, 78/46. O2 was stable on RA. 1L LR was administered wide open. 2 units blood ordered stat, first unit administration started by rapid response nurseJacobo. MD ordered the patient to be transferred to ICU. Report was given to BERE Reinoso on unit 4C.

## 2020-08-07 NOTE — PROCEDURES
[facility] [place]    Central line    Date/Time: 8/7/2020 6:21 PM  Performed by: Chi Barraza MD  Authorized by: Chi Barraza MD   Indications: hemorrhagic shock.    UNIVERSAL PROTOCOL   Site Marked: NA  Prior Images Obtained and Reviewed:  NA  Required items: Required blood products, implants, devices and special equipment available    Patient identity confirmed:  Verbally with patient  NA - No sedation, light sedation, or local anesthesia  Confirmation Checklist:  Patient's identity using two indicators, relevant allergies, procedure was appropriate and matched the consent or emergent situation and correct equipment/implants were available  Time out: Immediately prior to the procedure a time out was called    Universal Protocol: the Joint Commission Universal Protocol was followed    Preparation: Patient was prepped and draped in usual sterile fashion           ANESTHESIA    Local Anesthetic: Lidocaine 1% without epinephrine      SEDATION    Patient Sedated: No      Preparation: skin prepped with ChloraPrep  Skin prep agent dried: skin prep agent completely dried prior to procedure  Sterile barriers: all five maximum sterile barriers used - cap, mask, sterile gown, sterile gloves, and large sterile sheet  Hand hygiene: hand hygiene performed prior to central venous catheter insertion  Location details: right internal jugular  Patient position: reverse Trendelenburg  Catheter type: triple lumen  Pre-procedure: landmarks identified  Ultrasound guidance: yes  Sterile ultrasound techniques: sterile gel and sterile probe covers were used  Number of attempts: 1  Successful placement: yes  Post-procedure: line sutured and dressing applied  Assessment: blood return through all ports and free fluid flow    PROCEDURE   Patient Tolerance:  Patient tolerated the procedure well with no immediate complications    Length of time physician/provider present for 1:1 monitoring during sedation:  0

## 2020-08-07 NOTE — PLAN OF CARE
Neuros: A&Ox4, sometimes forgetful. [place] 0, on scheduled lactulose.  Cardiac: WNL, denies chest pain.  Respiratory: Sat's well on RA. Denies SOB  Diet: Regular diet, 2 gram sodium restriction. Very poor appetite. Patient had some issues with swallowing at one point this shift, but has since improved greatly.   Activity: Assist of 2, total cares. Rolls well. Q2 turns. Refused one turn.  GI/: Incontinent of both bowel and bladder, patient unable to tell if she has voided, incontinence cares completed often. Barrier cream applied to R groin skin tear. Miconazole applied to groin per MAR  Lines: L PIV SL.   Skin: Blanchable redness buttocks and sacrum. No sacral mepilex d/t frequent loose stools. New skin tear cleaned and applied barrier cream. Scattered bruising.  Labs:   Pain: C/o back and shoulder chronic pain. Today majority of pain has been abdominal.   PRN: Oxycodone, not much relief. Given both zofran and compazine with no relief of nausea.  Plan: Held 8pm dosage of lactulose, patient has had over 3 BM's today and is alert. However, patient has had increase nausea and upper abdominal pain this shift. At 2310, patient had 100 ml bloody emesis. Chest X ray pending. Octreotide drip ordered.    Please make sure team can update Sofía BILLINGS over the phone with rounds.

## 2020-08-07 NOTE — PROGRESS NOTES
[facility]    Hepatology Follow-up    CC: Plan for RRT    Dx: Decompensated alcoholic cirrhosis with ascites, VH, HE   MARCELO on CKD   Clostridiodes difficile infection   Possible Ileus   History of alcohol use disorder, now 10 years sober                  Hypertension/hyperlipidemia/diabetes                  CIDP, wheelchair-bound    24 hour events:   Had episodes of hemoptysis overnight    Subjective:  New large hematochezia today  No nausea or vomiting  Complains of abdominal pain. No new fevers, sweats or chills.    Medications  Current Facility-Administered Medications   Medication Dose Route Frequency     cefTRIAXone  1 g Intravenous Q24H     gabapentin  300 mg Oral At Bedtime     insulin aspart  1-7 Units Subcutaneous TID AC     insulin aspart  1-5 Units Subcutaneous At Bedtime     insulin glargine  14 Units Subcutaneous At Bedtime     ipratropium  2 spray Nasal Daily     lactated ringers  1,000 mL Intravenous Once     [Held by provider] lactulose  20 g Oral BID     levothyroxine  75 mcg Oral QAM AC     lidocaine  1 patch Transdermal Q24H     lidocaine   Transdermal Q8H     linezolid  600 mg Oral Q12H MALCOLM     miconazole   Topical BID     [Held by provider] oxybutynin ER  15 mg Oral Daily     pantoprazole (PROTONIX) IV  40 mg Intravenous BID     pramipexole  0.125 mg Oral At Bedtime     predniSONE  5 mg Oral Daily     rifaximin  550 mg Oral BID     [Held by provider] sevelamer carbonate  1,600 mg Oral TID w/meals     sodium bicarbonate  1,300 mg Oral BID     sodium chloride (PF)  3 mL Intracatheter Q8H     sodium chloride (PF)  3 mL Intracatheter Q8H     sodium chloride (PF)  3 mL Intracatheter Q8H     tamsulosin  0.4 mg Oral Daily     vancomycin  125 mg Oral 4x Daily     vitamin D3  50 mcg Oral Daily       Review of systems  A 10-point review of systems was negative, unless stated above    Examination  BP (!) 88/63   Pulse 84   Temp 98.1  F (36.7  C) (Oral)   Resp 20   Ht 1.524 m  (5')   Wt 94.3 kg (207 lb 14.3 oz)   SpO2 100%   BMI 40.60 kg/m      Intake/Output Summary (Last 24 hours) at 8/7/2020 1600  Last data filed at 8/7/2020 1500  Gross per 24 hour   Intake 540 ml   Output 120 ml   Net 420 ml       General: Not in distress, afebrile  CVS: RRR  Resp: CTA  Abdomen: Obese, tympanitic, mild generalized tenderness  Extremities: mild edema  Neuro: AAO X 3, no clonus  Skin: Multiple bruises    Laboratory  Lab Results   Component Value Date     08/07/2020    POTASSIUM 4.2 08/07/2020    CHLORIDE 118 08/07/2020    CO2 17 08/07/2020    BUN 46 08/07/2020    CR 2.44 08/07/2020       Lab Results   Component Value Date    BILITOTAL 1.1 08/07/2020    ALT 11 08/07/2020    AST 8 08/07/2020    ALKPHOS 120 08/07/2020       Lab Results   Component Value Date    WBC 10.9 08/07/2020    HGB 7.8 08/07/2020     08/07/2020    PLT 76 08/07/2020       Lab Results   Component Value Date    INR 1.64 08/07/2020       MELD-Na score: 20 at 8/7/2020  6:22 AM  MELD score: 20 at 8/7/2020  6:22 AM  Calculated from:  Serum Creatinine: 2.44 mg/dL at 8/7/2020  6:22 AM  Serum Sodium: 143 mmol/L (Rounded to 137 mmol/L) at 8/7/2020  6:22 AM  Total Bilirubin: 1.1 mg/dL at 8/7/2020  6:22 AM  INR(ratio): 1.64 at 8/7/2020  6:22 AM  Age: 70 years 4 months      Assessment  70 year old female with medical history of hypothyroidism and hypertension as well as diabetic nephropathy with resultant CKD, chronic idiopathic demyelinating polyneuropathy and alcoholic cirrhosis now decompensated with variceal hemorrhage, ascites and encephalopathy; admitted to the hospital for renal replacement therapy given new development of MARCELO.  Prior to admission she had had an EGD on 7/27/2020 with placement of 5 bands for suspected variceal hemorrhage.  She has done well, however has recently been diagnosed with VRE UTI, as well as new C. difficile infection, hematochezia with hypotension concerning for repeat upper GI bleed.  EGD today  shows clots and blood in stomach and large post-banding ulcer in different stages of healing. No evidence of VH and no gastric or duodenal sources of bleeding were identified as well. We will treat conservatively    Recommendations  -- Stop Octreotide gtt  -- Stop Ceftriaxone  -- Use IV PPI 40 mg BID, can use PO if able to tolerate  -- Monitor Hgb and stool closely, transfuse if hgb <7  -- Use PO Vancomycin 125mg QID   Low threshold to use IV Metronidazole or AL Vancomycin given Ileus  -- Serial abdominal exams, low threshold to repeat AXR  -- Keep K and Mag optimal    Patient seen and discussed with attending physician, Dr. Gino Junior MD  PGY 6  Transplant Hepatology Fellow  [phone]

## 2020-08-07 NOTE — PROVIDER NOTIFICATION
08/07/20 1400   Call Information   Date of Call 08/07/20   Time of Call 1341   Name of person requesting the team Siobhan Dykes   Title of person requesting team RN   RRT Arrival time 1346   Time RRT ended 1445   Reason for call   Type of RRT Adult   Primary reason for call Sudden or unanticipated change in patient condition;Nurse is concerned/worried;Other   Other reason Uncontrolled bleeding   Was patient transferred from the ED, ICU, or PACU within last 24 hours prior to RRT call? No   SBAR   Situation Bleeding red blood per rectum,  BP 80/50   Background Cirrhosis, C-diff, Hep C   Notable History/Conditions End-Stage disease;Organ failure   Assessment Unstable BP, 80-94/ 39 -50   Interventions Fluid bolus;Labs;Other (describe)  (Started 1 unit of blood)   Adjustments to Recommend Transfer to ICU   Patient Outcome   Patient Outcome Transferred to  (Unit 4C)   RRT Team   Attending/Primary/Covering Physician Dr Valle   Date Attending Physician notified 08/07/20   Time Attending Physician notified 1501   Physician(s) Dr Alexis Guan RN Jacobo Anand

## 2020-08-07 NOTE — PLAN OF CARE
Discharge Planner OT   Patient plan for discharge: TCU  Current status: Pt completed BUE AAROM x 8; shoulder flexion, horizontal add/abd., shoulder add/abd, wrist flex/ext. Able to achieve functional range with assist. Pt reporting increased nausea with activity. Emitting small amount of bloody sputum, RN aware.  Barriers to return to prior living situation: Level of assist, impaired ADL  Recommendations for discharge: TCU  Rationale for recommendations: Pt below baseline for ADL/IADL and mobility. Would benefit from additional skilled therapy to address above deficits.        Entered by: Justina Coombs 08/07/2020 10:46 AM

## 2020-08-07 NOTE — PROGRESS NOTES
08/07/20 0836   General Information   Onset Date 07/28/20   Start of Care Date 08/07/20   Referring Physician Siobhan Espinoza MD    Patient Profile Review/OT: Additional Occupational Profile Info See Profile for full history and prior level of function   Patient/Family Goals Statement Feel better   Swallowing Evaluation Bedside swallow evaluation   Behaviorial Observations WFL (within functional limits)   Mode of current nutrition Oral diet   Type of oral diet Regular;Thin liquid   Respiratory Status Room air   Comments Caryl Martin is a 70 y.o. F with past medical history of HFpEF, CVA, aortic stenosis and mitral stenosis, HTN, alcoholic cirrhosis (c/b portal hypertensive gastropathy, ascites, encephalopathy, esophageal varices, and bone marrow aplasia), DM type II, CKD stage IV with chronic anemia, morbid obesity, hypothyroidism, wheelchair-bound d/t CIDP, and depression who was admitted to [facility]U on 07/28/2020 after transfer from [facility] for possible CRRT in setting of acute renal failure. At OSH, hospitalization was complicated by acute on chronic decompensated cirrhosis with bleeding esophageal varices (now s/p banding x5), septic shock, and progressive renal failure, likely provoked by ESBL UTI. In July of this year pt completed a video swallow at [facility] which was negative for aspiration. SLP was not consulted d/t concerns of vomiting blood last evening.    Clinical Swallow Evaluation   Oral Musculature generally intact   Structural Abnormalities none present   Dentition present and adequate   Laryngeal Function Cough;Swallow;Voicing initiated   Additional Documentation Yes   Swallow Eval   Feeding Assistance minimal assistance required   Clinical Swallow Eval: Thin Liquid Texture Trial   Mode of Presentation, Thin Liquids cup;straw;self-fed   Volume of Liquid or Food Presented 8 oz   Oral Phase of Swallow WFL   Pharyngeal Phase of Swallow intact   Diagnostic Statement No overt s/sx  of aspiration, no changes in breath sounds or vocal quality    Clinical Swallow Eval: Puree Solid Texture Trial   Mode of Presentation, Puree spoon;fed by clinician   Volume of Puree Presented 1 oz   Oral Phase, Puree WFL   Pharyngeal Phase, Puree intact   Diagnostic Statement Adequate oral manipulation, no s/sx of aspiration    Clinical Swallow Eval: Solid Food Texture Trial   Mode of Presentation, Solid self-fed   Volume of Solid Food Presented 1/2 cracker  (limited trials d/t pt reporting nausea)   Oral Phase, Solid WFL   Pharyngeal Phase, Solid intact   Diagnostic Statement Adequate oral manipulation, good clearance, no overt s/sx of aspiration. Pt denies any difficulty swallowing but reports nausea   Swallow Compensations   Swallow Compensations Alternate viscosity of consistencies;Effortful swallow;Pacing;Multiple swallow  (Anti reflux precautions )   Results No difficulties noted   Esophageal Phase of Swallow   Patient reports or presents with symptoms of esophageal dysphagia Yes   Esophageal comments Esophageal varices s/p banding, vomiting blood overnight    Swallow Eval: Clinical Impressions   Skilled Criteria for Therapy Intervention No problems identified which require skilled intervention   Dysphagia Outcome Severity Scale (KALEN) Level 6 - KALEN   Treatment Diagnosis Functional oropharyngeal phases of swallow. Esophgeal and gastric appear to be the cause of her current difficulty   Diet texture recommendations Regular diet;Thin liquids   Recommended Feeding/Eating Techniques alternate between small bites and sips of food/liquid;hard swallow w/ each bite or sip;maintain upright posture during/after eating for 30 mins;small sips/bites  (Remain upright for 60 minutes after eating)   Anticipated Discharge Disposition   (Defer to medical team )   Risks and Benefits of Treatment have been explained. Yes   Patient, family and/or staff in agreement with Plan of Care Yes   Clinical Impression Comments Pt seen for  clinical swallow evaluation. She is alert, agrees to only a small amount given she remains nauseous and has been vomiting blood most of the night. Pt tolerated thin liquids, puree and regular solids. No overt s/sx of aspiration, no changes in breath sounds or vocal quality. Pt demonstrating adeqaute oral manipulation and clearance. Pt denies any pain or globus sensation. Pt also tolerated a number of pills of various sizes without s/sx of aspiration but reported fullness rather quickly. Recommend pt continue with a regular diet and thin liquids  - general safe swallow and anti reflux precautions. Upright position, slow rate of intake, small bites/sips, eat/drink at a slow rate, remain upright for 60 minutes after PO intake, smaller more frequent meals.    Total Evaluation Time   Total Evaluation Time (Minutes) 18

## 2020-08-07 NOTE — PROGRESS NOTES
[facility], [place]    Progress Note - [facility] 2 Service        Date of Admission:  7/28/2020    Assessment & Plan   Caryl Martin is a 70 y.o. F with past medical history of HFpEF, CVA, aortic stenosis and mitral stenosis, HTN, alcoholic cirrhosis (c/b portal hypertensive gastropathy, ascites, encephalopathy, esophageal varices, and bone marrow aplasia), DM type II, CKD stage IV with chronic anemia, morbid obesity, hypothyroidism, wheelchair-bound d/t CIDP, and depression who was admitted to [facility] MICU on 07/28/2020 after transfer from [facility] for possible CRRT in setting of acute renal failure. At OSH, hospitalization was complicated by acute on chronic decompensated cirrhosis with bleeding esophageal varices (now s/p banding x5), septic shock, and progressive renal failure, likely provoked by ESBL UTI. Patient has remained hemodynamically stable since arrival and transferred to floor on 07/29 with continued renal failure but improving serum creatinine. Renal felt no need for HD at this time. UOP has remained minimal. Has been continued on midodrine, per Hepatology.    Regarding EV bleed, patient completed 72hrs of octreotide gtt post-op via RIJ (removed 07/30) and has been continued on lactulose, rifaximin, and prophylactic ceftriaxone (7-day course finished 08/03). Underwent paracentesis with 3L removed by IR and albumin replacement on 07/31. Has had some continued oozing through GI tract but no cuba bleeding. Patient increasingly encephalopathic as of 08/02 evening (did not receive daytime lactulose). Stroke code was called on 08/02 evening for increased lethargy and unequal pupils per nursing; Neuro felt that suspicion for stroke low, no further workup pursued. Repeat 2L paracentesis on 08/03 negative for SBP and rectal lactulose initiated. Completed 7-day course of ceftriaxone on 08/03. Patient with increased chest discomfort on 08/06, coughed up ~100mL blood on  early morning of 08/07, CXR unremarkable. Repeat para attempted 08/07 but no ascites noted, dilated loops of bowel seen. Palliative Care to see.    Symptomatic VRE UTI noted 08/04 so linezolid started. Repeat 4L para planned for today 08/07.    Updates:   - Repeat para attempted, unable to retrieve any ascites fluid  - Check abd XR and C diff PCR given new leukocytosis and concern for ileus  - Patient coughed up small amount of blood overnight - per GI, not concerning  - Stop octreotide  - Formal swallow eval today  - Continue linezolid for VRE UTI  - Continue lactulose/rifaximin for HE, lacutlose BID  - Monitor UOP, PVRs, straight cath >400mL    #Hepatic encephalopathy  #Delirium  #Ascites  #Acute on chronic decompensated EtOH cirrhosis  #Alcohol use disorder in remission  Patient was admitted to OSH on 07/15 with confusion and difficulty speaking; brain MR showed mild chronic microvascular ischemic changes within cerebral white matter and small chronic cerebellar infarcts with no acute findings. Ultimately confusion was thought to be 2/2 ESBL UTI for which she completed course of ertapenem and Flagyl. At OSH, ammonia was slightly elevated to 40 and mental status improved with lactulose. On admission, ammonia WNL and lactulose discontinued. Patient was initially able to recall details of her hospitalization with occasional difficulty but overall appears mentally very clear and able to converse easily. Currently A&Ox4. She is certainly at higher risk for delirium, however, so would maintain precautions. S/p large volume paracentesis (5L) on 07/19 and 07/24; per family, has had ~6 volodymyr over past few months. Abdominal US on 07/29 without concerning findings; showing portal HTN, splenomegaly, and diffuse gallbladder wall thickening (possible adenomyomatosis or cholesterolosis). Completed courses of ertapenem and Flagyl on 07/23. No active infection. SBP ppx with ceftriaxone given bleeding varices as above. S/p repeat  para (3L) by IR with 50g albumin replacement on 07/31; fluid sent for diagnostics and negative for SBP. Has had modest PO intake. Stroke code activated at 1730 on 08/02 for unequal pupils, lethargy, and patient slumped over in chair per nursing; Neuro felt that suspicion low for stroke so no further workup pursued. Patient became increasingly encephalopathic 08/03 with ammonia 194, likely exacerbated by oozing from banded EV sites and missed lactulose doses on 08/02. Repeat 2L para on 08/03 negative for SBP, albumin replacement given. Mental status improved with resumption of lactulose, patient no longer encephalopathic. Finished 7-day course of ceftriaxone on 08/03.   - Delirium precautions  - PRN volodymyr for comfort (always w/ diagnostic studies except cytology +/- w/ albumin replacement) - may need regularly (weekly) as outpatient   - Para attempted today but no ascites fluid retrieved, no albumin replacement needed  - Stop lactulose, start Miralax 17g BID instead  - Continue rifaximin  - NPO until formal swallow eval today    - Can possibly resume calorie counts and regular diet with Na restriction to 2000 mg daily, 1.5 mg/kg/day protein thereafter  - PT/OT  - Palliative Care consult  - Check TSH, T4    MELD-Na score: 20 at 8/7/2020  6:22 AM  MELD score: 20 at 8/7/2020  6:22 AM  Calculated from:  Serum Creatinine: 2.44 mg/dL at 8/7/2020  6:22 AM  Serum Sodium: 143 mmol/L (Rounded to 137 mmol/L) at 8/7/2020  6:22 AM  Total Bilirubin: 1.1 mg/dL at 8/7/2020  6:22 AM  INR(ratio): 1.64 at 8/7/2020  6:22 AM  Age: 70 years 4 months     #Leukocytosis  WBC up to 13 on 08/07. Patient afebrile, being treated for UTI. Dilated loops of bowel seen on ultrasound today during para attempt.   - Check abdominal XR to r/o ileus   - If c/w ileus,  place rectal tube and turn patient on side  - Start Miralax 17g BID instead of lactulose  - Check C diff PCR  - Recheck WBC in a.m.    #Acute on chronic normocytic anemia   #Esophageal  varices s/p banding x5  #Hypovolemic shock, resolved  #Thrombocytopenia of chronic liver disease   At OSH, initially thought septic shock 2/2 UTI. Hgb dropped 8.4 --> 7.3 --> 6.4 at so received 3-4u pRBCs while there and another en route to [facility]. Hypovolemic shock precipitated by BRBPR and bleeding esophageal varices, which were banded x5 on 07/27. Required intermittent BP support with Levophed at OSH (weaned off 07/29 morning). Patient reportedly had episode of hematemesis morning prior to transfer. On admission, had episode of melena on 07/28 night. Hgb has been stable while in ICU, 8.1 --> 8.1 --> 7.9. Consent for PRN transfusion obtained via phone with POA; PIV in place. Plts 58 on admission, consistent with recent baseline; improving slightly. Has not required pressors here. Finished 72hrs of octreotide gtt and RIJ removed on 07/31. Completed 7-day course of ceftriaxone on 08/03. Platelets stable in the 50s-70s. Hgb stable at ~8.5. 7-day course of ceftriaxone completed 08/03. Patient with increased chest discomfort on 08/06, coughed up ~100mL blood on early morning of 08/07 so octreotide restarted, CXR unremarkable. Hgb stable at 9.2.  - GI Hepatology consulted, signed off - no concerns about minor mucosal bleeding   - Stop octreotide   - Continue PO pantoprazole BID   - Monitor stool output   - Monitor Hgb BID, transfuse if <7  - Zofran, Maalox, and Tums PRN  - Incentive spirometry   - NPO until formal SLP swallow eval today    - Can potentially resume calorie counts and regular diet with Na restriction to 2000 mg daily, 1.5 mg/kg/day protein thereafter    #VRE UTI  Patient now endorsing sharp/shooting pains in perineal region and 08/04 UA concerning for UTI. Was initially admitted to OSH for ESBL UTI, which provoked ARF and HE. Urine culture growing 50-100k vanco-resistant Enterococcus and 10-50k Candida parapsilosis complex.  - Continue linezolid 600 mg BID    #Oliguric MARCELO on CKD stage IV  Baseline Cr 2-3.  At OSH, Cr 5.44 with minimal UOP on 07/28 and renal US negative for mass or hydronephrosis. MARCELO likely multifactorial (prerenal and intrinsic renal) from ATN (shock). Potassium WNL. UOP has been minimal. Per Renal, likely not HRS so albumin challenge not needed. Serum creatinine downtrending, albeit slowly. Octreotide stopped and RIJ removed 07/30. Wellington removed 08/01, some urinary retention since then. Na up to 149 on 08/04 so 1L NS bolus given, improved to 146 on 08/05. Midodrine discontinued, BPs stable.  - Nephrology consult appreciated, signed off  - Avoid nephrotoxins  - Continue PTA sodium bicarb 1300 mg BID  - Continue PTA sevelamer 1600 mg TID with meals  - Monitor UOP, PVR/bladder scan qshift, straight cath >400mL    #HFpEF  PTA on torsemide. Echo at OSH from 07/20 showed EF >75%, sclerotic mitral valve with trace MR. Elevated EF c/w high-output heart failure in setting of cirrhosis with ascites. Was continued on torsemide and started on spironolactone at OSH, but both were later held 2/2 hypotension requiring pressor support (weaned 07/28 morning).  - Hold PTA torsemide for now     #IDDM  PTA on insulin glargine 14u QHS. Well-controlled, A1c 5.3% on 07/15. At OSH, was on medium SSI and 7u glargine QHS.   - Continue medium dose sliding scale insulin   - Continue home dose glargine 14u at bedtime   - Hypoglycemia protocol      #Hypothyroidism   PTA on levothyroxine. TSH 16.58, free T4 0.78 @ OSH.   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 At OSH, received hydrocortisone 50mg Q6H for stress dosing in setting of hypotension. Weaned stress dosed hydrocortisone as patient has been normotensive. No RLS symptoms reported since admission.  - Hold home dose of 5 mg prednisone daily while NPO  - Hold PTA gabapentin and pramipexole while NPO    #Bone mineral disorder  #Osteoarthritis  PTA on sevelamer carbonate for  "BMD. OA longstanding, primarily in shoulders and right knee. Patient does not want APAP. Regarding BMD, Ca at goal. Having increased pain after therapies. P now low at 1.8.  - Hold sevelamer   - Replace phos  - Pain management: topical lidocaine/capsaicin and low-dose oxycodone  - PT/OT    #Intertrigo  Rash diffusely under arms, breasts, pannus, and groin per nursing.  - Micatin powder PRN     #Gout  - Hold PTA allopurinol      Diet: NPO  Fluids: None  Lines: L PIV  DVT Prophylaxis: Pneumatic Compression Devices  Wellington Catheter: not present  Code Status: DNR/DNI       Disposition Plan   Expected discharge: 2-4 days, recommended to TCU once TCU placement established.  Entered: Edilma Roque 08/07/2020, 11:55 AM     The patient's care was discussed with the Primary team.    Edilma Roque, MS4  Medicine - [facility] 2  Pager [phone]    I saw the patient in conjunction with my MS4, Edilma Roque, and in this documention reflects our shared examination and decision making with my edits in blue. Patient was staffed with my attending, Dr Paul Kay.    Siobhan Espinoza MD  Internal Medicine & Pediatrics PGY4  Pager: [phone]    ______________________________________________________________________    Interval History   Patient failed bedside 3oz water test on 08/06 evening. She coughed up ~100mL blood overnight around 0300 on 08/07 so octreotide restarted, CXR unremarkable.     This morning, patient feels unwell with persistent abdominal pain, nausea, and back pain. She voices concern that she is not getting better. Caryl's goals of care were discussed and she wishes to continue curative cares at this time, though understands that \"a time will come\" when some cares may be of minimal utility. She agrees that Palliative Care consult would be helpful (believes they have met before) and requests that team speaks with Iraida again today.    Data reviewed today: I reviewed all medications, new labs and imaging results over the last 24 " hours. I personally reviewed new labs.    Physical Exam   Vital Signs: Temp: 95.7  F (35.4  C) Temp src: Oral BP: (!) 101/37(MAP 64) Pulse: 90 Heart Rate: 85 Resp: 16 SpO2: 100 % O2 Device: None (Room air)    Weight: 207 lbs 14.3 oz  General: Alert, interactive, laying supine in bed  HEENT: Bilateral submandibular swelling  CV: Regular rate and rhythm, blowing 3/6 holosystolic murmur, no clicks or rubs  Resp: Clear to auscultation of apices bilaterally  Abd: Firm, distended, tympanic BS, mild diffuse tenderness to palpation. Large midline hernia and subumbilical healed surgical scar  Skin: Dry flaking skin over feet. Chronic venous stasis changes to BLEs  Neuro: Alert and oriented to self, place, and time. Follows commands  Psych: Appropriate affect, intermittently tearful    Data    XR Chest (08/06/2020):  1. Interstitial airspace opacities, may represent mild pulmonary  edema.  2. Linear atelectasis in the left lung base.  3. Sclerotic change of posterior right rib 2 and destructive and  sclerotic changes of the left humeral head.    Recent Labs   Lab 08/07/20  0622 08/06/20  2224 08/06/20  0606 08/05/20  0653 08/04/20  0721   WBC 13.0*  --  6.7  --  5.3   HGB 8.7* 9.2* 8.2*  --  8.7*   *  --  102*  --  101*   PLT 75*  --  59*  --  60*   INR 1.64*  --  1.67* 1.54* 1.55*     --  144 146* 149*   POTASSIUM 4.2  --  4.3 4.2 4.2   CHLORIDE 118*  --  120* 120* 122*   CO2 17*  --  18* 21 20   BUN 46*  --  42* 46* 56*   CR 2.44*  --  2.36* 2.32* 2.65*   ANIONGAP 8  --  6 6 7   LUIS ALFREDO 8.5  --  8.3* 8.6 8.6   *  --  159* 92 126*   ALBUMIN 2.6*  --   --   --  3.1*   PROTTOTAL 5.7*  --   --   --  6.3*   BILITOTAL 1.1  --   --   --  1.4*   ALKPHOS 120  --   --   --  111   ALT 11  --   --   --  10   AST 8  --   --   --  10

## 2020-08-07 NOTE — PROGRESS NOTES
Critical Care Services Progress Note:  I personally examined and evaluated the patient today. I formulated today s plan with the house staff team or resident(s) and agree with the findings and plan in the associated note (see separately attested resident note).   I have evaluated all laboratory values and imaging studies from the past 24 hours.  Summary of hospital course:  70F h/o hfpef, ao and mitral stenosis, etoh cirrhosis, stage 4 ckd (now requiring HD), and cidp (wheelchair bound), s/p banding of bleeding varices on 7/27, now returns to the ICU after a bout of melena and hypotension (recovered with 1L LR and 2 U prbc) on 8/7.    Overnight events/pertinent findings today:  On my visit she is doing well.  Complains of abdominal pain to the epigastric area which she says has been present for at least several days (abd US 7/29 does not reveal etiology).  She feels naseated but hasn't vomited.  She agrees that she recently had two black/red stools.  No dizzyness/lightheadedness at this time.    Data  Initially hypotensive to 70s/40s but responded to blood and fluids, bp now 94/51.  satting well on room air.  Labs (personally reviewed): lytes ok.  Creat elevated to 2.44.  Tbili ok 1.1.  Lactate ok 1.8.  hgb 9.0 post transfusion (from 7.8).  pltlts 76 acceptable.  inr 1.64 acceptable.  Of note her c diff test is positive.    Assessment/plan:  1.  Melena/brbpr:  Now scoped by GI; no apparent source of bleeding from above, but melena continues.  Will place central line for access (see separate documentation).  pltlts and INR ok.  Responded appropriately to transfusion.  On PPI.  2.  Threatened hemorrhagic shock:  In setting of GI bleeding noted above.  Responding well to blood transfusions.  High risk for decompensation.  3.  c diff infection:  GI team concerned for ileus.  Changing treatment to IV metronidazole.  Rest per resident note.   I spent a total of 35 minutes (excluding procedure time) personally providing  and directing critical care services at the bedside and on the critical care unit for this patient.     Chi Barraza

## 2020-08-07 NOTE — OR NURSING
Writer assisted GI team to perform Upper endoscopy at bedside room 4412. Consent had been obtained from patient prior to arrival to room. Set up travel cart for procedure. Staff RN administered sedation and monitored patient during and after exam. No interventions provided.

## 2020-08-07 NOTE — PROGRESS NOTES
Patient Name: Analy Martin  Medical Record Number: [id number]  Today's Date: 8/7/2020    Procedure: paracentesis  Proceduralist: [place] PA-C    Procedure Start: 1015  Procedure end: 1018      Report given to: RN     Pt arrived to IR room 7 from inpatient unit. Consent reviewed. Pt denies any questions or concerns regarding procedure. Pt positioned supine and monitored per protocol. Procedure cancelled per provider, labs not collected and not sent. Pt transferred back to inpatient unit.

## 2020-08-07 NOTE — PLAN OF CARE
[id number]    /47 (BP Location: Left arm)   Pulse 90   Temp 95.1  F (35.1  C) (Oral)   Resp 18   Ht 1.524 m (5')   Wt 94.3 kg (207 lb 14.3 oz)   SpO2 98%   BMI 40.60 kg/m      Neuros: A&Ox4, sometimes forgetful. [place] 0, no PRN lactulose.  Cardiac: WNL, denies chest pain.  Respiratory: Sat's well on RA. Denies SOB  Diet: Regular diet, 2 gram sodium restriction.   Activity: Assist of 2, total cares. Rolls well. Q2 turns.  GI/: Incontinent of both bowel and bladder, patient unable to tell if she has voided. Barrier cream to R groin skin tear. Miconazole to groin per MAR  Lines: L PIV SL.   Skin: Blanchable redness buttocks and sacrum. No sacral mepilex d/t frequent loose stools. New skin tear cleaned and applied barrier cream. Scattered bruising.  Labs:   Pain: C/o abdominal discomfort.    PRN: Oxycodone, not much relief.  Plan: Octreotide IV push x 1 dose and then started on Octreotide drip.  No nausea overnight.  Pt moving moderate amount of lillie color sputum throughout the night.  VSS on RA.

## 2020-08-08 NOTE — PROGRESS NOTES
MICU Attending Note:  August 8, 2020    I saw and examined the patient with the resident and fellow during daily multidisciplinary round. I reviewed the labs, imaging studies and cultures.  Case discussed with family members at bed side.  Please see MICU resident's note from (August 8, 2020) which we formulated together and represent our mutual findings, assessment and plan.   Key/major active problems.as follows;  The patient is critically ill due to;  -ETOH cirrhosis on miralax  -GIB requiring blood transfusion, s/p banding x 5 on 7/27, repeat EGD did not show active bleed, trending H&H, on IV PPI, followed by GI  -C diff colitis, on PO Vanco  -MARCELO, CKD, monitor renal functions  -CIDP, resume steroids    Critical care time is 35 min excluding procedure time.    H. Nallely Celaya MD  [phone]

## 2020-08-08 NOTE — PLAN OF CARE
Admitted/transferred from: 7D at 1430  Reason for admission/transfer:  Need for EGD and increased monitoring  Patient status upon admission/transfer: AO, blood running in PIV, hypotensive, questionable IV access  Interventions: EGD done, 1 unit blood given, 1L  LR bolus, 500ml NS bolus, 500ml LR bolus  Plan:  Monitor hgb q6, clear liquids ok, MAP goal>60  2 RN skin assessment: completed by Pema Pierce and Alexandra JOHNS  Result of skin assessment and interventions/actions: pink bottom, needs protective mepilex  Height, weight, drug calc weight: Done  Patient belongings (see Flowsheet - Adult Profile for details):  in bag in room  MDRO education (if applicable):  yes

## 2020-08-08 NOTE — PLAN OF CARE
ICU End of Shift Summary. See flowsheets for vital signs and detailed assessment.    Changes this shift:  Pt on RA, AO. Up to chair with sling for 2 hours.  MAPs>60, BP improving throughout day. Continues with large amounts of melena output, cramping and gas pains. No c/o nausea. Incont. Urine X 1.  Diet advanced to full liquid, tolerating well.    Plan:  CBC q8hrs. Pt requesting mirapex for restless leg, ordered and will give early.

## 2020-08-08 NOTE — PROGRESS NOTES
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     24hr: Hematemesis yesterday 1U PRBC last night, 1U Plt this morning, EGD yesterday - shows clots and blood in stomach and large post-banding ulcer in different stages of healing per GI. Stopped octreotide. HD stable     CHANGES and MAJOR THINGS TODAY:   - Cont PO vanc   - Cont Linazolid for VRE UTI   - q6h CBC   - Check fibrinogen this morning   - Goal h/h <7, Plts <50, Fibrinogen <100     PLAN:    Neuro:  # Hepatic encephalopathy  # Delirium, resolved  Initially admitted to OSH for confusion, difficulty speak, ultimately thought 2/2 ESBL UTI. Mentation clear on admission to ICU here on 7/28 and upon transfer to floor. Stroke code activated 8/2 for unequal pupils, lethargy--low suspicion for stroke per neuro, no further workup pursued. Patient became increasingly encephalopathic 8/3 with ammonia 194, likely exacerbated by oozing from banded EV sites and missed lactulose doses on 8/2--improved with resumption of lactulose 8/3 and has been mentally clear since.   - Delirium precautions   - Will hold off on further lactulose in setting of C. 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 At OSH, received hydrocortisone 50mg Q6H for stress dosing in setting of hypotension. Weaned stress dosed hydrocortisone as patient has been normotensive. No RLS symptoms reported since admission.  -  home dose 5 mg prednisone ---> Cont  4 mg IV methylprednisolone in the setting of NPO  - Hold PTA gabapentin and pramipexole while NPO    Pulmonary:  No active issues.  Sats in upper 90s on room air.   Encourage pulmonary toilet     Cardiovascular:  # Transient hypotension in setting of GI bleed  Pt with MAPs<65 after large volume melena. Recheck of BP after repositioning of cuff and 1L LR improved.   - Additional 1L LR bolus  - 1 U pRBCs yesterday, 1U plt overnight     # Chest pain  Pt reported increased chest discomfort on 8/6. CXR and EKG without acute changes. Pt now attributing pain to coughing/spitting up sour-tasting sputum with blood.   - Monitor     # HFpEF  PTA on torsemide. Echo at OSH from 07/20 showed EF >75%, sclerotic mitral valve with trace MR. Elevated EF c/w high-output heart failure in setting of cirrhosis with ascites. Was continued on torsemide and started on spironolactone at OSH, but both were later held 2/2 hypotension.  - Continue holding PTA torsemide given hypotension     GI/Nutrition:  # Melena/hematochezia, hemoptysis   # Acute on chronic decompensated alcoholic cirrhosis c/b esophageal varices s/p banding x5  # Alcohol use disorder in remission   Pt with acute on chronic decompensated alcoholic cirrhosis with esophageal varices requiring placement of 5 bands at OSH on 7/27. She completed 7 day course of ceftriaxone on 8/3 for SBP ppx. Per family, pt has had increasing need for therapeutic paracenteses over past several months. Last para here on 8/3, attempted again 8/7 but no ascites noted. Pt reportedly coughed up ~100mL blood 8/6 PM and ~1400 8/7 had large volume melena overall concerning for recurrent GI bleed in setting of  recently diagnosed UTI and C. Diff infection. Octerotide had been stopped 8/6 evening but was restarted 8/7 AM.   - GI consult for EGD--done 8/7 @1630, visualized clot in stomach, large post-banding ulcers but no bleeding varices, gastric or duodenal hemorrhage  - Ceftriaxone restarted on transfer--per GI, OK to discontinue   - Holding Octreotide --per GI no e/o variceal bleed   - Cont IV pantoprazole 40 mg BID  - Monitor Hgb Q6 hrs, transfuse if hgb <7  -  INR, transfuse for goal INR >2, hgb>7, platelets>50K, Fibrinogen <100    MELD-Na score: 21 at 8/8/2020  3:56 AM  MELD score: 21 at 8/8/2020  3:56 AM  Calculated from:  Serum Creatinine: 2.50 mg/dL at 8/8/2020  3:56 AM  Serum Sodium: 139 mmol/L (Rounded to 137 mmol/L) at 8/8/2020  3:56 AM  Total Bilirubin: 1.0 mg/dL at 8/8/2020  3:56 AM  INR(ratio): 1.67 at 8/8/2020  3:56 AM  Age: 70 years 4 months    # C. Diff colitis 8/7+  # Concern for ileus  Pt with abdominal pain for past 2-3 days.   Therapeutic para attempted 8/7 AM, minimal ascites. AXR 8/7 with multiple dilated lops of bowel concerning for ileus. See ID.  - Cont PO vancomycin 125 mg QID as below  - Monitor K and Mg closely, replete as needed    # Nutrition  Clear liquid diet, no reds    Renal/Fluids/Electrolytes:  # Oliguric MARCELO on CKD stage IV  Baseline Cr 2-3 at OSH, 5.44 on admission 7/28. Renal US negative for mass or hydronephrosis. UOP minimal.   -Scr back at baseline of 2.5 this am   - Avoid nephrotoxins   - Hold PTA sodium bicarb 1300 mg BID while NPO  - Hold PTA sevelamer while NPO    # Urinary retention   [place] removed 8/1, has had some urinary retention since then requiring intermittent straight cath for >400 mL on bladder scan.   - Will continue to monitor   - Hold tamsulosin while PO  -Bladder Scan Q shift     # Hypophosphatemia   P 1.8 on 8/7.   - Replete  - Monitor phos daily     Endocrine:  # IDDM  PTA on insulin glargine 14u QHS. Well-controlled, A1c 5.3% on 7/15.   - Continue medium dose  sliding scale insulin   - Continue home dose glargine 14u at bedtime   - Hypoglycemia protocol      # Hypothyroidism   PTA on levothyroxine. TSH 16.58, free T4 0.78 @ OSH.   - Changed PTA 75 mcg levothyroxine to 37.5 mcq IV while NPO per pharmacy    # Bone mineral disorder  PTA on sevelamer carbonate for BMD  - Sevelamer held while NPO    ID:  # C. difficile colitis   Pt with 2-3 days of abdominal pain, large volume melena 8/7 afternoon. Has been incontinent of bowel on scheduled lactulose while on the floor. Pt afebrile with leukocytosis to 13 8/7 and C diff PCR came back positive on transfer to ICU 8/7.   -improving leukocytosis today 5.2  - Cont PO vancomycin 125 mg QID and monitor how she tolerates this--low threshold for IV Metronidazole or ID Vancomycin given ileus  - Serial abdominal exams, low threshold for repeat AXR    # VRE UTI  Pt with sharp/shooting pains in perineal region 8/4 and UA concerning for UTI. Urine culture growing 50-100k vanco-resistant Enterococcus and 10-50k Candida parapsilosis complex.  - Continue linezolid 600 mg BID until 8/12 for total of 5 days     Hematology:    # Leukocytosis   13 on 8/7, suspect 2/2 C. Diff as above.      # Acute on chronic normocytic anemia   Hgb drop from 8.7 to 7.8 on 8/7, received 1 U pRBCs in setting of large volume melena, hgb recheck 9 after transfusion.   - Trend, q6h CBC  - Transfuse for hgb<7    # Thrombocytopenia of chronic liver disease   Plts have been 60-70s since admission.   - Transfuse <50 with bleed    Musculoskeletal:  # Osteoarthritis  OA longstanding, primarily in shoulders and right knee. Patient does not want APAP. Having increased pain after therapies.   - Pain management: topical lidocaine/capsaicin, hold oxycodone while NPO  - PT/OT    # Gout  - Hold PTA allopurinol while NPO    Skin:  # Intertrigo  Rash diffusely under arms, breasts, pannus, and groin per nursing.  - Micatin powder PRN      General Cares/Prophylaxis:    DVT Prophylaxis:  Pneumatic Compression Devices  GI Prophylaxis: PPI  Restraints: none  Family Communication: Discussed ICU transfer with daughter-in-law and Iraida BILLINGS  Code status: DNR/DNI    Lines/tubes/drains:  - 2 PIVs    Disposition:  - Medical ICU, can most likely transfer to floor tomorrow if H/H remain stable     Patient seen and findings/plan discussed with medical ICU staff, Dr. Yomi Cardozo  MICU LOPEZ     -----------------------------------------------------------------------  PHYSICAL EXAMINATION:  Temp:  [95.7  F (35.4  C)-98.1  F (36.7  C)] 97.7  F (36.5  C)  Pulse:  [64-90] 81  Heart Rate:  [64-90] 80  Resp:  [14-20] 16  BP: ()/(25-89) 92/43  SpO2:  [95 %-100 %] 100 %     General: awake, alert, conversational, no acute distress  HEENT: sclerae anicteric, PERRL, EOMI, MM somewhat dry, tongue somewhat red-appearing, bilateral submandibular swelling present   Neuro: A&Ox3, no lateralizing symptoms or focal neurologic deficits  Pulm/Resp: Clear breath sounds anteriorly without rhonchi, crackles or wheeze, breathing non-labored  CV: RRR, 3/6 blowing systolic murmur present, no clicks or rubs  Abdomen: distended without fluid wave, mild diffuse tenderness, no masses appreciated  Extremities: venous stasis change bilateral LE, trace LE edema    LABS: Reviewed.   Complete Blood Count   Recent Labs   Lab 08/08/20  0356 08/07/20  2349 08/07/20  2016 08/07/20  1738  08/07/20  1400   WBC 5.4 6.1 6.5  --   --  10.9   HGB 7.4* 8.1* 8.1* 8.5*   < > 7.8*   PLT 57* 49* 54*  --   --  76*    < > = values in this interval not displayed.     Basic Metabolic Panel  Recent Labs   Lab 08/08/20  0356 08/07/20 2016 08/07/20  0622 08/06/20  0606    143 143 144   POTASSIUM 4.3 4.6 4.2 4.3   CHLORIDE 111* 116* 118* 120*   CO2 18* 18* 17* 18*   BUN 52* 52* 46* 42*   CR 2.50* 2.54* 2.44* 2.36*   * 163* 136* 159*     Liver Function Tests  Recent Labs   Lab 08/08/20  0356 08/07/20  2349 08/07/20 2016 08/07/20  0622   08/04/20  0721 08/03/20  0716   AST 7  --   --  8  --  10 11   ALT 10  --   --  11  --  10 10   ALKPHOS 100  --   --  120  --  111 102   BILITOTAL 1.0  --   --  1.1  --  1.4* 1.3   ALBUMIN 2.2*  --   --  2.6*  --  3.1* 2.7*   INR 1.67* 1.81* 1.82* 1.64*   < > 1.55* 1.48*    < > = values in this interval not displayed.     Coagulation Profile  Recent Labs   Lab 08/08/20  0356 08/07/20  2349 08/07/20 2016 08/07/20  0622   INR 1.67* 1.81* 1.82* 1.64*       IMAGING:  Recent Results (from the past 24 hour(s))   IR Follow Up Visit Inpatient    Narrative    PRE-PROCEDURE DIAGNOSIS: Ascites    POST-PROCEDURE DIAGNOSIS: Same    PROCEDURE: Limited abdominal ultrasound    Impression    IMPRESSION: Ultrasound-guided paracentesis requested for ascites.  Limited ultrasound scan of abdomen revealed trace ascites.  Paracentesis not completed due to increased risk for complication.      ----------    PERFORMED BY: GINA Hobbs PA-C   XR Abdomen Port 1 View    Narrative    Exam: XR ABDOMEN PORT 1 VW, 8/7/2020 2:21 PM    Indication: gi bleed, ab pain    Comparison: Chest radiograph 6/20/2020. Ultrasound 7/29/2020.    Findings:   Two supine views of the lower pelvis and one supine view of the  abdomen. Distended and gas-filled stomach, small bowel, and large  bowel. Multiple calcified and tortuous arteries throughout the abdomen  and upper thighs. The lung bases are visualized.      Impression    Impression:   Gas-filled distention of stomach, small bowel, and large bowel.  Favoring ileus. If there is clinical concern for obstruction follow-up  radiographs can be obtained.    I have personally reviewed the examination and initial interpretation  and I agree with the findings.    LISA FREDERICK MD   XR Chest Port 1 View    Narrative    EXAMINATION: XR CHEST PORT 1 VW, 8/7/2020 6:38 PM    INDICATION: confirm CVC placement    COMPARISON: 8/6/2020    FINDINGS: Single portable AP radiograph of the chest. Right IJ  central  venous catheter with tip terminating over the low SVC. Trachea is  midline. The cardiomediastinal silhouette is stable. Small, right  greater than left pleural effusions. No pneumothorax. Bilateral  interstitial and airspace opacities. Partially visualized upper  abdomen is unremarkable. Benign-appearing sclerotic change of the  fifth posterior right rib apparent sclerotic change of posterior right  rib  2. destructive changes of the shoulders bilaterally.      Impression    IMPRESSION:  1. Right IJ central venous catheter with tip terminating over the low  SVC.  2. Mild pulmonary edema. Bibasilar atelectasis.  3. Small right pleural effusion.    I have personally reviewed the examination and initial interpretation  and I agree with the findings.    JOHN HERNANDEZ MD

## 2020-08-08 NOTE — PROGRESS NOTES
[facility]    Hepatology Follow-up    CC: Acute kidney injury    Dx: Septic shock secondary to e.coli UTI   Acute kidney injury   Variceal bleeding   Decompensated alcoholic cirrhosis    C.diff infection   Post-banding ulcer     24 hour events: MICU transfer, EGD    Subjective:  Doing fine today  Some melena in rectal tube  No nausea, vomiting or abdominal pain  Patient denies fevers, sweats or chills.    Medications  Current Facility-Administered Medications   Medication Dose Route Frequency     gabapentin  300 mg Oral At Bedtime     insulin aspart  1-7 Units Subcutaneous TID AC     insulin aspart  1-5 Units Subcutaneous At Bedtime     insulin glargine  14 Units Subcutaneous At Bedtime     ipratropium  2 spray Nasal Daily     [Held by provider] lactulose  20 g Oral BID     [Held by provider] levothyroxine  75 mcg Oral QAM AC     levothyroxine  37.5 mcg Intravenous Daily     lidocaine  1 patch Transdermal Q24H     lidocaine   Transdermal Q8H     linezolid  600 mg Intravenous Q12H     methylPREDNISolone  4 mg Intravenous Q24H     miconazole   Topical BID     [Held by provider] oxybutynin ER  15 mg Oral Daily     pantoprazole (PROTONIX) IV  40 mg Intravenous BID     [Held by provider] pramipexole  0.125 mg Oral At Bedtime     [Held by provider] predniSONE  5 mg Oral Daily     [Held by provider] rifaximin  550 mg Oral BID     [Held by provider] sevelamer carbonate  1,600 mg Oral TID w/meals     [Held by provider] sodium bicarbonate  1,300 mg Oral BID     sodium chloride (PF)  3 mL Intracatheter Q8H     sodium chloride (PF)  3 mL Intracatheter Q8H     sodium chloride (PF)  3 mL Intracatheter Q8H     [Held by provider] tamsulosin  0.4 mg Oral Daily     vancomycin  125 mg Oral 4x Daily     [Held by provider] vitamin D3  50 mcg Oral Daily       Review of systems  A 10-point review of systems was negative.    Examination  /61   Pulse 79   Temp 97.4  F (36.3  C) (Axillary)   Resp 14   Ht  1.524 m (5')   Wt 86.9 kg (191 lb 9.3 oz)   SpO2 98%   BMI 37.42 kg/m      Intake/Output Summary (Last 24 hours) at 8/8/2020 1304  Last data filed at 8/8/2020 1200  Gross per 24 hour   Intake 4826.5 ml   Output 875 ml   Net 3951.5 ml       Gen- well, NAD, A+Ox3, normal color  CVS- RRR  RS- CTA  Abd- obese, distended, SNT, BS+  Extr- mild FRANCESCA  Neuro- no asterixis  Skin- no rash  Psych- normal mood  Lym- no LAD    Laboratory  Lab Results   Component Value Date     08/08/2020    POTASSIUM 4.3 08/08/2020    CHLORIDE 111 08/08/2020    CO2 18 08/08/2020    BUN 52 08/08/2020    CR 2.50 08/08/2020       Lab Results   Component Value Date    BILITOTAL 1.0 08/08/2020    ALT 10 08/08/2020    AST 7 08/08/2020    ALKPHOS 100 08/08/2020       Lab Results   Component Value Date    WBC 5.6 08/08/2020    HGB 7.7 08/08/2020    MCV 99 08/08/2020    PLT 62 08/08/2020       Lab Results   Component Value Date    INR 1.67 08/08/2020         Radiology  Nil recent    Assessment  70 year old with multiple medical problems in hospital with MARCELO related to e.coli septic shock with hospital stay complicated with variceal bleed, c.diff colitis and GI bleed secondary to post-banding ulcers.  Hemodynamically stable.  Hemoglobin stable.  Would continue PPI and supportive care.    Recommendations  1.  Advance diet as tolerated  2.  Switch pantoprazole to 40mg PO BID  3.  Resume rifaximin 550mg PO BID  4.  Continue vancomycin    Please page GI fellow on call with questions    Blu Christian MD  Hepatology staff

## 2020-08-08 NOTE — CONSULTS
[facility]  Palliative Care Consultation Note    Patient: Analy Martin  Date of Admission:  7/28/2020    Requesting Clinician / Team: Massiel Valentine  Reason for consult: Symptom management  Goals of care    Recommendations:    Patient reports good symptom control at present       Analy has had a very good start to discussions of goals of care and advance care planning with her family. Analy designates her daughter-in-law Iraida as her healthcare surrogate decision-maker and notes that she has known Iraida almost 20 years now and she feels that Iraida is a good advocate for her and will make healthcare decisions that are right for Analy. Analy notes that Iraida includes Analy's two sons in her healthcare decisions, but ultimately Iraida is the person making the decision and this is Analy's wish.      Analy would not want long-term mechanical ventilation.      Analy would accept dialysis if it were recommended, and would accept dialysis longterm (including for the rest of her life) if it gave her more time. We talked at length about what outpatient dialysis would include, and also discussed that with conditions like liver disease, sometimes patient's can't do dialysis safely due to complications like low blood pressure.      We talked about code status at length today. Analy acknowledges that her code status is DNR and DNI (she is able to verbalize what this means) and notes that sometimes she thinks about changing this, but she also acknowledged today that she is not sure how helpful CPR and mechanical ventilation would be for her and she is in agreement with her current code status staying as it is and she will continue talking with Iraida about this. We talked about a POLST form and I recommended she complete this when she feels ready to do so to reflect her wishes around resuscitation and intubation/mechanical ventilation.      Analy's care goals at this time  are restorative and life-prolonging, she is in agreement with aggressively treating potentially reversible conditions such as GI bleeding with procedures and being transferred to MICU for more intensive monitoring/management.      Being independent and spending time with her family are very important to Analy. She loves to cook and play with her 1 y/o granddaughter. She is agreeable to discharge to a rehab facility and is motivated to work to get stronger, and she would like to return to living at home again with help. She is okay with and accepting of increased assistance at home. She would accept living in a care facility long-term so long as she could maintain some independence, especially to be able to continue to use her motorized wheelchair as getting around with this motorized wheelchair is particularly important to her, and interact in a meaningful way with her family.       Analy gave me permission to talk with her daughter-in-law, Iraida by phone and I was able to reach her this afternoon. Iraida is asking for continued support with decision-making with she and Analy and her sons with regard to Analy's healthcare and so I will request follow up in Palliative Care clinic in about a month to continue advanced care planning and goals of care discussions. Iraida knows that if Analy is admitted to hospital again after discharge or if clinical course changing this hospitalization, she and her family can always request Palliative Care team involvement inpatient to help support them.      I asked Aanly to ask her family to bring a copy of her Advance Directive designating her daughter-in-law Iraida as her healthcare decision-maker so that we can get this scanned into Epic. The summary page in [employer] for [employer] and [employer] does note that Iraida is her healthcare decision-maker and Analy is able to confirm this with me today (Analy has decisional capacity at time of my interview/exam).    These  recommendations have been discussed with primary team via note.      Thank you for the opportunity to participate in the care of this patient and family. Our team: does not plan on following further, however do not hesitate to call or re-consult if we can be of further assistance to the patient/family.     During regular M-F work hours -- if you are not sure who specifically to contact -- please contact us by sending a text page to our team consult pager at [phone].    After regular work hours and on weekends/holidays, you can call our answering service at [phone]. Also, who's on call for us is available in [facility] [employer].     Total time spent was 75 minutes,  >50% of time was spent counseling and/or coordination of care regarding goals of care, advance care planning, assessment for symptom management, support with decision-making.    Mervat Baumann MD / Palliative Medicine / Pager [phone] / [facility] Inpatient Team Consult Pager [phone] (answered 8am-430pm M-F) - ok to text page via [employer] / After-Hours Answering Service [phone] / Palliative Clinic in the [facility] at the [facility] - [phone] (scheduling); [phone] (triage).        Assessments:  Analy Martin is a 70 year old female with PMH that includes EtOH liver cirrhosis (MELD-Na 21) c/b portal hypertensive gastropathy/ascities/HE/EV, bone marrow aplasia, CKD stage 4, CIDP and wheelchair-bound, Pt now with EV now s/p banding x5 on OSH on 7/27/20, transferred to [facility] to [facility] 7/29 and then transfer to MICU 8/7 with hypotension and GI bleed with repeat EGD 8/7/20. This admission, Pt also found to have C diff colitis and concern for ileus on 8/7. Palliative care consulted to introduce palliative care and bridge to anticipated outpatient follow up, assess for assistance with symptom management.    Today, the patient was seen for:  --Goals of care  --Advance care planning  --Symptom management  --Alcoholic liver  "cirrhosis with numerous complications including portal hypertension with gastropathy, hepatic encephalopathy, esophageal varices   --Repeat GI bleed this admission    Prognosis, Goals, & Planning:      Functional Status just prior to hospitalization:ECOG 2-3; Patient gets around with help of a motorized wheelchair, is able to coordinate and use [employer] and go to grocery store on her own and out to lunch (pre-pandemic) with friends.      Prognosis, Goals, and/or Advance Care Planning were addressed today: Yes        Summary/Comments: see above      Patient's decision making preferences: shared with support from loved ones          Patient has decision-making capacity today for complex decisions: Yes            I have concerns about the patient/family's health literacy today: No           Patient has completed Healthcare Directive: Has note of invalid healthcare directive in Epic, note of chart that Iraida Martin (daughter-in-law) is healthcare surrogate decision-maker and reportedly has paperwork reflecting this but we do not have access to this paperwork currently. Iraida is noted as patient's healthcare agent in [employer] and [employer] summary sheets [facility] and note of Healthcare Directive in [employer] system (we can't see).      Code status: DNR/DNI    Coping, Meaning, & Spirituality:   Mood, coping, and/or meaning in the context of serious illness were addressed today: Yes  Summary/Comments: Pt acknowledges that this hospital stay has had some \"scary\" days, for instance yesterday when she had recurrence of GI bleed. She remains hopeful that she can get better clinically and discharge to a rehab facility and eventually return home. She asks today if it is \"silly\" to \"have hope\" when she has the underlying medical conditions she has, we discussed that it is always okay to have hope and reframed this in our discussion today as not hope for absence of disease but hope for good quality of life and return " "to things that are important to her.    Social:     Living situation: PTA was living in a senior apartment complex (not longterm)    Key family / caregivers: two adult sons, daughter-in-law    Occupational history: retired LPN    Substance use history: not endorsed    Financial concerns: not reported    Current in-home services: PTA had assistance with bathing, cleaning home. She manages her own medications and does her own cooking.    History of Present Illness:  History gathered today from: patient, medical chart    Analy Martin is a 70 year old female with PMH that includes EtOH liver cirrhosis (MELD-Na 21) c/b portal hypertensive gastropathy/ascities/HE/EV, bone marrow aplasia, CKD stage 4, CIDP and wheelchair-bound, Pt now with EV now s/p banding x5 on OSH on 7/27/20, admitted and transferred to MICU 8/7 with hypotension and GI bleed with repeat EGD 8/7/20, found to have C diff colitis and concern for ileus on 8/7. Palliative care consulted to introduce palliative care and bridge to anticipated outpatient follow up, assess for assistance with symptom management.    Analy was quick to engage in conversation today. She notes that yesterday was a \"scary\" day and that today she is feeling much better and abdominal pain which has been bothersome previously is nearly gone now. Her abdomen feels \"gassy\" but otherwise comfortable. She is happy with the decrease in LE edema as she has ongoing difficulty with LE edema and utilizes lymphedema wraps to help with this. She is sipping ginger ale as we are talking. Please see above under \"recommendations\" for a summary of our discussion today.     Key Palliative Symptom Data:  # Pain severity the last 12 hours: none  # Dyspnea severity the last 12 hours: none  # Nausea severity the last 12 hours: none  # Anxiety severity the last 12 hours: low      ROS:  Comprehensive ROS is reviewed and is negative except as here & per HPI.     Past Medical History:  --EtOH liver cirrhosis " (MELD-Na 21) c/b portal hypertensive gastropathy/ascities/HE/EV --Bone marrow aplasia  --CKD stage 4  --CIDP and utilizes motorized wheelchair  --HFpEF      Past Surgical History:  Past Surgical History:   Procedure Laterality Date     IR FOLLOW UP VISIT INPATIENT  8/7/2020     IR PARACENTESIS  7/31/2020     IR PARACENTESIS  8/3/2020         Family History:  Father with cancer (unspecified type) and diabetes (unspecified type) as well as stroke. Mother with cancer (unspecified type).      Allergies:  No Known Allergies     Medications:  I have reviewed this patient's medication profile and medications from this hospitalization.   Noted scheduled meds are:  Gabapentin 300mg PO at bedtime  Linezolid   methylpred 4mg IV daily  Vancomycin PO    Noted PRN meds are:  PRN fentanyl and versed (given during 8/7 EGD)  Hydroxyzine 10mg PO TID PRN  Oxycodone 5mg PO Q6hrs PRN (used once 8/7)    PTA medications: of note, appears patient may have been taking tramadol 50mg PO Q6hrs PTA    Physical Exam:  Vital Signs: Temp: 97.7  F (36.5  C) Temp src: Axillary BP: 109/60 Pulse: 79 Heart Rate: 82 Resp: 14 SpO2: 100 % O2 Device: Nasal cannula Oxygen Delivery: 2 LPM  Weight: 191 lbs 9.28 oz   Gen: NAD, lying comfortably in bed, smiling and talkative, pleasant and cooperative with interview and exam  HEENT: normocephalic, no scleral icterus, no perioral lesions, oral mucosa moist  CV: RRR at time of exam  Pulm: good air movement bilaterally without wheezing  Abd: soft, +bs, nontender to palpation diffusely, mildly distended  LE: less than 1+ pitting edema bilaterally  Skin: no rash or jaundice on limited exam  Neuro: AOx3, no tremor  Psych: mood-affect congruent      Data reviewed:  Recent imaging reviewed, my comments on pertinents:     8/7/20 AXR  Impression (per Radiology):   Gas-filled distention of stomach, small bowel, and large bowel.  Favoring ileus. If there is clinical concern for obstruction follow-up  radiographs can be  obtained.    8/7/20 CXR  IMPRESSION (per Radiology):  1. Right IJ central venous catheter with tip terminating over the low  SVC.  2. Mild pulmonary edema. Bibasilar atelectasis.  3. Small right pleural effusion.    8/7/20 EGD   Impression (per GI):      - Non-bleeding esophageal ulcers.                        - Small (< 5 mm) esophageal varices.                        - Portal hypertensive gastropathy.                        - Normal examined duodenum.                        - No specimens collected.   Recommendation (per GI):        - Observe patient in ICU for ongoing care.                        - Clear liquid diet today.                        - Use Protonix (pantoprazole) 40 mg PO BID for 3 months.                        - stop octreotide      Recent lab data reviewed, my comments on pertinents:   Today, creatinine 2.5, Hgb 7.4; 8/7/20 C diff PCR positive

## 2020-08-08 NOTE — PLAN OF CARE
ICU End of Shift Summary. See flowsheets for vital signs and detailed assessment.    Changes this shift: A&O; anxious & tearful at one point-prn atarax given w/ relief. MAP >60 w/o intervention. Loose maroon stool-MD notified; rectal pouch placed, trending Hgb & will transfuse if <7. 1 unit plts given, transfuse if < 50. Mg & Phosph replaced.     Plan: Monitor for bleeding. Continue POC & notify team w/ changes or concerns.

## 2020-08-09 NOTE — PLAN OF CARE
ICU End of Shift Summary. See flowsheets for vital signs and detailed assessment.    Changes this shift: Mirapex given for restless leg syndrome. Rectal pouch remains present w/ lg amt liquid brown/maroon stool. Hgb unchanged at 7.5 this AM. C/o cramping & gas-prn simethicone given w/ relief. Incontinent of urine X3 overnight.     Plan: Monitor for bleeding. Continue POC & notify team w/ changes or concerns.

## 2020-08-09 NOTE — PROGRESS NOTES
MEDICAL ICU Progress and Transfer Note  08/09/2020    Date of Hospital Admission: 7/28/2020  Date of Service (when I saw the patient): 08/09/2020    ASSESSMENT: Analy Martin is a 70 year old woman PMH alcoholic cirrhosis c/b PHG, ascites, encephalopathy, esophageal varices s/p 5 bands at OSH on 7/27, CKD stage IV, wheelchair-bound 2/2 CIDP who was transferred to the ICU on 8/7 for GI bleed and hypotension.     No source of bleeding identified on EGD. Hb stabilized after 1u pRBCs and 1u platelets. Suspect bleeding was triggered by C.diff. May warrant outpatient upper/lower endoscopy for surveillance. Seen by palliative care for ongoing GOC as she is not a transplant candidate. Stable for floor transfer.    CHANGES and MAJOR THINGS TODAY:   - Hb stable; check at 1800 then likely ok for daily monitoring  - stool output improving on PO vanco  - advancing diet  - transfer to floor; leaving RIJ for today, can remove 8/10 if stable    PLAN:    Neuro:  # Hepatic encephalopathy  Stroke code activated 8/2 for unequal pupils, lethargy, resolved with resumption of lactulose 8/3 and has been mentally clear since.   - Delirium precautions   - start BID Miralax once stool output from C.diff slows (no lactulose given ileus)  - continue rifaxamin as below    # CIDP on chronic steroids  # Restless legs syndrome  - PTA 5 mg prednisone  -  PTA gabapentin and pramipexole     Pulmonary:  No active issues.    Cardiovascular:  # Transient hypotension in setting of GI bleed, improving  - MAP goal >60    #Chronic HFpEF  - Continue holding PTA torsemide given hypotension     GI/Nutrition:  # Acute on chronic decompensated alcoholic cirrhosis c/b refractory ascites, HE, EV  # Alcohol use disorder in remission (since 2010)  # Melena/hematochezia, hemoptysis   EVs banded at OSH on 7/27. Increasing need for therapeutic paracenteses over past several months. Last para here on 8/3, attempted again 8/7 but no ascites noted. Hemoptysis and large  volume melena on 8/7, without source on EGD (but with blood and clots in stomach; post-banding ulcer). Not a transplant candidate due to multiple comorbidities.  - advancing diet  - PO pantoprazole 40 mg BID  - PTA rifaxamin  - avoiding lactulose given ileus; monitor mental status closely as C.diff improves and begin BID Miralax if worsening of HE.  - holding PTA torsemide and spironolactone  - palliative care following, appreciate assistance with GOC    MELD-Na score: 19 at 8/9/2020  3:16 AM  MELD score: 19 at 8/9/2020  3:16 AM  Calculated from:  Serum Creatinine: 2.53 mg/dL at 8/9/2020  3:16 AM  Serum Sodium: 138 mmol/L (Rounded to 137 mmol/L) at 8/9/2020  3:16 AM  Total Bilirubin: 1.0 mg/dL at 8/8/2020  3:56 AM  INR(ratio): 1.37 at 8/9/2020  3:16 AM  Age: 70 years 4 months    # Concern for ileus, 2/2 C.diff vs lactulose, improving  - monitor abdominal exam    # Nutrition: ADAT to 2g sodium restriction    Renal/Fluids/Electrolytes:  # Non-oliguric MARCELO on CKD stage IV, resolved  # Metabolic acidosis 2/2 CKD  Baseline Cr 2-3 at OSH, 5.44 on admission 7/28. Renal US negative for mass or hydronephrosis. At this point, she would want dialysis if that would become necessary and were offered. See palliative care notes for more details.  - incontinent of urine so unable to track I/Os  - PTA sodium bicarb 1300 mg BID  - PTA sevelamer    # Urinary retention, improved  [place] removed 8/1, has had some urinary retention since then requiring intermittent straight cath for >400 mL on bladder scan.   - Hold tamsulosin, has been incontinent  - Bladder Scan Q shift     # Hypophosphatemia   1.8 on 8/7, repleted  - Monitor phos daily     Endocrine:  # IDDM  PTA glargine 14u QHS. Well-controlled, A1c 5.3% on 7/15.   - Continue medium dose sliding scale insulin   - Continue home dose glargine 14u at bedtime   - Hypoglycemia protocol      # Hypothyroidism   PTA on levothyroxine. TSH 16.58, free T4 0.78 @ OSH.   - PTA 75 mcg  levothyroxine    # Bone mineral disorder  PTA on sevelamer carbonate for BMD  - Sevelamer held while NPO    ID:  # C. difficile colitis with c/f ileus, improving  Sample sent due to abdominal pain and leukocytosis, large volume melena 8/7 afternoon. Had been on lactulose.   - Cont PO vancomycin 125 mg QID  - low threshold for repeat AXR if worsening abdomina pain    # VRE UTI - Continue linezolid 600 mg BID until 8/12 for total of 5 days     Hematology:    # Leukocytosis, resolved  13 on 8/7, suspect 2/2 C. Diff as above.      # Acute on chronic normocytic anemia   Secondary to GIB.   - PM check today, resume daily checks if stable  - Transfuse for hgb<7    # Thrombocytopenia of chronic liver disease   Baseline 60-70s. S/p 1 unit during GIB  - Transfuse <50 with bleed    Musculoskeletal:  # Osteoarthritis  OA longstanding, primarily in shoulders and right knee. Patient does not want APAP. Having increased pain after therapies.   - Pain management: topical lidocaine/capsaicin  - PT/OT    # Gout - resume allopurinol prior to discharge    Skin:  # Intertrigo  Rash diffusely under arms, breasts, pannus, and groin per nursing.  - Micatin powder PRN      General Cares/Prophylaxis:    DVT Prophylaxis: Pneumatic Compression Devices  GI Prophylaxis: PPI  Family Communication: daily  Code status: DNR/DNI    Lines/tubes/drains:  - RIJ (consider leaving in until 8/10), 2 PIVs    Disposition:  -  transfer to floor today    Patient seen and findings/plan discussed with medical ICU staff, Dr. Yomi Pace      -----------------------------------------------------------------------  PHYSICAL EXAMINATION:  Temp:  [97.4  F (36.3  C)-98  F (36.7  C)] 97.7  F (36.5  C)  Pulse:  [66-85] 66  Heart Rate:  [67-82] 67  Resp:  [14-16] 16  BP: ()/(42-85) 82/49  SpO2:  [97 %-100 %] 100 %     General: awake, alert, conversational  HEENT: dry MM, bilateral submandibular swelling present   Pulm/Resp: Clear breath sounds  anteriorly breathing non-labored  CV: Regular, normal rate, 3/6 systolic murmur  Abdomen: soft and distended without fluid wave, less tender  Extremities: venous stasis change and mild contractures bilateral LE, trace LE edema  Skin: diffuse ecchymoses    LABS: personally reviewed in Epic.

## 2020-08-09 NOTE — PROGRESS NOTES
[facility], [place]      Acceptance Note - [place] 2 Service     Analy Martin was transferred to the MICU 8/7 for hypotension and large episode of hematochezia. She underwent emergent EGD with blood/clots in stomach but no evidence of variceal, gastric or duodenal source of bleeding.  She was managed conservatively. A R internal jugular CVC was placed for decreasing MAPs however her BP improved with 1L LR bolus and 2u pRBCs. She was hemodynamically stable and her condition improved without evidence of recurrent GI bleeding so she was transferred to [facility] [place]9 with plan for continued medical management.        Date of Admission:  7/28/2020    Assessment & Plan   Caryl Martin is a 70 y.o. F with past medical history of HFpEF, CVA, aortic stenosis and mitral stenosis, HTN, alcoholic cirrhosis (c/b portal hypertensive gastropathy, ascites, encephalopathy, esophageal varices, and bone marrow aplasia), DM type II, CKD stage IV with chronic anemia, morbid obesity, hypothyroidism, wheelchair-bound d/t CIDP, and depression who is transferred from the MICU 8/9 after hemodynamic stabilization without evidence of recurrent GI bleeding.     She was initially admitted to [facility] MICU on 07/28/2020 after [place] [facility] [place] [facility] [place] possible CRRT in setting of acute renal failure. At OSH, hospitalization was complicated by acute on chronic decompensated cirrhosis with bleeding esophageal varices (now s/p banding x5 on 7/27), septic shock, and progressive renal failure, likely provoked by ESBL UTI.  Patient completed 72hrs of octreotide gtt post-op via RIJ (removed 07/30), prophylactic ceftriaxone (7-day course finished 08/03) has was continued on lactulose, rifaximin. Patient was transferred to general floor on 7/29. She had slowly improving renal function with downtrending creatinine, she did not require CRRT/HD. She underwent paracentesis with 3L removed by IR and albumin replacement on  07/31. Has had some continued oozing through GI tract but no cuba bleeding. Patient became increasingly encephalopathic as of 08/02 evening (did not receive daytime lactulose). Stroke code was called on 08/02 evening for increased lethargy and unequal pupils per nursing; Neuro felt that suspicion for stroke low, no further workup pursued. Repeat 2L paracentesis on 08/03 negative for SBP and rectal lactulose initiated. Symptomatic VRE UTI noted 08/04 so linezolid started. Patient with increased chest discomfort on 08/06, coughed up ~100mL blood on early morning of 08/07, CXR unremarkable.  Repeat para attempted 08/07 but no ascites noted, dilated loops of bowel seen. Patient then had episode of melena followed by large hematochezia episode. She became hypotensive to 80/50s. GI was paged who proceeded with urgent EGD, pRBcs given and she was transferred to the MICU for further management. Her C.diff returned positive and she was started on oral vancomycin, KUB suggestive of ileus. Palliative Care evaluated patient during ICU stay and patient continues to opt for aggressive medical management with goal of life prolonging measures.     Updates:   - q24h hemoglobin check  - Oral vancomycin QID for c.diff  - Continue linezolid for VRE UTI  - Lactulose held for ileus, continue rifaximin for HE ppx  - Monitor UOP, PVRs, straight cath >400mL    #Hepatic encephalopathy  #Acute on chronic decompensated EtOH cirrhosis with ascites  #Alcohol use disorder in remission  Abdominal US on 07/29 without concerning findings; showing portal HTN, splenomegaly, and diffuse gallbladder wall thickening (possible adenomyomatosis or cholesterolosis). EGD with x5 banded esophageal varices 7/27, esophageal ulcers on 8/7 EGD Multiple (>6 volodymyr) within the past month, none concerning for SBP. Two episodes of hepatic encephalopathy provoked initially d/t ESBL UTI and secondly by VRE UTI + C.diff infection + GI bleed. Patient is continued on  rifaximin and is not currently encepahlopathic but lactulose/miralax held in the setting of c.diff and ileus. Not a transplant candidate.   >Ascites:hold home torsemide and spironolactone d/t BP, PRN volodymyr for comfort (always w/ diagnostic studies except cytology +/- w/ albumin replacement). No recent SBP. Not on Ppx.   >HE: Continue rifaxamin, hold lactulose, hold Miralax 17g BID but can restart if HE worsening. Delirium precautions  >Diet: Na restriction to 2000 mg daily, 1.5 mg/kg/day protein   >Varices:  PO pantoprazole 40 mg BID  - PT/OT  - Palliative Care consulted, appreciate assistance with GOC    MELD-Na score: 19 at 8/9/2020  3:16 AM  MELD score: 19 at 8/9/2020  3:16 AM  Calculated from:  Serum Creatinine: 2.53 mg/dL at 8/9/2020  3:16 AM  Serum Sodium: 138 mmol/L (Rounded to 137 mmol/L) at 8/9/2020  3:16 AM  Total Bilirubin: 1.0 mg/dL at 8/8/2020  3:56 AM  INR(ratio): 1.37 at 8/9/2020  3:16 AM  Age: 70 years 4 months     # C.Diff  # VRE UTI  # urinary retention  Leukocytosis resolving, diarrhea improving, pt afebrile, BP improving.  UCx grew  50-100k vanco-resistant Enterococcus, sensitive to linezolid.   - vancomycin PO QID (8/7-present)  - linezolid PO bid (8/7-present)  - restart oxybutynin and tamsulosin  8/10    #Acute on chronic normocytic anemia   #Esophageal varices s/p banding x5  #Hypovolemic shock, resolved  #Thrombocytopenia of chronic liver disease   At OSH, initially thought septic shock 2/2 UTI. Hgb dropped 8.4 --> 7.3 --> 6.4 at so received 3-4u pRBCs while there and another en route to [facility]. Hypovolemic shock precipitated by BRBPR and bleeding esophageal varices, which were banded x5 on 07/27. Required intermittent BP support with Levophed at OSH (weaned off 07/29 morning). Was hemodynamically stable from arrival until 8/7 when she developed melena + hematochezia, received 2 units pRBCs in the ICU. Transferred back to floor.   - GI Hepatology consulted   - Stop octreotide, ceftriaxone   -  Continue PO pantoprazole BID   - Monitor stool output   - Monitor Hgb BID, transfuse if <7  - Zofran, Maalox, and Tums PRN  - Incentive spirometry     #Oliguric MARCELO on CKD stage IV  Baseline Cr 2-3. At OSH, Cr 5.44 with minimal UOP on 07/28 and renal US negative for mass or hydronephrosis. MARCELO likely multifactorial (prerenal and intrinsic renal) from ATN (shock). Potassium WNL. UOP has been minimal. Per Renal, likely not HRS so albumin challenge not needed. Serum creatinine downtrending, albeit slowly. Wellington removed 08/01, some urinary retention since then.   - Nephrology consult appreciated, signed off  - Avoid nephrotoxins  - Continue PTA sodium bicarb 1300 mg BID  - Continue PTA sevelamer 1600 mg TID with meals  - Monitor UOP, PVR/bladder scan qshift, straight cath >400mL    #HFpEF  PTA on torsemide. Echo at OSH from 07/20 showed EF >75%, sclerotic mitral valve with trace MR. Elevated EF c/w high-output heart failure in setting of cirrhosis with ascites. Was continued on torsemide and started on spironolactone at OSH, but both were later held 2/2 hypotension requiring pressor support (weaned 07/28 morning).  - Hold PTA torsemide      #IDDM  PTA on insulin glargine 14u QHS. Well-controlled, A1c 5.3% on 07/15. At OSH, was on medium SSI and 7u glargine QHS.   - Continue medium dose sliding scale insulin   - Continue home dose glargine 14u at bedtime   - Hypoglycemia protocol      #Hypothyroidism   PTA on levothyroxine. TSH 16.58, free T4 0.78 @ OSH.   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 At OSH, received hydrocortisone 50mg Q6H for stress dosing in setting of hypotension. Weaned stress dosed hydrocortisone as patient has been normotensive. No RLS symptoms reported since admission.  - PTA 5 mg prednisone daily  - PTA gabapentin and pramipexole    #Bone mineral disorder  #Osteoarthritis  PTA on sevelamer carbonate for BMD. OA  longstanding, primarily in shoulders and right knee. Patient does not want APAP. Regarding BMD, Ca at goal. Having increased pain after therapies. P now low at 1.8.  - PTA sevelamer   - Replace phos  - Pain management: topical lidocaine/capsaicin and low-dose oxycodone  - PT/OT    #Intertrigo  Rash diffusely under arms, breasts, pannus, and groin per nursing.  - Micatin powder PRN     #Gout  - Hold PTA allopurinol      Diet: 2 g Na restriction, high protein diet  Fluids: None  Lines: 2PIVs, RIJ ( consider removing 8/10)  DVT Prophylaxis: Pneumatic Compression Devices  Wellington Catheter: not present  Code Status: DNR/DNI       Disposition Plan   Expected discharge: 2-4 days, recommended to TCU once TCU placement established.  Entered: Cahva Azevedo MD 08/09/2020, 12:55 PM     The patient's care was discussed with the Primary team.    Chava Azevedo MD  [place] [id number] Service  Internal Medicine, PGY-1  [facility], [place]  Pager: [phone]  ______________________________________________________________________    Interval History   NNR, no acute events overnight. Patient had intermittent hypotension with sleep overnight. Improved to 100s/40s. Tolerating regular diet and PO intake. She feels much better, not confused. Diarrhea improving, abdominal pain and distension still present but improving.      She has pain on her neck that has developed in the past 24 hours. New purulent vesicles that ruptured to palpation with improvement in pain.     Data reviewed today: I reviewed all medications, new labs and imaging results over the last 24 hours. I personally reviewed new labs.    Physical Exam   Vital Signs: Temp: 97.6  F (36.4  C) Temp src: Axillary BP: 104/40 Pulse: 64 Heart Rate: 62 Resp: 14 SpO2: 100 % O2 Device: None (Room air)    Weight: 191 lbs 9.28 oz  General: Alert, interactive, sitting in chair  HEENT: RIJ in place, does have vesicular with purulent material on sternum which ruptured when  palpated,   CV: Regular rate and rhythm, blowing 3/6 holosystolic murmur, no clicks or rubs  Resp: Non-labored breathing  Abd: soft, distended, tympanic, +BS, minimal diffuse tenderness to palpation. Large midline hernia and subumbilical healed surgical scar  Skin: Dry flaking skin over feet. Chronic venous stasis changes to BLEs  Neuro: Alert and oriented to self, place, and time. Follows commands  Psych: Appropriate affect     Data    Recent Labs   Lab 08/09/20  0316 08/08/20  1937 08/08/20  1130 08/08/20  0356 08/07/20  2349 08/07/20 2016 08/07/20  0622   WBC 5.3 5.7 5.6 5.4 6.1 6.5   < > 13.0*   HGB 7.5* 7.5* 7.7* 7.4* 8.1* 8.1*   < > 8.7*   MCV 99 99 99 98 99 99   < > 102*   PLT 54* 66* 62* 57* 49* 54*   < > 75*   INR 1.37*  --   --  1.67* 1.81* 1.82*  --  1.64*     --   --  139  --  143  --  143   POTASSIUM 4.3  --   --  4.3  --  4.6  --  4.2   CHLORIDE 113*  --   --  111*  --  116*  --  118*   CO2 17*  --   --  18*  --  18*  --  17*   BUN 53*  --   --  52*  --  52*  --  46*   CR 2.53*  --   --  2.50*  --  2.54*  --  2.44*   ANIONGAP 8  --   --  10  --  8  --  8   LUIS ALFREDO 7.6*  --   --  7.6*  --  7.8*  --  8.5   *  --   --  200*  --  163*  --  136*   ALBUMIN  --   --   --  2.2*  --   --   --  2.6*   PROTTOTAL  --   --   --  5.0*  --   --   --  5.7*   BILITOTAL  --   --   --  1.0  --   --   --  1.1   ALKPHOS  --   --   --  100  --   --   --  120   ALT  --   --   --  10  --   --   --  11   AST  --   --   --  7  --   --   --  8    < > = values in this interval not displayed.

## 2020-08-09 NOTE — PROGRESS NOTES
MICU Attending Note:  August 9, 2020    I saw and examined the patient with the resident and fellow during daily multidisciplinary round. I reviewed the labs, imaging studies and cultures.  Case discussed with family members at bed side.  Please see MICU resident's note from (August 9, 2020) which we formulated together and represent our mutual findings, assessment and plan.   Key/major active problems.as follows;  The patient is critically ill due to;  -ETOH cirrhosis on miralax  -GIB requiring blood transfusion, s/p banding x 5 on 7/27, repeat EGD did not show active bleed few days ago, stable H&H, on IV PPI, followed by GI  -C diff colitis, on PO Vanco  -MARCELO, CKD, monitor renal functions  -CIDP, resume steroids    Critical care time is 35 min excluding procedure time.    H. Nallely Celaya MD  [phone]

## 2020-08-10 NOTE — INTERIM SUMMARY
Assessment & Plan   Caryl Martin is a 70 y.o. F with past medical history of HFpEF, CVA, aortic stenosis and mitral stenosis, HTN, alcoholic cirrhosis (c/b portal hypertensive gastropathy, ascites, encephalopathy, esophageal varices, and bone marrow aplasia), DM type II, CKD stage IV with chronic anemia, morbid obesity, hypothyroidism, wheelchair-bound d/t CIDP, and depression who is transferred from the MICU 8/9 after hemodynamic stabilization without evidence of recurrent GI bleeding.     She was initially admitted to [facility] MICU on 07/28/2020 after transfer from [facility] [place] for possible CRRT in setting of acute renal failure. At OSH, hospitalization was complicated by acute on chronic decompensated cirrhosis with bleeding esophageal varices (now s/p banding x5 on 7/27), septic shock, and progressive renal failure, likely provoked by ESBL UTI.  Patient completed 72hrs of octreotide gtt post-op via RIJ (removed 07/30), prophylactic ceftriaxone (7-day course finished 08/03) has was continued on lactulose, rifaximin. Patient was transferred to general floor on 7/29. She had slowly improving renal function with downtrending creatinine, she did not require CRRT/HD. She underwent paracentesis with 3L removed by IR and albumin replacement on 07/31. Had some continued oozing through GI tract but no cuba bleeding. Patient became increasingly encephalopathic as of 08/02 evening (did not receive daytime lactulose). Stroke code was called on 08/02 evening for increased lethargy and unequal pupils per nursing; Neuro felt that suspicion for stroke low, no further workup pursued. Repeat 2L paracentesis on 08/03 negative for SBP and rectal lactulose initiated. Symptomatic VRE UTI noted 08/04 so linezolid started. Patient with increased chest discomfort on 08/06, coughed up ~100mL blood on early morning of 08/07, CXR unremarkable.  Repeat para attempted 08/07 but no ascites noted, dilated loops of bowel seen. Patient  then had episode of melena followed by large hematochezia episode. She became hypotensive to 80/50s. GI was paged who proceeded with urgent EGD, pRBcs given and she was transferred to the MICU for further management. She underwent emergent EGD with blood/clots in stomach but no evidence of variceal, gastric or duodenal source of bleeding.  She was managed conservatively. A R internal jugular CVC was placed for decreasing MAPs however her BP improved with 1L LR bolus and 2u pRBCs. She was hemodynamically stable and her condition improved without evidence of recurrent GI bleeding so she was transferred to [facility] [place]9 with plan for continued medical management. Her C.diff returned positive and she was started on oral vancomycin, KUB suggestive of ileus, made NPO for bowel rest. Palliative Care evaluated patient during ICU stay and patient continues to opt for aggressive medical management with goal of life prolonging measures.     # Hepatic encephalopathy  # Acute on chronic decompensated EtOH cirrhosis with ascites  # Alcohol use disorder in remission  #Hepatic encephalopathy  #Delirium  #Ascites  #Acute on chronic decompensated EtOH cirrhosis  #Alcohol use disorder in remission  Patient was admitted to OSH on 07/15 with confusion and difficulty speaking; brain MR showed mild chronic microvascular ischemic changes within cerebral white matter and small chronic cerebellar infarcts with no acute findings. Ultimately confusion was thought to be 2/2 ESBL UTI for which she completed course of ertapenem and Flagyl. At OSH, ammonia was slightly elevated to 40 and mental status improved with lactulose. On admission, ammonia WNL and lactulose discontinued. Patient was initially able to recall details of her hospitalization with occasional difficulty but overall appears mentally very clear and able to converse easily. S/p large volume paracentesis (5L) on 07/19 and 07/24; per family, has had ~6 volodymyr over past few months. Abdominal  US on 07/29 without concerning findings; showing portal HTN, splenomegaly, and diffuse gallbladder wall thickening (possible adenomyomatosis or cholesterolosis). Completed courses of ertapenem and Flagyl on 07/23. S/p repeat para (3L) by IR with 50g albumin replacement on 07/31; fluid sent for diagnostics and negative for SBP. Stroke code activated at 1730 on 08/02 for unequal pupils, lethargy, and patient slumped over in chair per nursing; Neuro felt that suspicion low for stroke so no further workup pursued. Patient became increasingly encephalopathic 08/03 with ammonia 194, likely exacerbated by oozing from banded EV sites and missed lactulose doses on 08/02. Repeat 2L para on 08/03 negative for SBP, albumin replacement given. Finished 7-day course of ceftriaxone on 08/03.  Was found to have c.diff and VRE UTI. Patient is continued on rifaximin and is not currently encepahlopathic but lactulose held in the setting of c.diff and ileus. Not a transplant candidate.   >Ascites:hold home torsemide d/t BP, PRN volodymyr for comfort (always w/ diagnostic studies except cytology +/- w/ albumin replacement). No recent SBP. Not on Ppx.   >HE: Continue rifaxamin, hold lactulose, restart Miralax 17g BID but can restart if HE worsening. Delirium precautions  >Diet: Na restriction to 2000 mg daily, 1.5 mg/kg/day protein   >Varices:  IV pantoprazole 40 mg BID  - PT/OT  - Palliative Care consulted, appreciate assistance with [facility]    MELD-Na score: 19 at 8/10/2020  3:03 AM  MELD score: 19 at 8/10/2020  3:03 AM  Calculated from:  Serum Creatinine: 2.58 mg/dL at 8/10/2020  3:03 AM  Serum Sodium: 137 mmol/L at 8/10/2020  3:03 AM  Total Bilirubin: 1.0 mg/dL at 8/8/2020  3:56 AM  INR(ratio): 1.37 at 8/9/2020  3:16 AM  Age: 70 years 4 months     # C.Diff  # ileus  Pt developed ileus likely due to c.diff, was previously tolerating PO intake but had worsening abdominal pain and distension 8/9. AXR revealed gaseous distension of large and  small bowel loops, slightly improved from prior on 8/7. Discussed decreasing oxycodone use as may be contributing to decreased bowel function.   - NPO for bowel rest besides meds/ice chips, mIVF  - lyte replacement  - decrease oxycodone use, will discuss with patient  - vancomycin PO QID (8/7-present)    # VRE UTI  # urinary retention  Leukocytosis resolving, diarrhea improving, pt afebrile, BP improving.  UCx grew  50-100k vanco-resistant Enterococcus, sensitive to linezolid.   - linezolid PO bid (8/7-present)  - restart PTA oxybutynin and tamsulosin     # Acute on chronic normocytic anemia   # Esophageal varices s/p banding x5  # Hypovolemic shock, resolved  # Thrombocytopenia of chronic liver disease   At OSH, initially thought septic shock 2/2 UTI. Hgb dropped 8.4 --> 7.3 --> 6.4 at so received 3-4u pRBCs while there and another en route to [facility]. Hypovolemic shock precipitated by BRBPR and bleeding esophageal varices, which were banded x5 on 07/27. Required intermittent BP support with Levophed at OSH (weaned off 07/29 morning). Was hemodynamically stable since arrival until 8/7 when she developed melena + hematochezia, received 2 units pRBCs in the ICU. Transferred back to floor.   - GI Hepatology consulted   - Continue PO pantoprazole BID   - Monitor stool output   - Monitor Hgb BID, transfuse if <7  - Zofran, Maalox, and Tums PRN  - Incentive spirometry     #Oliguric MARCELO on CKD stage IV  Baseline Cr 2-3. At OSH, Cr 5.44 with minimal UOP on 07/28 and renal US negative for mass or hydronephrosis. MARCELO likely multifactorial (prerenal and intrinsic renal) from ATN (shock). Potassium WNL. UOP has been minimal. Per Renal, likely not HRS so albumin challenge not needed. Serum creatinine downtrending, albeit slowly. Wellington removed 08/01, some urinary retention since then.   - Nephrology consult appreciated, signed off  - Avoid nephrotoxins  - Continue PTA sodium bicarb 1300 mg BID  - Continue PTA sevelamer 1600 mg TID  with meals  - Monitor UOP, PVR/bladder scan qshift, straight cath >400mL    #HFpEF  PTA on torsemide. Echo at OSH from 07/20 showed EF >75%, sclerotic mitral valve with trace MR. Elevated EF c/w high-output heart failure in setting of cirrhosis with ascites. Was continued on torsemide and started on spironolactone at OSH, but both were later held 2/2 hypotension requiring pressor support (weaned 07/28 morning).  - Hold PTA torsemide      #IDDM  PTA on insulin glargine 14u QHS. Well-controlled, A1c 5.3% on 07/15. At OSH, was on medium SSI and 7u glargine QHS.   - Continue medium dose sliding scale insulin   - Continue home dose glargine 14u at bedtime   - Hypoglycemia protocol      #Hypothyroidism   PTA on levothyroxine. TSH 16.58, free T4 0.78 @ OSH.   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 At OSH, received hydrocortisone 50mg Q6H for stress dosing in setting of hypotension. Weaned stress dosed hydrocortisone as patient has been normotensive. No RLS symptoms reported since admission.  - PTA 5 mg prednisone daily  - PTA gabapentin and pramipexole    #Bone mineral disorder  #Osteoarthritis  PTA on sevelamer carbonate for BMD. OA longstanding, primarily in shoulders and right knee. Patient does not want APAP. Regarding BMD, Ca at goal. Having increased pain after therapies. P now low at 1.8.  - PTA sevelamer   - Replace phos  - Pain management: topical lidocaine/capsaicin and low-dose oxycodone  - PT/OT    #Intertrigo  Rash diffusely under arms, breasts, pannus, and groin per nursing.  - Micatin powder PRN     #Gout  - Hold PTA allopurinol

## 2020-08-10 NOTE — PLAN OF CARE
4C PT -   Discharge Planner PT   Patient plan for discharge: rehab  Current status: patient received compazine at start of PT session and quickly had onset of dizziness which limited activity tolerance. Patient performed sit<>stand with 2 person max A but with BM in stance requiring return to chair followed by dependent lift to bed.   Barriers to return to prior living situation: medical status, impaired functional mobility  Recommendations for discharge: TCU  Rationale for recommendations: Patient will benefit from skilled PT in TCU setting to address functional mobility deficits for eventual safe return home.  Entered by: Erwin Iyer 08/10/2020 2:52 PM

## 2020-08-10 NOTE — PLAN OF CARE
ICU End of Shift Summary. See flowsheets for vital signs and detailed assessment.    Changes this shift: Persistent abdominal pain-team notified, abdominal xray ordered; pt now NPO except ice chips & sips w water. Stool thickening & output decreasing, continues to pass gas. VSS- MAP >60, RA, Afebrile.     Plan: Transfer to 6B when able. Notify team w/ changes or concerns.

## 2020-08-10 NOTE — PROGRESS NOTES
[facility], [place]      Acceptance Note - [place] 2 Service       Date of Admission:  7/28/2020    Assessment & Plan   Caryl Martin is a 70 y.o. F with past medical history of HFpEF, CVA, aortic stenosis and mitral stenosis, HTN, alcoholic cirrhosis (c/b portal hypertensive gastropathy, ascites, encephalopathy, esophageal varices, and bone marrow aplasia), DM type II, CKD stage IV with chronic anemia, morbid obesity, hypothyroidism, wheelchair-bound d/t CIDP, and depression who is transferred from the MICU 8/9 after hemodynamic stabilization without evidence of recurrent GI bleeding.     Updates today:   - q24h hemoglobin check  - NPO besides meds  - Oral vancomycin QID for c.diff  - Continue linezolid for VRE UTI  - Lactulose held for ileus, start Miralax BID, continue rifaximin for HE ppx  - Monitor UOP, PVRs, straight cath >400mL    She was initially admitted to [facility] MICU on 07/28/2020 after transfer from [facility] for possible CRRT in setting of acute renal failure. At OSH, hospitalization was complicated by acute on chronic decompensated cirrhosis with bleeding esophageal varices (now s/p banding x5 on 7/27), septic shock, and progressive renal failure, likely provoked by ESBL UTI.  Patient completed 72hrs of octreotide gtt post-op via RIJ (removed 07/30), prophylactic ceftriaxone (7-day course finished 08/03) has was continued on lactulose, rifaximin. Patient was transferred to general floor on 7/29. She had slowly improving renal function with downtrending creatinine, she did not require CRRT/HD. She underwent paracentesis with 3L removed by IR and albumin replacement on 07/31. Has had some continued oozing through GI tract but no cuba bleeding. Patient became increasingly encephalopathic as of 08/02 evening (did not receive daytime lactulose). Stroke code was called on 08/02 evening for increased lethargy and unequal pupils per nursing; Neuro felt that suspicion  for stroke low, no further workup pursued. Repeat 2L paracentesis on 08/03 negative for SBP and rectal lactulose initiated. Symptomatic VRE UTI noted 08/04 so linezolid started. Patient with increased chest discomfort on 08/06, coughed up ~100mL blood on early morning of 08/07, CXR unremarkable.  Repeat para attempted 08/07 but no ascites noted, dilated loops of bowel seen. Patient then had episode of melena followed by large hematochezia episode. She became hypotensive to 80/50s. GI was paged who proceeded with urgent EGD, pRBcs given and she was transferred to the MICU for further management. She underwent emergent EGD with blood/clots in stomach but no evidence of variceal, gastric or duodenal source of bleeding.  She was managed conservatively. A R internal jugular CVC was placed for decreasing MAPs however her BP improved with 1L LR bolus and 2u pRBCs. She was hemodynamically stable and her condition improved without evidence of recurrent GI bleeding so she was transferred to [facility] 8/9 with plan for continued medical management. Her C.diff returned positive and she was started on oral vancomycin, KUB suggestive of ileus. Palliative Care evaluated patient during ICU stay and patient continues to opt for aggressive medical management with goal of life prolonging measures.         # Hepatic encephalopathy  # Acute on chronic decompensated EtOH cirrhosis with ascites  # Alcohol use disorder in remission  Abdominal US on 07/29 without concerning findings; showing portal HTN, splenomegaly, and diffuse gallbladder wall thickening (possible adenomyomatosis or cholesterolosis). EGD with x5 banded esophageal varices 7/27, esophageal ulcers on 8/7 EGD Multiple (>6 volodymyr) within the past month, none concerning for SBP. Two episodes of hepatic encephalopathy provoked initially d/t ESBL UTI and secondly by VRE UTI + C.diff infection + GI bleed. Patient is continued on rifaximin and is not currently encepahlopathic but  lactulose held in the setting of c.diff and ileus. Not a transplant candidate.   >Ascites:hold home torsemide d/t BP, PRN volodymyr for comfort (always w/ diagnostic studies except cytology +/- w/ albumin replacement). No recent SBP. Not on Ppx.   >HE: Continue rifaxamin, hold lactulose, restart Miralax 17g BID but can restart if HE worsening. Delirium precautions  >Diet: Na restriction to 2000 mg daily, 1.5 mg/kg/day protein   >Varices:  IV pantoprazole 40 mg BID  - PT/OT  - Palliative Care consulted, appreciate assistance with GOC    MELD-Na score: 19 at 8/10/2020  3:03 AM  MELD score: 19 at 8/10/2020  3:03 AM  Calculated from:  Serum Creatinine: 2.58 mg/dL at 8/10/2020  3:03 AM  Serum Sodium: 137 mmol/L at 8/10/2020  3:03 AM  Total Bilirubin: 1.0 mg/dL at 8/8/2020  3:56 AM  INR(ratio): 1.37 at 8/9/2020  3:16 AM  Age: 70 years 4 months     # C.Diff  # ileus  Pt developed ileus likely due to c.diff, was previously tolerating PO intake but had worsening abdominal pain and distension 8/9. AXR revealed gaseous distension of large and small bowel loops, slightly improved from prior on 8/7. Discussed decreasing oxycodone use as may be contributing to decreased bowel function.   - NPO for bowel rest besides meds/ice chips, mIVF  - lyte replacement  - decrease oxycodone use, will discuss with patient  - vancomycin PO QID (8/7-present)    # VRE UTI  # urinary retention  Leukocytosis resolving, diarrhea improving, pt afebrile, BP improving.  UCx grew  50-100k vanco-resistant Enterococcus, sensitive to linezolid.   - linezolid PO bid (8/7-present)  - restart PTA oxybutynin and tamsulosin     # Acute on chronic normocytic anemia   # Esophageal varices s/p banding x5  # Hypovolemic shock, resolved  # Thrombocytopenia of chronic liver disease   At OSH, initially thought septic shock 2/2 UTI. Hgb dropped 8.4 --> 7.3 --> 6.4 at so received 3-4u pRBCs while there and another en route to [facility]. Hypovolemic shock precipitated by BRBPR  and bleeding esophageal varices, which were banded x5 on 07/27. Required intermittent BP support with Levophed at OSH (weaned off 07/29 morning). Was hemodynamically stable from arrival until 8/7 when she developed melena + hematochezia, received 2 units pRBCs in the ICU. Transferred back to floor.   - GI Hepatology consulted   - Continue PO pantoprazole BID   - Monitor stool output   - Monitor Hgb BID, transfuse if <7  - Zofran, Maalox, and Tums PRN  - Incentive spirometry     #Oliguric MARCELO on CKD stage IV  Baseline Cr 2-3. At OSH, Cr 5.44 with minimal UOP on 07/28 and renal US negative for mass or hydronephrosis. MARCELO likely multifactorial (prerenal and intrinsic renal) from ATN (shock). Potassium WNL. UOP has been minimal. Per Renal, likely not HRS so albumin challenge not needed. Serum creatinine downtrending, albeit slowly. Wellington removed 08/01, some urinary retention since then.   - Nephrology consult appreciated, signed off  - Avoid nephrotoxins  - Continue PTA sodium bicarb 1300 mg BID  - Continue PTA sevelamer 1600 mg TID with meals  - Monitor UOP, PVR/bladder scan qshift, straight cath >400mL    #HFpEF  PTA on torsemide. Echo at OSH from 07/20 showed EF >75%, sclerotic mitral valve with trace MR. Elevated EF c/w high-output heart failure in setting of cirrhosis with ascites. Was continued on torsemide and started on spironolactone at OSH, but both were later held 2/2 hypotension requiring pressor support (weaned 07/28 morning).  - Hold PTA torsemide      #IDDM  PTA on insulin glargine 14u QHS. Well-controlled, A1c 5.3% on 07/15. At OSH, was on medium SSI and 7u glargine QHS.   - Continue medium dose sliding scale insulin   - Continue home dose glargine 14u at bedtime   - Hypoglycemia protocol      #Hypothyroidism   PTA on levothyroxine. TSH 16.58, free T4 0.78 @ OSH.   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 At OSH,  received hydrocortisone 50mg Q6H for stress dosing in setting of hypotension. Weaned stress dosed hydrocortisone as patient has been normotensive. No RLS symptoms reported since admission.  - PTA 5 mg prednisone daily  - PTA gabapentin and pramipexole    #Bone mineral disorder  #Osteoarthritis  PTA on sevelamer carbonate for BMD. OA longstanding, primarily in shoulders and right knee. Patient does not want APAP. Regarding BMD, Ca at goal. Having increased pain after therapies. P now low at 1.8.  - PTA sevelamer   - Replace phos  - Pain management: topical lidocaine/capsaicin and low-dose oxycodone  - PT/OT    #Intertrigo  Rash diffusely under arms, breasts, pannus, and groin per nursing.  - Micatin powder PRN     #Gout  - Hold PTA allopurinol      Diet: 2 g Na restriction, high protein diet  Fluids: None  Lines: 2PIVs, RIJ (will remove once she demonstrates consistent hemodynamic stability without further bleeding)  DVT Prophylaxis: Pneumatic Compression Devices  Wellington Catheter: not present  Code Status: DNR/DNI       Disposition Plan   Expected discharge: 3-4 days, recommended to TCU once TCU placement established.  Entered: Chava Azevedo MD 08/10/2020, 6:54 AM     The patient's care was discussed with the Primary team.    Chava Azevedo MD  [place] [id number] Service  Internal Medicine, PGY-1  [facility], [place]  Pager: [phone]  ______________________________________________________________________    Interval History   NNR, no acute events overnight. Patient had increasing abdominal pain/distension overnight. AXR showed mild improvement of continued distended large and small bowel loops. She used 10 mg oxycodone overnight, discussed that this may be contributing to her decreased bowel function and that I don't think it is helping decrease her belly pain. We discussed alternative options for pain including heat, massage, movement, sitting in chair.     She continues to have abdominal pain and  nausea, 2 BMs overnight. Coughing up small amounts of bright red blood. Feeling disenchanted with how her hospital course has been up down up down. She is interested in talking with health psychologist.     Data reviewed today: I reviewed all medications, new labs and imaging results over the last 24 hours. I personally reviewed new labs.    Physical Exam   Vital Signs: Temp: 97.1  F (36.2  C) Temp src: Axillary BP: 107/45 Pulse: 75 Heart Rate: 71 Resp: 14 SpO2: 98 % O2 Device: None (Room air)    Weight: 191 lbs 9.28 oz  General: Alert, answering questions appropriately, laying in bed  HEENT: RIJ in place, does have vesicular with purulent material on sternum and desquamation of 0knb2sd plaque  CV: Regular rate and rhythm, blowing 3/6 holosystolic murmur, no clicks or rubs  Resp: Non-labored breathing, CTAB  Abd: soft, distended, tympanic, hypoactive BS, mild diffuse tenderness to palpation. Large midline hernia and subumbilical healed surgical scar  Skin: Dry flaking skin over feet. Chronic venous stasis changes to BLEs  Neuro: Alert and oriented to self, place, and time. Follows commands. No asterixis  Psych: Appropriate affect, sad    Data    Recent Labs   Lab 08/10/20  0303 08/09/20  1735 08/09/20  0316 08/08/20  1937 08/08/20  1130 08/08/20  0356 08/07/20  2349  08/07/20  0622   WBC  --   --  5.3 5.7 5.6 5.4 6.1   < > 13.0*   HGB 8.6* 7.8* 7.5* 7.5* 7.7* 7.4* 8.1*   < > 8.7*   MCV  --   --  99 99 99 98 99   < > 102*   PLT 57*  --  54* 66* 62* 57* 49*   < > 75*   INR  --   --  1.37*  --   --  1.67* 1.81*   < > 1.64*     --  138  --   --  139  --    < > 143   POTASSIUM 4.6  --  4.3  --   --  4.3  --    < > 4.2   CHLORIDE 112*  --  113*  --   --  111*  --    < > 118*   CO2 17*  --  17*  --   --  18*  --    < > 17*   BUN 55*  --  53*  --   --  52*  --    < > 46*   CR 2.58*  --  2.53*  --   --  2.50*  --    < > 2.44*   ANIONGAP 7  --  8  --   --  10  --    < > 8   LUIS ALFREDO 7.9*  --  7.6*  --   --  7.6*  --    < >  8.5   *  --  156*  --   --  200*  --    < > 136*   ALBUMIN  --   --   --   --   --  2.2*  --   --  2.6*   PROTTOTAL  --   --   --   --   --  5.0*  --   --  5.7*   BILITOTAL  --   --   --   --   --  1.0  --   --  1.1   ALKPHOS  --   --   --   --   --  100  --   --  120   ALT  --   --   --   --   --  10  --   --  11   AST  --   --   --   --   --  7  --   --  8    < > = values in this interval not displayed.

## 2020-08-10 NOTE — PLAN OF CARE
ICU End of Shift Summary. See flowsheets for vital signs and detailed assessment.    Changes this shift: Abdominal pain/discomfort continues. Utilizing PRNs, see MAR. Heat/repositioning for pain management. Afebrile, VSS. Room air. Remains NPO. Increased amount of watery stool output, rectal tube placed and 200mL out. One episode of hypoglycemia, D10 drip started.     Plan: determine goals of care, notify team of any changes.

## 2020-08-10 NOTE — PLAN OF CARE
ICU End of Shift Summary. See flowsheets for vital signs and detailed assessment.    Changes this shift:  Pt with increased abd pain this afternoon, PRN oxycodone, simethecone, atarax, repositioning, and heat does not help.  Pt passing gas and belching, but only 100ml brown stool output.  Taking pills and liquids fine.  Not much of an appetite. No n/v. Incont urine X 4.  Up to chair with sling for 2hours. BP soft, MAPs in 60s.     Plan:  [place] 2 transfer orders for 6B.  Abd xray for increased pain.  Continue to monitor stool output.

## 2020-08-10 NOTE — PROGRESS NOTES
[facility], [place]    Hepatology Follow-up    CC: Acute Kidney Injury    Dx: Decompensated alcoholic cirrhosis   Septic shock 2/2 UTI   Acute kidney injury   Variceal bleeding   Post-banding ulcer   C. Diff infection    24 hour events: No acute events. AXR done overnight, see below    Subjective:  - Abdominal pain improved after BM this AM, no melena or hematochezia  - Denies emesis, mild nausea this AM  - Denies fevers, chills or night sweats.     Patient denies fevers, sweats or chills.    Medications  Current Facility-Administered Medications   Medication Dose Route Frequency     gabapentin  300 mg Oral At Bedtime     insulin aspart  1-7 Units Subcutaneous TID AC     insulin aspart  1-5 Units Subcutaneous At Bedtime     insulin glargine  14 Units Subcutaneous At Bedtime     ipratropium  2 spray Nasal Daily     [Held by provider] lactulose  20 g Oral BID     levothyroxine  75 mcg Oral QAM AC     lidocaine  1 patch Transdermal Q24H     lidocaine   Transdermal Q8H     linezolid  600 mg Intravenous Q12H     miconazole   Topical BID     oxybutynin ER  15 mg Oral Daily     pantoprazole (PROTONIX) IV  40 mg Intravenous BID     polyethylene glycol  17 g Oral or Feeding Tube BID     pramipexole  0.125 mg Oral At Bedtime     predniSONE  5 mg Oral Daily     rifaximin  550 mg Oral BID     sevelamer carbonate  1,600 mg Oral TID w/meals     sodium bicarbonate  1,300 mg Oral BID     sodium chloride (PF)  3 mL Intracatheter Q8H     sodium chloride (PF)  3 mL Intracatheter Q8H     sodium chloride (PF)  3 mL Intracatheter Q8H     tamsulosin  0.4 mg Oral Daily     vancomycin  125 mg Oral 4x Daily     vitamin D3  50 mcg Oral Daily       Review of systems  A 10-point review of systems was negative.    Examination  /45   Pulse 85   Temp 97.7  F (36.5  C) (Axillary)   Resp 18   Ht 1.524 m (5')   Wt 86.9 kg (191 lb 9.3 oz)   SpO2 99%   BMI 37.42 kg/m      Intake/Output Summary (Last 24 hours) at  8/10/2020 1042  Last data filed at 8/10/2020 1000  Gross per 24 hour   Intake 1115 ml   Output 420 ml   Net 695 ml       Gen- lying in bed, NAD, appears well  CVS- RRR  RS- CTA  Abd- obese, soft, NT/ND  Extr-mild lower extremity edema bilaterally  Neuro-A&Ox3  Skin- no rashes  Psych- normal mood    Laboratory  Lab Results   Component Value Date     08/10/2020    POTASSIUM 4.6 08/10/2020    CHLORIDE 112 08/10/2020    CO2 17 08/10/2020    BUN 55 08/10/2020    CR 2.58 08/10/2020       Lab Results   Component Value Date    BILITOTAL 1.0 08/08/2020    ALT 10 08/08/2020    AST 7 08/08/2020    ALKPHOS 100 08/08/2020       Lab Results   Component Value Date    WBC 5.3 08/09/2020    HGB 8.6 08/10/2020    MCV 99 08/09/2020    PLT 57 08/10/2020       Lab Results   Component Value Date    INR 1.37 08/09/2020     Radiology  Abdominal X-ray 8/9/20:  1. Slightly decreased gaseous distention of large and small bowel but  bowel loops which likely represent ileus.  2. Small right pleural effusion.    Assessment  70 year old with PMHx of decompensated alcohol related cirrhosis c/b HE, ascites and EV bleeding who presented with MARCELO due to septic shock in the setting of E. Coli UTI. His hospital stay has been complicated by a variceal bleed s/p banding (7/27/20), C. Diff colitis and UGIB 2/2 post banding ulcers.     Hemoglobin stable without any melena or hematochezia. MELD-Na: 19.     Of note, she is not a liver transplant candidate given her age, multiple comorbidities, inability to ambulate and deconditioning.      Recommendations  - Continue pantoprazole 40mg PO BID x 12 weeks post EGD and then stop  - Continue rifaximin 550mg BID   - Continue PO vancomycin for treatment of C. Diff colitis x 10 days (started 8/7/2020)  - Given concern for ileus, recommend decreasing/stopping opioids and getting up to chair for all meals.     Discussed with hepatology attending, Dr. Christian.    Tisha Munoz MD  GI  [facility]  [phone]

## 2020-08-10 NOTE — PROGRESS NOTES
Caryl is a 70-year-old female with past medical history significant for cirrhosis, detailed as below, who was admitted with a GI bleed, now with multisystem organ failure and worsening cognitive function.    Plan:  -We have had comfort care discussions daily on 8/14, 8/15 and today, 8/16.  We have held the course steady without escalation of cares and allowing time for improvement in clinical condition with conservative management per patient request.  -Today, she has worsening ileus with a small bowel of 6 cm.  While she is making urine in response to Lasix, she is still oliguric, acidotic.  We are unable to provide bicarb infusion given lack of proper IV access.  Patient was hopeful that given her improvement in urine output, she would recover enough to get better and go home.  We did discuss, that despite improvement in urine output, she continues to have ileus which prevents us from providing nutrition.  -We outlined that we could continue to support her inpatient however, this would mean ongoing blood draws, attempted IVs, minimizing medications like oxycodone and Dilaudid that would provide comfort,given they promote ileus and confusion. we worry she would pass away in the hospital despite continuing current management and away from her home and family.  We readdressed her goals, patient stated she would like to see her granddaughter.  She was very tearful, stating she could not believe this was the end.  Emotional support provided by our team, family, son Aquiles, daughter-in-law Iraida, were on the phone with does as well.  Patient and family consenting to transition to hospice.  - paged who will reach out to family to make arrangements for hospice in facility.    PMHx:  1. HFpEF- last ECHO July 2020 w EF>70% and moderate aortic stenosis and trivial regurgitation, Mitral stenosis with severely increased mean gradient of 10.0 mmHg and Severely enlarged left atrium--Per records has not been seen by  cardiology as outpatient. Does have admissions w volume overload- however, these are complex multi-factorial given her hx of cirrhosis/CKD and HF not isolated the sole reason  2.  CVA- This appears to be a chart error- No clear documentation of time/site or deficits- only started in her charts since Feb 2020  3. HTN- Not on any medications at this time  5. Cirrhosis- likely 2/2 alcohol +drug-toxicity from AZA (given her for her CDP)- NO hepatolgoy visits seen as an outpatient (c/b portal hypertensive gastropathy, ascites, encephalopathy, esophageal varices, and bone marrow aplasia)  6. DM type II- Insulin dependent, last A1c of 6.4 in July 2020. Last endocrine visit> 2 years ago  7. CKD stage IV with chronic anemia & Secondary hyperparathyroidism- on Phos binder, calcitriol and Arenesp. Last seen by renal as OP in 2019- unclear etiology.  8. morbid obesity  9.  Hypothyroidism  10. 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 No longer followed by Neurology.   Last visit w details in 05/4/2018- at that time her EMG was simialr to 1993 and there was a thought og trying IVIG for 5 doses  11. Primary open angle glaucoma (POAG) of both eyes, mild stage 1/29/2019  +   Central retinal vein occlusion with macular edema of left eye 8/3/2018 - last visit in 4/2020  12. Restless leg syndrome  13. MDD   15. Colovesicular fistula - possibly repaired in 2010 per chart review    Recent hospital admissions:  Lived at home till Dec 2019  Jan 2020- Admission for cellulitis  Feb 2020- admission for volume overload  May 2020- GIB w Portal HTN gastropathy- 2U PRBC  June 2020- Admission w E.coli UTI & Ascites  July 2020- Prolonged hospital course w GIB+ESBL UTI sepsis assc hypotension, ESRD requiring CRRT transferred to [facility] leading to current  hospital stay

## 2020-08-10 NOTE — PLAN OF CARE
OT 4C  Discharge Planner OT   Patient plan for discharge: not discussed-per chart review, pt agreeable to TCU   Current status: pt requiring increased stimuli for arousal as quite lethargic and easily falling back asleep. Improved with dependent transfer to chair using liko lift. Engaged pt in BUE ROM with varying level of assist vs resistance per patient's tolerance. Low BP with MAP 61 upon initial transfer to chair with report of dizziness but improvement to MAP 74 after 2-3 minutes.   Barriers to return to prior living situation: medical status, weakness, fatigue, below baseline with transfers.   Recommendations for discharge: TCU  Rationale for recommendations: Pt will benefit from further therapy to promote greater IND with mobility and ADLs.        Entered by: Lilly Polanco 08/10/2020 1:37 PM     Interdisciplinary Non-Pharmacological Management of Delirium:     General Supportive Measures: Ensure adequate hydration and nutrition. Schedule toileting. Appropriate assessment and treatment of pain.   Re-Sims Patient: Ensure clock has correct time and white board has correct date. Communicate clearly, providing explanations as appropriate. Encourage presence of family members for reassurance. Have family/caregiver bring in familiar objects/pictures.  Cognition: Engage in appropriate meaningful communication and activities with patient (current events discussion, word games, magazines, newspapers).   Sensory: Use eyeglasses, hearing aids, or voice amplifiers as appropriate.   Avoid Use of Physical Restraints as Appropriate: Indicate need and frequently re-assess matthews catheters and other tethers (cap IVs if medically appropriate).   Maintain Mobility and Self Care Ability: Avoid bedrest. Have patient up in chair for meals if appropriate.   Normalize Sleep-Wake Cycle: Discourage too much daytime napping (less than 30 minutes at a time), aim for uninterrupted periods of sleep at night.   Days: Keep room well light  with lights on and shades open.   Night: Keep room quiet with low level lighting.   For Agitation: Avoid overstimulating environment, try music, massage, appropriate TV stations, and relaxation techniques. Take patient for a walk if appropriate, even if in the middle of the night.   Safety Concerns: Patients with delirium are at high risk for falls. Use bed and chair alarms along with frequent surveillance.

## 2020-08-11 NOTE — PLAN OF CARE
Discharge Planner PT   Patient plan for discharge: Not discussed  Current status: Pt dependently transferred to MOVEO, completed 5 sets of 10 reps at 10-20 degrees for LE strengthening and to increase ease of transfers. Transferred to recliner at end of session per RN request, VSS and all needs met.   Barriers to return to prior living situation: Medical status, impaired mobility and strength, pain levels.   Recommendations for discharge: TCU  Rationale for recommendations: Pt currently lacks mobility to safely access and navigate home environment; see above.        Entered by: Demetris Newell 08/11/2020 3:20 PM

## 2020-08-11 NOTE — PLAN OF CARE
Discharge Planner OT   Patient plan for discharge: TCU  Current status: Pt completes unsupported sitting with cross body reaching x ~5'. Pt completes partial stands x 3 with assist for foot and hand placement, pt able to clear bottom off chair with max A x 1 but unable to fully extend hips. VSS on RA.   Barriers to return to prior living situation: Medical status, decreased ADL I, decreased I with transfers.   Recommendations for discharge: TCU  Rationale for recommendations: To address above deficits and facilitate return to functional baseline.        Entered by: Tamera Reed 08/11/2020 9:46 AM     OT 4C

## 2020-08-11 NOTE — PROGRESS NOTES
[facility] [place]      Acceptance Note - [facility] Service       Date of Admission:  7/28/2020    Assessment & Plan   Caryl Martin is a 70 y.o. F with past medical history of HFpEF, CVA, aortic stenosis and mitral stenosis, HTN, alcoholic cirrhosis (c/b portal hypertensive gastropathy, ascites, encephalopathy, esophageal varices, and bone marrow aplasia), DM type II, CKD stage IV with chronic anemia, morbid obesity, hypothyroidism, wheelchair-bound d/t CIDP, and depression who is transferred from the MICU 8/9 after hemodynamic stabilization for GI bleed and hypotension. For full details of admission, please see interim summary dated 8/10/2020.    Updates today:   - CLD today  - q24h hemoglobin check  - Oral vancomycin QID for c.diff  - Continue linezolid for VRE UTI  - Lactulose held for ileus, start Miralax BID, continue rifaximin for HE ppx  - Monitor UOP, PVRs, straight cath >400mL    # Hepatic encephalopathy  # Acute on chronic decompensated EtOH cirrhosis with ascites  # Alcohol use disorder in remission  Multiple (>6 volodymyr) within the past month, none concerning for SBP. Two episodes of hepatic encephalopathy provoked initially d/t ESBL UTI and secondly by VRE UTI + C.diff infection + GI bleed. Patient is continued on rifaximin and is not currently encepahlopathic but lactulose held in the setting of c.diff and ileus. Not a transplant candidate.   >Ascites:hold home torsemide d/t BP, PRN volodymyr for comfort (always w/ diagnostic studies except cytology +/- w/ albumin replacement). No recent SBP. Not on Ppx.   >HE: Continue rifaxamin, hold lactulose, restart Miralax 17g BID. Delirium precautions  >Diet: Na restriction to 2000 mg daily, 1.5 mg/kg/day protein   >Varices:  IV pantoprazole 40 mg BID  - PT/OT  - Palliative Care consulted, appreciate assistance with [facility]    MELD-Na score: 22 at 8/10/2020  3:03 AM  MELD score: 19 at 8/10/2020  3:03 AM  Calculated from:  Serum Creatinine: 2.58  mg/dL at 8/10/2020  3:03 AM  Serum Sodium: 137 mmol/L at 8/10/2020  3:03 AM  Total Bilirubin: 1.0 mg/dL at 8/8/2020  3:56 AM  INR(ratio): 1.67 at 8/8/2020  3:56 AM  Age: 70 years 4 months     # C.Diff  # ileus  Pt developed ileus likely due to c.diff, was previously tolerating PO intake but had worsening abdominal pain and distension 8/9. AXR revealed gaseous distension of large and small bowel loops, slightly improved from prior on 8/7. Discussed decreasing oxycodone use as may be contributing to decreased bowel function.   - CLD  - lyte replacement  - decrease oxycodone use, discussed with patient  - vancomycin PO QID (8/7-present)    # VRE UTI  # urinary retention  Leukocytosis resolving, diarrhea improving, pt afebrile, BP improving.  UCx grew  50-100k vanco-resistant Enterococcus, sensitive to linezolid.   - linezolid PO bid (8/7-present)  - restart PTA oxybutynin and tamsulosin     # Acute on chronic normocytic anemia   # Esophageal varices s/p banding x5  # Hypovolemic shock, resolved  # Thrombocytopenia of chronic liver disease   At OSH, initially thought septic shock 2/2 UTI. Hgb dropped 8.4 --> 7.3 --> 6.4 at so received 3-4u pRBCs while there and another en route to [facility]. Hypovolemic shock precipitated by BRBPR and bleeding esophageal varices, which were banded x5 on 07/27. Required intermittent BP support with Levophed at OSH (weaned off 07/29 morning). Was hemodynamically stable from arrival until 8/7 when she developed melena + hematochezia, received 2 units pRBCs in the ICU, EGD revealed multiple esophogeal ulcers without further intervention. Transferred back to floor.   - GI Hepatology consulted   - Continue PO pantoprazole BID   - Monitor stool output   - Monitor Hgb BID, transfuse if <7  - Zofran, Maalox, and Tums PRN  - Incentive spirometry     #Oliguric MARCELO on CKD stage IV  Baseline Cr 2-3. At OSH, Cr 5.44 with minimal UOP on 07/28 and renal US negative for mass or hydronephrosis. MARCELO likely  multifactorial (prerenal and intrinsic renal) from ATN (shock). Potassium WNL. UOP has been minimal. Per Renal, likely not HRS so albumin challenge not needed. Serum creatinine downtrending, albeit slowly. Wellington removed 08/01, some urinary retention since then. Now worsening after GI bleeding, 2.73 on 8/11, up from 2.3.   - Nephrology consult appreciated, signed off  - Avoid nephrotoxins  - Continue PTA sodium bicarb 1300 mg BID  - Continue PTA sevelamer 1600 mg TID with meals  - Monitor UOP, PVR/bladder scan qshift, straight cath >400mL    #HFpEF  PTA on torsemide. Echo at OSH from 07/20 showed EF >75%, sclerotic mitral valve with trace MR. Elevated EF c/w high-output heart failure in setting of cirrhosis with ascites. Was continued on torsemide and started on spironolactone at OSH, but both were later held 2/2 hypotension requiring pressor support (weaned 07/28 morning).  - Hold PTA torsemide      #IDDM  PTA on insulin glargine 14u QHS. Well-controlled, A1c 5.3% on 07/15. At OSH, was on medium SSI and 7u glargine QHS.   - Continue medium dose sliding scale insulin   - Continue home dose glargine 14u at bedtime   - Hypoglycemia protocol      #Hypothyroidism   PTA on levothyroxine. TSH 16.58, free T4 0.78 @ OSH.   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 At OSH, received hydrocortisone 50mg Q6H for stress dosing in setting of hypotension. Weaned stress dosed hydrocortisone as patient has been normotensive. No RLS symptoms reported since admission.  - PTA 5 mg prednisone daily  - PTA gabapentin and pramipexole    #Bone mineral disorder  #Osteoarthritis  PTA on sevelamer carbonate for BMD. OA longstanding, primarily in shoulders and right knee. Patient does not want APAP. Regarding BMD, Ca at goal. Having increased pain after therapies. P now low at 1.8.  - PTA sevelamer   - Replace phos  - Pain management: topical  lidocaine/capsaicin and low-dose oxycodone  - PT/OT    #Intertrigo  Rash diffusely under arms, breasts, pannus, and groin per nursing.  - Micatin powder PRN     #Gout  - Hold PTA allopurinol      Diet: CLD, advance to 2 g Na restriction, high protein diet  Fluids: None  Lines: 2PIVs, RIJ (will remove once she demonstrates consistent hemodynamic stability without further bleeding)  DVT Prophylaxis: Pneumatic Compression Devices  Wellington Catheter: not present  Code Status: DNR/DNI       Disposition Plan   Expected discharge: 3-4 days, recommended to TCU once TCU placement established.  Entered: Chava Azevedo MD 08/11/2020, 6:56 AM     The patient's care was discussed with the Primary team.    Chava Azevedo MD  [place] [id number] Service  Internal Medicine, PGY-1  [facility], [place]  Pager: [phone]  ______________________________________________________________________    Interval History   NNR, no acute events overnight.       Abdominal pain improved overnight and now without pain. She is hungry and wanting to eat, no nausea/vomiting. Did have small maroon stool. Just removed CVC, laying flat and having back pain.     Data reviewed today: I reviewed all medications, new labs and imaging results over the last 24 hours. I personally reviewed new labs.    Physical Exam   Vital Signs: Temp: 97.5  F (36.4  C) Temp src: Oral BP: 106/49 Pulse: 82 Heart Rate: 83 Resp: 14 SpO2: 97 % O2 Device: None (Room air)    Weight: 191 lbs 9.28 oz  General: Alert, answering questions appropriately, laying in bed  HEENT: RIJ in place, does have vesicular with purulent material on sternum and desquamation of [id number] plaque  CV: Regular rate and rhythm, blowing 3/6 holosystolic murmur, no clicks or rubs  Resp: Non-labored breathing, CTAB  Abd: soft, distended, tympanic, hypoactive BS, mild diffuse tenderness to palpation. Large midline hernia and subumbilical healed surgical scar  Skin: Dry flaking skin over feet. Chronic  venous stasis changes to BLEs  Neuro: Alert and oriented to self, place, and time. Follows commands. No asterixis  Psych: Appropriate affect, sad    Data    Recent Labs   Lab 08/11/20  0350 08/10/20  2306 08/10/20  2212 08/10/20  0303  08/09/20  0316 08/08/20  1937  08/08/20  0356 08/07/20  2349  08/07/20  0622   WBC 6.4  --   --   --   --  5.3 5.7   < > 5.4 6.1   < > 13.0*   HGB 7.3* 7.5* Canceled, Test credited 8.6*   < > 7.5* 7.5*   < > 7.4* 8.1*   < > 8.7*   MCV 98  --   --   --   --  99 99   < > 98 99   < > 102*   PLT 41*  --   --  57*  --  54* 66*   < > 57* 49*   < > 75*   INR  --   --   --   --   --  1.37*  --   --  1.67* 1.81*   < > 1.64*     --   --  137  --  138  --   --  139  --    < > 143   POTASSIUM 4.2  --   --  4.6  --  4.3  --   --  4.3  --    < > 4.2   CHLORIDE 110*  --   --  112*  --  113*  --   --  111*  --    < > 118*   CO2 17*  --   --  17*  --  17*  --   --  18*  --    < > 17*   BUN 62*  --   --  55*  --  53*  --   --  52*  --    < > 46*   CR 2.73*  --   --  2.58*  --  2.53*  --   --  2.50*  --    < > 2.44*   ANIONGAP 7  --   --  7  --  8  --   --  10  --    < > 8   LUIS ALFREDO 7.5*  --   --  7.9*  --  7.6*  --   --  7.6*  --    < > 8.5   *  --   --  133*  --  156*  --   --  200*  --    < > 136*   ALBUMIN  --   --   --   --   --   --   --   --  2.2*  --   --  2.6*   PROTTOTAL  --   --   --   --   --   --   --   --  5.0*  --   --  5.7*   BILITOTAL  --   --   --   --   --   --   --   --  1.0  --   --  1.1   ALKPHOS  --   --   --   --   --   --   --   --  100  --   --  120   ALT  --   --   --   --   --   --   --   --  10  --   --  11   AST  --   --   --   --   --   --   --   --  7  --   --  8    < > = values in this interval not displayed.

## 2020-08-11 NOTE — PROGRESS NOTES
Gastroenterology Inpatient Sign Off Note    Inpatient GI consults service will sign off. No further recommendations at this time. If primary team has addition questions, please page consult fellow listed in [facility].    Current GI Consult Staff: Dr. Christian     Follow up recommendations:   -- Follow up with PCP and Nephrology  -- Patient wants to follow up with GI providers at [facility] Chimaobi Zafar Junior MD  GI fellow

## 2020-08-11 NOTE — PROGRESS NOTES
CLINICAL NUTRITION SERVICES - REASSESSMENT NOTE     Nutrition Prescription    RECOMMENDATIONS FOR MDs/PROVIDERS TO ORDER:  Strongly recommend feeding tube placement for EN to meet needs, as pt consumed on average 223 kcal and 3 g pro over 3 days ( from 8/2-8/4) with little to no improvement over th epast week. Pt with ongoing NPO, CL, FL diet orders and < 25% of estimated needs being met. Pt has currently been NPO x 3 days with no plan for diet advancement. If pt is unable to advance diet in the next 1-2 days, RD to recommend enteral or parenteral nutrition be initiated.        --If EN becomes POC, please place consult for RD to order TF per MNT guidelines.     Malnutrition Status:    Severe malnutrition in the context of acute on chronic illness    Recommendations already ordered by Registered Dietitian (RD):  RD to discontinue supplement order at this time    Future/Additional Recommendations:  If EN becomes POC:  Nutren 1.5 @ 45 mL/hr to provide 1620 kcals (30 kcal/kg/day), 73 g PRO (1.3 g/kg/day), 821 mL H2O, 190 g CHO and no Fiber daily.  --Start TF @ 15 ml/hr and advance by 10 ml q 8 hrs until goal rate.  --Do not start or advance TF unless K+/Mg++ >/= WNL and Phos > 1.9  --Certavite daily, 100 mg thiamine daily  --Minimum 30 ml q 4 hrs water flushes for tube patency, additional as per MD    If TPN becomes POC: CPN, goal volume 1440 ml/day or per pharmD with 210g Dex daily (714kcal), 80g AA daily (320kcal) and 250 ml of 20% IV lipids x5 days weekly = 1391 kcals/day (25 kcal/kg/day), 1.5 g PRO/kg/day, GIR 2.7 with 25% kcals from Fat.  Micro/Rx: infuvite     EVALUATION OF THE PROGRESS TOWARD GOALS   Diet: NPO  Intake: Patient has been NPO x 3 days with no plan at this time for diet advancement. Pts diet orders have been between NPO, full liquids and 2g NA over the past 7 days with very minimal PO intakes. Prior to this past week, pt with ongoing poor PO intakes, now going on 2 weeks of minimal nutrition.       NEW FINDINGS   Weight: 9% wt loss noted over the past 2 weeks. Suspect fluids, however cannot rule out true weight loss. Driest weight this admission of 84.5 kg on 8/3.   Meds: Oral vancomycin QID for c.diff, Lactulose held for ileus, start Miralax BID, continue rifaximin for HE ppx  GI: Pt reported reduced abdominal discomfort. Increased amount of watery stool output, rectal tube placed and 200mL out.  -- She underwent paracentesis with 3L removed by IR and albumin replacement on 07/31. Repeat 2L paracentesis on 08/03. Repeat para attempted 08/07 but no ascites noted, dilated loops of bowel seen. She underwent emergent EGD with blood/clots in stomach but no evidence of variceal, gastric or duodenal source of bleeding. EGD revealed multiple esophogeal ulcers without further intervention.  -- Pt developed ileus likely due to c.diff, was previously tolerating PO intake but had worsening abdominal pain and distension 8/9. AXR revealed gaseous distension of large and small bowel loops, slightly improved from prior on 8/7. Discussed decreasing oxycodone use as may be contributing to decreased bowel function.   Renal: CKD stage IV    MALNUTRITION  % Intake: </= 50% for >/= 5 days (severe)  % Weight Loss: > 2% in 1 week (severe)  Subcutaneous Fat Loss: Unable to assess  Muscle Loss: Unable to assess  Fluid Accumulation/Edema: Mild  Malnutrition Diagnosis: Severe malnutrition in the context of acute on chronic illness    Previous Goals   Patient to consume % of nutritionally adequate meal trays TID, or the equivalent with supplements/snacks.  Evaluation: Not met    Previous Nutrition Diagnosis  Inadequate oral intake related to poor appetite, pain, AMS as evidenced by PO intake documented < 75% x 7 days with 2 day Kcal counts ~230 kcal and 5 g pro, and significant 9% wt loss x 7 days (some of which is confounded by fluid)   Evaluation: Declining    CURRENT NUTRITION DIAGNOSIS  Inadequate oral intake related to NPO  diet order 2.2 to ileus as evidenced by NPO x 3 days, no plan for diet advancement at this time and ongoing 2 weeks of very poor nutrition.      INTERVENTIONS  Implementation  Collaboration with other providers- paged provider about ongoing very poor intakes  Enteral Nutrition - recs    Goals  Diet adv v nutrition support within 1-2 days.    Monitoring/Evaluation  Progress toward goals will be monitored and evaluated per protocol.      Latoya Abebe, RD, MS, LD  SICU: [phone]

## 2020-08-11 NOTE — PLAN OF CARE
ICU End of Shift Summary. See flowsheets for vital signs and detailed assessment.    Changes this shift: Peripheral access established. Discontinued CVC. Purwick in place. Only 75mls of urine out this shift, bladder scanned for 100mls but very difficult bladder scan, straight cath'd for 350mls. External fecal incontinence device in place. Started on clear liquid diet. Tolerating well. Up to chair with lift twice today. Bathed in evening.     Plan: Medsurg orders. Possible need for midline.         Problem: Adult Inpatient Plan of Care  Goal: Patient-Specific Goal (Individualization)  8/11/2020 1741 by Lina Munguia, RN  Outcome: No Change     Problem: Diabetes Comorbidity  Goal: Blood Glucose Level Within Desired Range  8/11/2020 1741 by Lina Munguia RN  Outcome: No Change     Problem: Pain Chronic (Persistent) (Comorbidity Management)  Goal: Acceptable Pain Control and Functional Ability  8/11/2020 1741 by Lina Munguia, RN  Outcome: No Change     Problem: Oral Intake Inadequate (Acute Kidney Injury/Impairment)  Goal: Optimal Nutrition Intake  8/11/2020 1741 by Lina Munguia, RN  Outcome: Improving

## 2020-08-11 NOTE — PLAN OF CARE
ICU End of Shift Summary. See flowsheets for vital signs and detailed assessment.    Changes this shift: Pt reported reduced abdominal discomfort.  Sinus rhythm, MAPs maintained >60.  BGs high on D10, in 180s.  Maroon stool in evening, hgb drop from 8.6 to 7.5, stable this AM at 7.3.  Plts low at 41, okay to watch per MD.  Complaining of dry mouth.  125 mL out to purewick catheter, 2x unmeasured outputs.    Plan:  6B when bed available.

## 2020-08-12 NOTE — PLAN OF CARE
ICU End of Shift Summary. See flowsheets for vital signs and detailed assessment.    Changes this shift: Lethargic but oriented. Abdominal pain/cramping throughout day. Oxycodone dose reduced and given. Simethicone given. Sinus tach. Soft blood pressures throughout day. 2 500cc LR boluses given. 50g total of albumin given. Midodrine added. RA. Spitting up small amounts of bile. Abdominal US and xray completed. NPO except meds and ice chips. Oliguric. Straight catheterized with 50cc UO. UA sent. Up to chair with PT. Wellington placed per nephrology. Lasix challenge complete. IR consulted for possible paracentesis tomorrow.     Plan: Possible dialysis. Possible paracentesis. Watch UO.        Problem: Oral Intake Inadequate (Acute Kidney Injury/Impairment)  Goal: Optimal Nutrition Intake  8/12/2020 1838 by Lina Munguia, RN  Outcome: No Change     Problem: Diabetes Comorbidity  Goal: Blood Glucose Level Within Desired Range  8/12/2020 1838 by Lina Munguia RN  Outcome: Improving     Problem: Adult Inpatient Plan of Care  Goal: Optimal Comfort and Wellbeing  8/12/2020 1838 by Lina Munguia RN  Outcome: Declining     Problem: Renal Insufficiency Comorbidity  Goal: Renal Insufficiency  Description: Patient comorbidity will be monitored for signs and symptoms of Renal Insufficiency (Chronic) condition.  Problems will be absent, minimized or managed by discharge/transition of care.  8/12/2020 1838 by Lina Munguia, RN  Outcome: Declining     Problem: Renal Function Impairment (Acute Kidney Injury/Impairment)  Goal: Effective Renal Function  8/12/2020 1838 by Lina Munguia, RN  Outcome: Declining

## 2020-08-12 NOTE — PROGRESS NOTES
Nephrology Progress Note  08/12/2020         Assessment & Recommendations:   Analy Martin is a 70 year old year old female with PMHx of DM c/b CKD 4, decompensated alcoholic cirrhosis with ascites, hepatic encephalopathy and esophageal varices; admitted to OSH for septic shock 2/2 ESBL E. Coli UTI. Course complicated by acute UGIB 2/2 esophageal varices, and was transferred to [facility] on 7/28 for further management and worsening MARCELO, cr=5.44 (baseline 2-3). Nephrology was initially consulted for increased creatinine on admission (5.44), which was due to septic shock and bleed, went down to mid 2s and we signed off. She was then transferred on 8/7 to ICU for GI bleed and melena.  Nephrology was consulted again for creatinine increasing in past couple days to 3.15 in setting of hypotension in the 70s. She has urinary retention for which she has intermittent catheterization. She has been anuric in the last 24h.    MARCELO on CKD  Patient was hypotensive overnight and creatinine had been increasing in last 5 days, from mid 2s to 3.15 yesterday. Seems to be ATN caused by hypotension and UGIB, and given the lack of response to fluids.  - We recommend increasing midodrine to 10 mg (from 5 mg) TID for hypotension management and better kidney perfusion.  - Lasix challenge 120 mg IV; if urine output >200 mL in next few hours then patient is responsive to Lasix, if not; may need dialysis.  - Paracentesis for SBP which can worsen MARCELO; team already planned it for tomorrow, pulling 2L out  - Wellington catheter for strict ins and outs  - U pr/cr ratio and U Na (already ordered)    Recommendations were communicated to primary team via note and call.  Seen and discussed with Dr. Miranda who agrees with above assessment and plan.    Yessi Agarwal MD  Internal Medicine resident PGY-1  [phone]    Interval History :   Nursing and provider notes from last 24 hours reviewed.  No acute events overnight. Incontinent of urine x3, but no stool  output overnight. Low blood pressures overnight as well. Continues to have abdominal pain epigastric radiating through to back.    Review of Systems:   A comprehensive of systems was negative except as noted above.    Physical Exam:   I/O last 3 completed shifts:  In: 1930 [P.O.:480; I.V.:350; IV Piggyback:1000]  Out: 475 [Urine:475]   BP (!) 87/47   Pulse 101   Temp 97.8  F (36.6  C) (Oral)   Resp 14   Ht 1.524 m (5')   Wt 94.6 kg (208 lb 8.9 oz)   SpO2 94%   BMI 40.73 kg/m       General: Alert, answering questions appropriately, appears uncomfortable  CV: Regular rate and rhythm, no rubs or clicks  Edema: 2-3+ bilateral lower extremity edema  Respiratory: difficult to examine due to patient lying in bed, in pain  Abdominal: soft, distended, tympanic, hypoactive BS, mild tenderness to diffuse palpation.  Neuro: Alert and oriented to self, place, and time. Follows commands. No asterixis  Psych: Appropriate affect     Labs:   All labs reviewed by me  Electrolytes/Renal -   Recent Labs   Lab Test 08/12/20  0346 08/11/20  0350 08/10/20  0303 08/09/20  0316 08/08/20  0356 08/07/20  2016    134 137 138 139 143   POTASSIUM 4.3 4.2 4.6 4.3 4.3 4.6   CHLORIDE 108 110* 112* 113* 111* 116*   CO2 16* 17* 17* 17* 18* 18*   BUN 65* 62* 55* 53* 52* 52*   CR 3.15* 2.73* 2.58* 2.53* 2.50* 2.54*   * 182* 133* 156* 200* 163*   LUIS ALFREDO 8.2* 7.5* 7.9* 7.6* 7.6* 7.8*   MAG  --   --  1.8  --  2.1 1.4*   PHOS 2.6  --  2.6 3.2 3.4  --        CBC -   Recent Labs   Lab Test 08/12/20  0947 08/12/20  0346 08/11/20  0350  08/10/20  0303  08/09/20  0316   WBC  --  7.5 6.4  --   --   --  5.3   HGB 7.5* 7.7* 7.3*   < > 8.6*   < > 7.5*   PLT  --  53* 41*  --  57*  --  54*    < > = values in this interval not displayed.       LFTs -   Recent Labs   Lab Test 08/08/20  0356 08/07/20  0622 08/04/20  0721   ALKPHOS 100 120 111   BILITOTAL 1.0 1.1 1.4*   ALT 10 11 10   AST 7 8 10   PROTTOTAL 5.0* 5.7* 6.3*   ALBUMIN 2.2* 2.6* 3.1*        Iron Panel -   Recent Labs   Lab Test 07/29/20  0420   IRON 51   IRONSAT 47*   TAMARA 352*         Current Medications:    [Held by provider] gabapentin  300 mg Oral At Bedtime     insulin aspart  1-7 Units Subcutaneous TID AC     insulin aspart  1-5 Units Subcutaneous At Bedtime     [Held by provider] insulin glargine  14 Units Subcutaneous At Bedtime     ipratropium  2 spray Nasal Daily     [Held by provider] lactulose  20 g Oral BID     [Held by provider] levothyroxine  75 mcg Oral QAM AC     lidocaine  1 patch Transdermal Q24H     lidocaine   Transdermal Q8H     linezolid  600 mg Intravenous Q12H     miconazole   Topical BID     midodrine  10 mg Oral or Feeding Tube TID w/meals     [Held by provider] oxybutynin ER  15 mg Oral Daily     pantoprazole (PROTONIX) IV  40 mg Intravenous BID     [Held by provider] polyethylene glycol  17 g Oral or Feeding Tube BID     [Held by provider] pramipexole  0.125 mg Oral At Bedtime     predniSONE  5 mg Oral Daily     rifaximin  550 mg Oral BID     [Held by provider] sevelamer carbonate  1,600 mg Oral TID w/meals     [Held by provider] sodium bicarbonate  1,300 mg Oral BID     sodium chloride (PF)  3 mL Intracatheter Q8H     sodium chloride (PF)  3 mL Intracatheter Q8H     sodium chloride (PF)  3 mL Intracatheter Q8H     [Held by provider] tamsulosin  0.4 mg Oral Daily     vancomycin  125 mg Oral 4x Daily     [Held by provider] vitamin D3  50 mcg Oral Daily       - MEDICATION INSTRUCTIONS -       dextrose 10% Stopped (08/11/20 0851)     Yessi Agarwal MD

## 2020-08-12 NOTE — PLAN OF CARE
Discharge Planner OT   Patient plan for discharge: Rehab  Current status: Pt scores 13 on Short Blessed Test where 10+ is considered a severe impairment. Pt requires min A to brush teeth. BP is hypotensive (74/34) which has been the case this morning.   Barriers to return to prior living situation: Medical status, decreased I with transfers and ADLs.   Recommendations for discharge: TCU  Rationale for recommendations: to address above deficits.        Entered by: Tamera Reed 08/12/2020 11:34 AM     OT 4C

## 2020-08-12 NOTE — PROGRESS NOTES
I have personally interviewed and examined the patient on 8/12/20. I agree with the documentation as noted by the Nephrology Resident dated 8/12/20.     Patient is a 70 year old female with past history of alcoholic cirrhosis who was admitted to OSH with septic shock due to ESBL e.coli. She was then transferred to [facility] for further eval. Upon presentation here her creatinine was elevated at 5.4 and slowly improved to 2.5 and has been increasing again over the last several days and now up to 3.2. She was transferred to the ICU on 8/7 for UGI bleed and hypotension. She has also had issues with urinary retention requiring in and out catheterization. Her UA shows pyuria and hematuria and yeast. She has been on midodrine this hospital stay but those has been reduced.     Her MARCELO likely secondary ATN in the setting of her hypotension and upper GIB. This is in the background of her recent MARCELO from septic shock and cirrhosis with hepatorenal physiology. She needs higher perfusion pressure and recommend increasing her midodrine to 10 mg tid. Recommend placing a Wellington for strict ins an outs. The fact that she is now anuric is concerning and recommend giving Lasix 120 mg IV to assess her response. Can give 100 ml of 25% albumin before her Lasix dose. Agree with paracentesis to rule out SBP as this can exacerbated her MARCELO.     We will continue to follow along.

## 2020-08-12 NOTE — PLAN OF CARE
4C PT  Discharge Planner PT   Patient plan for discharge: Not stated  Current status: Pt limited by abdominal pain and hypotension this date though MAPs >60 with activity during session. ModA required for supine rolling. Pt dependently transferred bed>recliner via OH lift, fatigues quickly with seated LE exercises.  Barriers to return to prior living situation: Medical status, mobility status  Recommendations for discharge: TCU  Rationale for recommendations: To maximize strength, endurance, balance, and safety and independence with functional mobility.       Entered by: Sabrina Castano 08/12/2020 4:15 PM

## 2020-08-12 NOTE — CONSULTS
Health Psychology                  Clinic    Department of Medicine  Colleen Yepez, PhD, LP [phone]                          [facility]  [facility] Diandra Torres, PhD, LP [phone]                  3rd Floor  [place] Mail [address]   Anmol Coreas, PhD, ABPP, LP [phone]     [facility], [address],  Siobhan Magaña,  PhD, LP [phone]            [place] [address] Fara Frost, PhD, LP [phone]     Inpatient Health Psychology Consultation    Reviewed chart and called patient on hospital room phone for initial health psychology consultation. Ms. Martin stated she was not feeling well enough to have a conversation and she preferred to speak at another time. Plan is to re-attempt consult later in the week.     Siobhan Magaña, PhD, LP  Clinical Health Psychologist

## 2020-08-12 NOTE — PLAN OF CARE
ICU End of Shift Summary. See flowsheets for vital signs and detailed assessment.    Changes this shift: VSS. Complaints of nausea, abdominal pain and restless legs throughout shift, prns utilized, see MAR. Heating pack applied with some relief. Pt able to sleep most of the night. BS for 121, incontinent of urine x3, but no stool output overnight. Blood sugars stable throughout shift.     Plan:  Transfer to [facility] when bed available.       Problem: Diabetes Comorbidity  Goal: Blood Glucose Level Within Desired Range  8/12/2020 0447 by Tanvir Wall, RN  Outcome: No Change     Problem: Pain Chronic (Persistent) (Comorbidity Management)  Goal: Acceptable Pain Control and Functional Ability  8/12/2020 0447 by Tanvir Wall, RN  Outcome: No Change     Problem: Renal Insufficiency Comorbidity  Goal: Renal Insufficiency  Description: Patient comorbidity will be monitored for signs and symptoms of Renal Insufficiency (Chronic) condition.  Problems will be absent, minimized or managed by discharge/transition of care.  8/12/2020 0447 by Tanvir Wall, RN  Outcome: No Change     Problem: Electrolyte Imbalance (Acute Kidney Injury/Impairment)  Goal: Serum Electrolyte Balance  8/12/2020 0447 by Tanvir Wall, RN  Outcome: No Change     Problem: Oral Intake Inadequate (Acute Kidney Injury/Impairment)  Goal: Optimal Nutrition Intake  8/12/2020 0447 by Tanvir Wall, RN  Outcome: No Change

## 2020-08-12 NOTE — PROGRESS NOTES
[facility], [place]      Acceptance Note - Markrista 2 Service       Date of Admission:  7/28/2020    Assessment & Plan   Caryl Martin is a 70 y.o. F with past medical history of HFpEF, CVA, aortic stenosis and mitral stenosis, HTN, alcoholic cirrhosis (c/b portal hypertensive gastropathy, ascites, encephalopathy, esophageal varices, and bone marrow aplasia), DM type II, CKD stage IV with chronic anemia, morbid obesity, hypothyroidism, wheelchair-bound d/t CIDP, and depression who is transferred from the MICU 8/9 after hemodynamic stabilization for GI bleed and hypotension.     For full details of admission, please see interim summary dated 8/10/2020.    Updates today:   - NPO for now - hold non-essential PO medictaions  - stat Hb and lactate with low blood pressures  - restarted midodrine   - given 500 ml LR and albumin for low blood pressures  - abdominal XR ordered for ileus follow upand abdominal US to assess ascites  - continue rifaximin for HE ppx   - ordered echo  - will reach back out to nephrology today with Cr increase  - may need to consider TPN for nutrition if continues to need to be NPO      # Hypotension unclear etiology  DDx bleeding, low intravascular volume, HRS, cardiac  - NPO for now - hold non-essential PO medictaions  - stat Hb and lactate with low blood pressures  - restarted midodrine   - given 500 ml LR and albumin for low blood pressures  - abdominal XR ordered for ileus follow upand abdominal US to assess ascites  - Echo limited ordered to assess wall motion    # Hepatic encephalopathy  # Acute on chronic decompensated EtOH cirrhosis with ascites  # Alcohol use disorder in remission  Multiple (>6 volodymyr) within the past month, none concerning for SBP. Two episodes of hepatic encephalopathy provoked initially d/t ESBL UTI and secondly by VRE UTI + C.diff infection + GI bleed. Patient is continued on rifaximin and is not currently encepahlopathic but lactulose held in  the setting of c.diff and ileus. Not a transplant candidate.   >Ascites: hold home torsemide d/t BP, PRN volodymyr for comfort (always w/ diagnostic studies except cytology +/- w/ albumin replacement). No history of SBP. Not on Ppx.   >HE: Continue rifaxamin, hold lactulose and Miralax 17g BID. Delirium precautions  >Diet: Na restriction to 2000 mg daily, 1.5 mg/kg/day protein when able to take PO  >Varices:  IV pantoprazole 40 mg BID  - PT/OT  - Palliative Care consulted and signed off    MELD-Na score: 19 at 8/10/2020  3:03 AM  MELD score: 19 at 8/10/2020  3:03 AM  Calculated from:  Serum Creatinine: 2.58 mg/dL at 8/10/2020  3:03 AM  Serum Sodium: 137 mmol/L at 8/10/2020  3:03 AM  Total Bilirubin: 1.0 mg/dL at 8/8/2020  3:56 AM  INR(ratio): 1.37 at 8/9/2020  3:16 AM  Age: 70 years 4 months     # C.Diff  # ileus  - NPO  - lyte replacement  - decrease oxycodone use, discussed with patient  - vancomycin PO QID (8/7-present)  - abdominal XR ordered for ileus follow upand abdominal US to assess ascites  - Lactulose and Miralax BID held for ileus, continue rifaximin for HE ppx     # Severe Malnutrition due to chronic illness  - Meets malnutrition criteria based on decreased intake and edema  - Continue to assess patient and follow clinical nutrition recommendations  - may need to consider TPN for nutrition if continues to need to be NPO    # VRE UTI  # urinary retention   - linezolid PO bid (8/7-8/13)  - restart PTA oxybutynin and tamsulosin     # Acute on chronic normocytic anemia   # Esophageal varices s/p banding x5  # Hypovolemic shock, resolved  # Thrombocytopenia of chronic liver disease   - GI Hepatology consulted and signed off 8/11   - Continue PO pantoprazole BID   - Monitor stool output   - Monitor Hgb BID, transfuse if <7  - Zofran, Maalox, and Tums PRN  - Incentive spirometry     #Oliguric MARCELO on CKD stage IV  - Nephrology consulted and signed off   - Avoid nephrotoxins  - restart calcitriol when able to take  PO  - Continue PTA sodium bicarb 1300 mg BID  - Continue PTA sevelamer 1600 mg TID with meals  - Monitor UOP, PVR/bladder scan qshift, straight cath >400mL    #HFpEF  - Hold PTA torsemide due to MARCELO on CKD     #IDDM  PTA on insulin glargine 14u QHS. Well-controlled, A1c 5.3% on 07/15. At OSH, was on medium SSI and 7u glargine QHS.   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  - Micatin powder PRN     #Gout  - Hold PTA allopurinol will discuss restart with Neph if needed            Diet: NPO  Fluids: None  Lines: 2PIVs  DVT Prophylaxis: Pneumatic Compression Devices  Wellington Catheter: not present  Code Status: DNR/DNI       Disposition Plan   Expected discharge: 3-4 days, recommended to TCU once TCU placement established.  Entered: Siobhan Espinoza MD 08/12/2020, 10:53 AM     The patient's care was discussed with the Attending Physician, Dr. Kim.    Siobhan Espinoza MD  Internal Medicine & Pediatrics PGY4  Pager: [phone]    ______________________________________________________________________    Interval History   NNR, no acute events overnight. Incontinent of urine x3, but no stool output overnight. Low blood pressures overnight as well. Continues to have abdominal pain epigastric radiating through to back. Not hungry this morning. Abdomen feels full to her. Not wanting to take PO medications due to discomfort.     Data reviewed today: I reviewed all medications, new labs and imaging results over the last 24 hours. I personally  reviewed new labs.    Physical Exam   Vital Signs: Temp: 98.6  F (37  C) Temp src: Axillary BP: (!) 77/31 Pulse: 100 Heart Rate: 100 Resp: 18 SpO2: 92 % O2 Device: None (Room air)    Weight: 208 lbs 8.88 oz  General: Alert, answering questions appropriately, laying in bed, appears uncomfortable  HEENT: does have vesicular with purulent material on sternum and desquamation of 0uio0ke plaque  CV: Regular rate and rhythm, blowing 3/6 holosystolic murmur, no clicks or rubs  Resp: Non-labored breathing, CTAB  Abd: soft, distended, tympanic, hypoactive BS, mild tenderness to palpation especially in epigastric region. Large midline hernia and subumbilical healed surgical scar  Skin: Dry flaking skin over feet. Chronic venous stasis changes to BLEs, pitting edema bilateral LE to knee  Neuro: Alert and oriented to self, place, and time. Follows commands. No asterixis  Psych: Appropriate affect, sad    Data    Recent Labs   Lab 08/12/20  0947 08/12/20  0346 08/11/20  0350  08/10/20  0303  08/09/20  0316  08/08/20  0356 08/07/20  2349  08/07/20  0622   WBC  --  7.5 6.4  --   --   --  5.3   < > 5.4 6.1   < > 13.0*   HGB 7.5* 7.7* 7.3*   < > 8.6*   < > 7.5*   < > 7.4* 8.1*   < > 8.7*   MCV  --  98 98  --   --   --  99   < > 98 99   < > 102*   PLT  --  53* 41*  --  57*  --  54*   < > 57* 49*   < > 75*   INR  --   --   --   --   --   --  1.37*  --  1.67* 1.81*   < > 1.64*   NA  --  133 134  --  137  --  138  --  139  --    < > 143   POTASSIUM  --  4.3 4.2  --  4.6  --  4.3  --  4.3  --    < > 4.2   CHLORIDE  --  108 110*  --  112*  --  113*  --  111*  --    < > 118*   CO2  --  16* 17*  --  17*  --  17*  --  18*  --    < > 17*   BUN  --  65* 62*  --  55*  --  53*  --  52*  --    < > 46*   CR  --  3.15* 2.73*  --  2.58*  --  2.53*  --  2.50*  --    < > 2.44*   ANIONGAP  --  8 7  --  7  --  8  --  10  --    < > 8   LUIS ALFREDO  --  8.2* 7.5*  --  7.9*  --  7.6*  --  7.6*  --    < > 8.5   GLC  --  125* 182*  --  133*  --  156*  --  200*  --     < > 136*   ALBUMIN  --   --   --   --   --   --   --   --  2.2*  --   --  2.6*   PROTTOTAL  --   --   --   --   --   --   --   --  5.0*  --   --  5.7*   BILITOTAL  --   --   --   --   --   --   --   --  1.0  --   --  1.1   ALKPHOS  --   --   --   --   --   --   --   --  100  --   --  120   ALT  --   --   --   --   --   --   --   --  10  --   --  11   AST  --   --   --   --   --   --   --   --  7  --   --  8    < > = values in this interval not displayed.

## 2020-08-13 NOTE — PLAN OF CARE
PT 6B: Cancel, pt not appropriate per RN, plans for care conference this afternoon and pt currently off the unit for procedure. PT will reschedule.

## 2020-08-13 NOTE — CONSULTS
A collaboration between [facility] and [facility]  Experts in minimally invasive, targeted treatments performed using imaging guidance    Interventional Radiology Consult Service Note    Patient Name:  Analy Martin   YOB: 1950  Medical Record Number (MRN):  [id number]  Age:  70 year old female    -----    Reason for Consult  Paracentesis     Ordered By    8/12/2020  4:11 PM  Siobhan Espinoza MD - [id number]  Call back #  [phone]    Lobo Kim MD - [id number]  Attending   [phone]     INR - 1.58  Plts - 53  COVID status 7/29 not detected, - admit 7/28  Anticoag meds in EMR - none    Copied: [70 y.o. F with past medical history of HFpEF, CVA, aortic stenosis and mitral stenosis, HTN, alcoholic cirrhosis (c/b portal hypertensive gastropathy, ascites, encephalopathy, esophageal varices, and bone marrow aplasia), DM type II, CKD stage IV with chronic anemia, morbid obesity, hypothyroidism, wheelchair-bound d/t CIDP, and depression who is transferred from the MICU 8/9 after hemodynamic stabilization for GI bleed and hypotension. ]    Abd US 8/12:  Ascites throughout the abdomen and pelvis, increased from prior paracentesis assessment exam.    -----    PLAN: Request, patient data, and imaging reviewed.     Add-on for IR procedure: diagnostic and therapeutic paracentesis; 2 L max    [phone] (IR RN control desk)  [phone] ([place] IR call pager)    DEXTER Kaye, PA-C  Physician Assistant - Certified  Interventional Radiology

## 2020-08-13 NOTE — PROGRESS NOTES
I have personally visited with the patient on 8/13/20. I agree with the documentation as noted by the Nephrology Resident dated 8/13/20.     Patient was transferred out of the ICU yesterday. She is undergoing paracentesis today. Her dose of midodrine was increased yesterday to 10 mg tid. Her urine output was minimal yesterday but since midnight she has made 200 ml. Recommend to give another 120 mg IV Lasix now and will reassess. No indications for dialysis at this point.     We will continue to follow along.

## 2020-08-13 NOTE — PLAN OF CARE
Major Shift Events:  No major events overnight; c/o abd pain, oxy x 2; contnues to have episodes of spitting up bile, zofran ODT x 1; Pt wanted to rest overnight. CHAU New MD notified.       Plan: Will continue to monitor and with POC.  For vital signs and complete assessments, please see documentation flowsheets.

## 2020-08-13 NOTE — PROGRESS NOTES
Patient Name: Analy Martin  Medical Record Number: [id number]  Today's Date: 8/13/2020    Procedure: Diagnostic and Therapeutic Paracentesis  Proceduralist: Dr. Mitchel Edmonds and Dr. Dinh Mcintosh    Procedure Start: 1005  Procedure end: 1030  Sedation medications administered: Lidocaine 1% only    Report given to: BERE Reddy 6B  : n/a    Other Notes: Pt arrived to IR room #6 from Unit 6B. Consent reviewed. Pt denies any questions or concerns regarding procedure. Pt positioned supine and monitored per protocol. Paracentesis done. Drained 2000 mL of Ascites fluid from right abdomen. Sent 120 mL of ascites fluid to lab for ordered tests. Pt tolerated procedure without any noted complications. Pt transferred back to Unit 6B.

## 2020-08-13 NOTE — PROGRESS NOTES
Social Work Services Progress Note    Hospital Day: 16  Date of Initial Social Work Evaluation:  7/31/2020 - please see for details  Collaborated with:  [place] [address] Team, Chart Review    Data:  SW following for TCU placement at discharge. Per update from [facility], Pt will remain hospitalized through the weekend. Pt is being assessed by Nephrology to potentially start HD, and her nutrition plan is TBD.     SW left VM for Jenn in Admissions at [facility] T[facility] (Ph: [phone], Admissions: [phone], F: [phone]) with update on possible discharge next week. SW to f/u when Pt is closer to discharge date.      Intervention:    -Discharge planning  -Care coordination    Assessment:  Pt requiring IP hospitalization at this time.    Plan:    Anticipated Disposition:  Facility:  TCU (TBD)    Barriers to d/c plan:  Medical stability    Follow Up:  SW to continue to follow and assist with discharge plan.    ALANNAH Chen, LGSW  6B Intermediate Care Unit   S[facility]  Phone: [phone]  Pager: [phone]

## 2020-08-13 NOTE — PROGRESS NOTES
[facility], [place]      Acceptance Note - Markrista 2 Service       Date of Admission:  7/28/2020    Assessment & Plan   Caryl Martin is a 70 y.o. F with past medical history of HFpEF, CVA, aortic stenosis and mitral stenosis, HTN, alcoholic cirrhosis (c/b portal hypertensive gastropathy, ascites, encephalopathy, esophageal varices, and bone marrow aplasia), DM type II, CKD stage IV with chronic anemia, morbid obesity, hypothyroidism, wheelchair-bound d/t CIDP, and depression who is transferred from the MICU 8/9 after hemodynamic stabilization for GI bleed and hypotension.     For full details of admission, please see interim summary dated 8/10/2020.    Updates today:   - NPO- hold non-essential PO medictaions  - para with max 2L draw today, albumin repletion  - addressed concerns regarding multiple organ damage/failure and future treatment goals - care conference with family, specialists and palliative today at 1400  - will continue aggressive cares    # Hepatic encephalopathy  # Acute on chronic decompensated EtOH cirrhosis with ascites  # Alcohol use disorder in remission  Multiple (>6 volodymyr) within the past month, none concerning for SBP. Two episodes of hepatic encephalopathy provoked initially d/t ESBL UTI and secondly by VRE UTI + C.diff infection + GI bleed. Patient is continued on rifaximin and is not currently encepahlopathic but lactulose held in the setting of c.diff and ileus. Not a transplant candidate.   >Ascites: hold home torsemide d/t BP, para (max 2L draw) today, No history of SBP, ceftriaxone IV ppx  >HE: Continue rifaxamin, hold lactulose and Miralax 17g BID. Delirium precautions  >Diet: Na restriction to 2000 mg daily, 1.5 mg/kg/day protein when able to take PO  >Varices:  IV pantoprazole 40 mg BID  - PT/OT  - Palliative Care consulted today    MELD-Na score: 27 at 8/13/2020  5:28 AM  MELD score: 25 at 8/13/2020  5:28 AM  Calculated from:  Serum Creatinine: 3.44 mg/dL  at 8/13/2020  5:28 AM  Serum Sodium: 133 mmol/L at 8/13/2020  5:28 AM  Total Bilirubin: 1.5 mg/dL at 8/13/2020  5:28 AM  INR(ratio): 1.58 at 8/13/2020  5:28 AM  Age: 70 years 5 months      #Oliguric MARCELO on CKD stage IV  Pt initially presented with elevated serum cr in setting of hypovolemic shock, thought to be secondary to ATN and pt slowly recovered to baseline sCr 2-3. With repeat upper GI bleed 8/7, sCr continues to trend upward and pt is now oliguric. Neph re-consulted 8/12 and pt was underwent lasix challenge with 50 g albumin followed by lasix 120 mg. 275 ccs out since  - Nephrology consulted, appreciate recs  - Avoid nephrotoxins  - restart calcitriol when able to take PO  - Hold PTA sodium bicarb 1300 mg BID, sevelamer 1600 mg TID with meals  - Wellington for strict I/Os    # C.Diff  # ileus  Developed 8/7 likely secondary to c.diff. 8/12 AXR shows continued diffusely air distended small and large bowel consistent with adynamic ileus.   - NPO  - lyte replacement  - decreased oxycodone   - vancomycin PO QID (8/7- present) * 8/16 for 10 day course  - Lactulose and Miralax BID held for ileus, continue rifaximin for HE ppx     # Severe Malnutrition due to chronic illness  Meets malnutrition criteria based on decreased intake and edema  - will discuss the difficult situation that we are in regarding nutrition - TPN not a great option given oliguric/anuric renal failure in setting of cirrhosis, NJ/NG not attractive options given her recent esophageal variceal banding and ulcerations, ileus, and PEG not great option either given ileus, ascites. Will discuss GOC today in conference    # VRE UTI, candiduria  # urinary retention   Wellington placed 8/12 for strict I/Os in setting of worsening ckd and oliguric marcelo. Patient's 8/12 UA with increased WBC (74, up from 19) and increased leuk esterase. Will add ceftriaxone  - ceftriaxone (8/13-present)  - linezolid PO bid (8/7-8/13) completed  - fluconazole (8/12)  - hold PTA  oxybutynin and tamsulosin in setting of matthews    # Acute on chronic normocytic anemia   # Esophageal varices s/p banding x5  # Hypovolemic shock, resolved  # Thrombocytopenia of chronic liver disease   - GI Hepatology consulted and signed off 8/11   - Continue PO pantoprazole BID   - Monitor stool output   - Monitor Hgb BID, transfuse if <7  - Zofran, Maalox, and Tums PRN  - Incentive spirometry     #HFpEF  - Hold PTA torsemide due to MARCELO on CKD     #IDDM  PTA on insulin glargine 14u QHS. Well-controlled, A1c 5.3% on 07/15. At OSH, was on medium SSI and 7u glargine QHS.   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  - Micatin powder PRN     #Gout  - Hold PTA allopurinol will discuss restart with Neph if needed      Diet: NPO  Fluids: dextrose 10% 75 ml/hr  Lines: 2PIVs  DVT Prophylaxis: Pneumatic Compression Devices  Matthews Catheter: in place, indication: Strict 1-2 Hour I&O, Strict 1-2 Hour I&O  Code Status: DNR/DNI       Disposition Plan   Expected discharge: unclear, recommended to TCU vs home once GOC established and TCU placement found if pt opting for aggressive txt.   Entered: Chava Azevedo MD 08/13/2020, 7:41 AM     The patient's care was discussed with the Attending Physician, Dr. Kim.    Chava Azevedo MD  [place] [id number] Service  Internal Medicine, PGY-1  [facility]  Pager: [phone]    ______________________________________________________________________    Interval History   NNR, no acute events  "overnight.     Patient continues with decreased UOP and uptrending creatinine, even in setting of lasix 120 mg. She states her abdominal pain has resolved but continues with nausea and bilious vomiting. Belly still feels distended. One stool overnight. BP were stable. Not wanting to take PO medications due to discomfort.     We discussed the difficulties of Analy's care today and that further aggressive treatment may not be in line with her previously stated goals of going home. We fear that continued aggressive management may lead to continue hospitalization and that she may never be \"well enough\" to go home versus opting for     Data reviewed today: I reviewed all medications, new labs and imaging results over the last 24 hours. I personally reviewed new labs.    Physical Exam   Vital Signs: Temp: 97.5  F (36.4  C) Temp src: Oral BP: 115/62 Pulse: 91 Heart Rate: 89 Resp: 16 SpO2: 100 % O2 Device: None (Room air)    Weight: 207 lbs 3.72 oz  General: Alert, answering questions appropriately, laying in bed, appears uncomfortable  HEENT: desquamation of [id number] plaque on sternum  CV: Regular rate and rhythm, blowing 3/6 holosystolic murmur, no clicks or rubs  Resp: Non-labored breathing, CTAB  Abd: soft, distended, tympanic, hypoactive BS, mild tenderness to palpation especially in epigastric region. Large midline hernia and subumbilical healed surgical scar  Skin: Dry flaking skin over feet. Chronic venous stasis changes to BLEs, pitting edema bilateral LE to knee  Neuro: Alert and oriented to self, place, and time. Follows commands. No asterixis  Psych: Appropriate affect, sad, tearful    Data    Recent Labs   Lab 08/13/20  0528 08/12/20  0947 08/12/20  0346 08/11/20  0350  08/09/20  0316  08/08/20  0356   WBC 6.5  --  7.5 6.4  --  5.3   < > 5.4   HGB 7.6* 7.5* 7.7* 7.3*   < > 7.5*   < > 7.4*   MCV 98  --  98 98  --  99   < > 98   PLT 38*  --  53* 41*   < > 54*   < > 57*   INR 1.58*  --   --   --   --  1.37*  --  " 1.67*     --  133 134   < > 138  --  139   POTASSIUM 4.5  --  4.3 4.2   < > 4.3  --  4.3   CHLORIDE 107  --  108 110*   < > 113*  --  111*   CO2 17*  --  16* 17*   < > 17*  --  18*   BUN 68*  --  65* 62*   < > 53*  --  52*   CR 3.44*  --  3.15* 2.73*   < > 2.53*  --  2.50*   ANIONGAP 9  --  8 7   < > 8  --  10   LUIS ALFREDO 8.1*  --  8.2* 7.5*   < > 7.6*  --  7.6*   *  --  125* 182*   < > 156*  --  200*   ALBUMIN 2.8*  --   --   --   --   --   --  2.2*   PROTTOTAL 5.6*  --   --   --   --   --   --  5.0*   BILITOTAL 1.5*  --   --   --   --   --   --  1.0   ALKPHOS 100  --   --   --   --   --   --  100   ALT 10  --   --   --   --   --   --  10   AST 8  --   --   --   --   --   --  7    < > = values in this interval not displayed.

## 2020-08-13 NOTE — PROGRESS NOTES
Admitted/transferred from:    Reason for admission/transfer: transferring to a lower level of care.   Patient status upon admission/transfer: Stable  Interventions: none  Plan: Continue with plan of care

## 2020-08-13 NOTE — PROGRESS NOTES
"SPIRITUAL HEALTH SERVICES  SPIRITUAL ASSESSMENT Progress Note  [facility] ([facility]) 6B   ON-CALL VISIT    REFERRAL SOURCE: Responded to a spiritual health consult    Analy and I shared a reflective conversation regarding her next steps. Analy stated \"It is all so difficult, I keep praying to get better and I don't think I will get better. I don't know what to do.\" We reflected on her family, which she named as an area of comfort, her eula, and her illness. We spoke about prayers for comfort and peace as a way of coping with the \"unknown\". Analy noted that prayers for comfort are difficult because she deeply wants healing. I continued to validate her and support her throughout our conversation as we reflected about her, her family, and her eula. At the end of our visit we shared in prayer together per her request.     PLAN: I will inform the unit  of her continued desire for follow up support.     Aquiles Lee, [facility], DMin  Staff   Pager [phone]      "

## 2020-08-13 NOTE — PROGRESS NOTES
[facility]  Palliative Care Daily Progress Note       Recommendations & Counseling       Continue current plan of care for now. Will reassess over time decision making and plan day by day with family as her course continues. Palliative will continue to follow for goals and support.      Would avoid opioids if at all possible in setting of ileus      Would consider scheduling the compazine q 6 hours for antiemetic treatment during ileus (prefer compazine over zofran because zofran is constipating)      Care conference:  Medicine, nephrology and palliative teams present. Medicine team reviewed medical conditions including liver disease, recent issues with infection and gi bleeding, kidney injury with decreasing urine output, ileus with poor nutrition. Reviewed how she is not needing dialysis right now but this could become an issue in near future and discussed how dialysis might help treat some syptoms but would not change her overall prognosis and would come with complications and its  own symptom burden and that pt may be faced with decision about whether she would want dialysis. We also talked about her nutritional status and how we cannot offer NG or G tube feeding and only options are TPN vs waiting with NPO status until her ileus improves. We discussed concerns about risks with TPN  Especially given her renal and liver dysfunction and made plan to hold off on initiation of TPN until we see if she can start making more urine. Finally we reviewed how her overall prognosis is limited and going forward she will be expected to have more medical setbacks over time. We reviewed what restorative vs comfort only care plan would mean and she has a choice over which approach she values more. We did not see decisions about care plan today but rather to provide information to allow pt and family to discuss together what they wish to do.         Total time spent was 100 minutes,   >50% of time was spent counseling and/or coordination of care regarding goals of care and symptom support for patient with ESLD, MARCELO, recent GI bleeding, ileus and malnutrition. FTF time [id number] (my own visit with patient) and again [id number] in care conference with patient and medical teams. Additional time with chart review and documentation.     Tamera Ruiz MD / Palliative Medicine / Pager [phone] / [facility] Inpatient Team Consult Pager [phone] (answered 8am-430pm M-F) - ok to text page via [employer] / After-Hours Answering Service [phone] / Palliative Clinic in the [facility] at the [facility] - [phone] (scheduling); [phone] (triage).        Assessments          71 yo female with PMHx DM with CKD4, chronic wheelchair dependence due to CIDP, decompensated alcoholic cirrhosis (with associated ascites, encephalopathy, esophageal varices, and bone marrow aplasia) admitted to OSH for septic shock 2/2 ESBL E coli UTI with course complicated by UGIB from esophageal varices, transferred to [facility] 7/28 for ongoing management and her hospital course has been complicated with hypotension, GI bleed, MARCELO, ileus with poor nutritional status. Currently there is concern for worsening renal function and poor nutrition with high risk of TPN due to liver dysfunction and MARCELO with difficult fluid status.      Today, the patient was seen for:  Goals of care   Patient support  ESLD  MARCELO  Ileus with poor nutrition      Prognosis, Goals, or Advance Care Planning was addressed today with: Yes.  Mood, coping, and/or meaning in the context of serious illness were addressed today: Yes.  Summary/Comments:Zoroastrian[employer] eula,  visited today            Interval History:     Chart review/discussion with unit or clinical team members:   Per medicine resident pt has had decreasing urine output and they are reaching out to renal service for further guidance  Also planning for care conference this afternoon    Per  patient or family/caregivers today:  Feels better today after paracentesis as well as after getting both dilaudid and compazine for abdominal pain and nausea  Nausea has been an ongoing issue for her with the ileus  More anxious today aafter hearing that her condition may not get better    Key Palliative Symptoms:  # Pain severity the last 12 hours: low  # Dyspnea severity the last 12 hours: low  # Nausea severity the last 12 hours: moderate  # Anxiety severity the last 12 hours: moderate    Patient is on opioids: assessed and I made recommendations about bowel care as above.           Review of Systems:     Besides above, an additional focused  system ROS was reviewed and is unremarkable          Medications:     I have reviewed this patient's medication profile and medications during this hospitalization.    Noted meds:  - multiple oral medications on hold  Gabapentin 300mg at bedtime -- on hold  Hydromorphone 0.5mg x 1 today  Lidocaine patch  pridnisone 5mg daily  Rifaximin    Miralax, lactulose, mirapex-- all on hold    Hydroxyzine 10mg tid prn -- use 0-2 per day  Melatonin - intermittent use, none x 3 nights  Ondansetron 4mg - using 0-2 doses per day, x 2 already today  Oxycodone 2.5mg q 6 hours prn - x 2 yesterday, x 1 this am  Compazine  - 10mg q 6 hours prn - x 0-1 per day, x1 today             Physical Exam:   Vitals were reviewed  Temp: 97.7  F (36.5  C) Temp src: Oral BP: 101/48 Pulse: 86 Heart Rate: 89 Resp: 18 SpO2: 100 % O2 Device: None (Room air)    Gen: sitting/lying in bed. Appears comfortable but frail and fatigued  HEENT: NCAT. Conjunctiva clear. Sclera anicteric .  CV: RRR , Peripheral perfusion intact.   Resp: unlabored work of breathing,   Abd: soft, distended, hypoactive BS  Msk: no gross deformity, + sarcopenia  Skin:  no jaundice  Ext: warm, well perfused.   Neuro: face symmetric. EOM, vision, hearing grossly intact. Speech fluent. Moves all extremities   Mental status/Psych: alert.  Oriented. Asks/answers questions appropriately. Affect is calm and engaged but fatigued               Data Reviewed:     Reviewed recent pertinent imaging, comments:       Reviewed recent labs, comments:   MELD-Na score: 27 at 8/13/2020  5:28 AM  MELD score: 25 at 8/13/2020  5:28 AM  Calculated from:  Serum Creatinine: 3.44 mg/dL at 8/13/2020  5:28 AM  Serum Sodium: 133 mmol/L at 8/13/2020  5:28 AM  Total Bilirubin: 1.5 mg/dL at 8/13/2020  5:28 AM  INR(ratio): 1.58 at 8/13/2020  5:28 AM  Age: 70 years 5 months    cr 3.44 (4.6 on admission, improved to 2.3 8/5 and slowly worsening since)

## 2020-08-13 NOTE — PROGRESS NOTES
Nephrology Progress Note  08/13/2020         Assessment & Recommendations:   Analy Martin is a 70 year old year old female with PMHx of DM c/b CKD 4, decompensated alcoholic cirrhosis with ascites, hepatic encephalopathy and esophageal varices; admitted to OSH for septic shock 2/2 ESBL E. Coli UTI. Course complicated by acute UGIB 2/2 esophageal varices, and was transferred to [facility] on 7/28 for further management and worsening MARCEOL, cr=5.44 (baseline 2-3). Nephrology was initially consulted for increased creatinine on admission (5.44), which was due to septic shock and bleed, went down to mid 2s and we signed off. She was then transferred on 8/7 to ICU for GI bleed and melena.  Nephrology was consulted again for creatinine increasing in past couple days to 3.15 in setting of hypotension in the 70s. She had urinary retention for which she had intermittent catheterization.     MARCELO on CKD  Patient was hypotensive overnight and creatinine had been increasing in last 5 days, from mid 2s to 3.15 yesterday. Seems to be ATN caused by hypotension and UGIB, and given the lack of response to fluids. Creatinine today at 3.44. BP in the 100s-110s. Patient minimally responsive to Lasix 120 mg yesterday (outs 125 yesterday, 200 today)  - Increase midodrine to 15mg TID.  - Give Lasix 120 mg again to diurese patient.  - Hold dialysis for now; no urgent need, plan to discuss with patient and family.     Recommendations were communicated to primary team via note.  Seen and discussed with Dr. Miranda who agrees with above assessment and plan.     Yessi Agarwal MD  Internal Medicine resident PGY-1  [phone]    Interval History :   Nursing and provider notes from last 24 hours reviewed.  In the last 24 hours Analy Martin was transferred back to the floor. She says she's not SOB but has been getting weaker.  Undergoing paracentesis today to r/o SBP.    Review of Systems:   A comprehensive of systems was negative except as noted  above.    Physical Exam:   I/O last 3 completed shifts:  In: 1545 [I.V.:445; IV Piggyback:1000]  Out: 325 [Urine:325]   /62   Pulse 91   Temp 97.5  F (36.4  C) (Oral)   Resp 16   Ht 1.524 m (5')   Wt 94 kg (207 lb 3.7 oz)   SpO2 100%   BMI 40.47 kg/m       General: Alert, answering questions appropriately, appears uncomfortable  CV: Regular rate and rhythm, no rubs or clicks  Edema: 2-3+ bilateral lower extremity edema  Respiratory: difficult to examine due to patient lying in bed, in pain and nauseated  Abdominal: soft, distended, tympanic, hypoactive BS, mild tenderness to diffuse palpation.  Neuro: Alert and oriented to self, place, and time. Follows commands. No asterixis  Psych: Appropriate affect    Labs:   All labs reviewed by me  Electrolytes/Renal -   Recent Labs   Lab Test 08/13/20 0528 08/12/20 0346 08/11/20  0350 08/10/20  0303 08/09/20  0316 08/08/20  0356 08/07/20 2016    133 134 137 138 139 143   POTASSIUM 4.5 4.3 4.2 4.6 4.3 4.3 4.6   CHLORIDE 107 108 110* 112* 113* 111* 116*   CO2 17* 16* 17* 17* 17* 18* 18*   BUN 68* 65* 62* 55* 53* 52* 52*   CR 3.44* 3.15* 2.73* 2.58* 2.53* 2.50* 2.54*   * 125* 182* 133* 156* 200* 163*   LUIS ALFRDEO 8.1* 8.2* 7.5* 7.9* 7.6* 7.6* 7.8*   MAG  --   --   --  1.8  --  2.1 1.4*   PHOS  --  2.6  --  2.6 3.2 3.4  --        CBC -   Recent Labs   Lab Test 08/13/20 0528 08/12/20  0947 08/12/20  0346 08/11/20 0350   WBC 6.5  --  7.5 6.4   HGB 7.6* 7.5* 7.7* 7.3*   PLT 38*  --  53* 41*       LFTs -   Recent Labs   Lab Test 08/13/20  0528 08/08/20  0356 08/07/20  0622   ALKPHOS 100 100 120   BILITOTAL 1.5* 1.0 1.1   ALT 10 10 11   AST 8 7 8   PROTTOTAL 5.6* 5.0* 5.7*   ALBUMIN 2.8* 2.2* 2.6*       Iron Panel -   Recent Labs   Lab Test 07/29/20  0420   IRON 51   IRONSAT 47*   TAMARA 352*         Current Medications:    cefTRIAXone  2 g Intravenous Q24H     [Held by provider] gabapentin  300 mg Oral At Bedtime     insulin aspart  1-7 Units Subcutaneous TID AC      insulin aspart  1-5 Units Subcutaneous At Bedtime     [Held by provider] insulin glargine  14 Units Subcutaneous At Bedtime     ipratropium  2 spray Nasal Daily     [Held by provider] lactulose  20 g Oral BID     [Held by provider] levothyroxine  75 mcg Oral QAM AC     lidocaine  1 patch Transdermal Q24H     lidocaine   Transdermal Q8H     linezolid  600 mg Intravenous Q12H     miconazole   Topical BID     midodrine  10 mg Oral or Feeding Tube TID w/meals     [Held by provider] oxybutynin ER  15 mg Oral Daily     pantoprazole (PROTONIX) IV  40 mg Intravenous BID     [Held by provider] polyethylene glycol  17 g Oral or Feeding Tube BID     [Held by provider] pramipexole  0.125 mg Oral At Bedtime     predniSONE  5 mg Oral Daily     rifaximin  550 mg Oral BID     [Held by provider] sevelamer carbonate  1,600 mg Oral TID w/meals     [Held by provider] sodium bicarbonate  1,300 mg Oral BID     sodium chloride (PF)  3 mL Intracatheter Q8H     sodium chloride (PF)  3 mL Intracatheter Q8H     sodium chloride (PF)  3 mL Intracatheter Q8H     [Held by provider] tamsulosin  0.4 mg Oral Daily     vancomycin  125 mg Oral 4x Daily     [Held by provider] vitamin D3  50 mcg Oral Daily       - MEDICATION INSTRUCTIONS -       dextrose 10% Stopped (08/11/20 9370)     Yessi Agarwal MD

## 2020-08-14 NOTE — CONSULTS
See consult completed 8/13.   This note entered only because consult box was not clicked on initial consult note.

## 2020-08-14 NOTE — PROGRESS NOTES
"CLINICAL NUTRITION SERVICES - BRIEF NOTE      Nutrition Prescription     RECOMMENDATIONS FOR MDs/PROVIDERS TO ORDER:  Diet advancement/start of nutrition support as medically able   --If TPN is decided, will max concentrate fluids and initiate cycling as soon as medically able      Recommendations already ordered by Registered Dietitian (RD):  None      Future/Additional Recommendations:  Continue to monitor nutrition status, medical condition to provide nutrition therapy and goals of care     *Please see full assessment note from 8/11/20    New Findings:  Diet: NPO (since 8/12)   --Clear Liquid or NPO since 8/10    See Medicine note 8/13 for goals of care discussion  \"Has ileus 2/2 C. difficile-unable to take oral vancomycin, not a candidate for NG placement.  Unable to do nutrition via TPN-risk of volume overload and infection plus liver injury, unable to do tube feeds given ileus\"    Last abdominal imaging 8/12: Diffusely air distended small and large bowel, similar to prior.     No interventions at this time. RD will continue to follow and provide nutrition support as medically able.     RD to follow per protocol.    Sofia Klein RD, LD  6B pager: [phone]              "

## 2020-08-14 NOTE — PROGRESS NOTES
Nephrology Progress Note  08/14/2020         Assessment & Recommendations:   Analy Martin is a 70 year old year old female with PMHx of DM c/b CKD 4, decompensated alcoholic cirrhosis with ascites, hepatic encephalopathy and esophageal varices; admitted to OSH for septic shock 2/2 ESBL E. Coli UTI. Course complicated by acute UGIB 2/2 esophageal varices, and was transferred to [facility] on 7/28 for further management and worsening MARCELO, cr=5.44 (baseline 2-3). Nephrology was initially consulted for increased creatinine on admission (5.44), which was due to septic shock and bleed, went down to mid 2s and we signed off. She was then transferred on 8/7 to ICU for GI bleed and melena.  Nephrology was consulted again for creatinine increasing in past couple days to 3.15 in setting of hypotension in the 70s. She had urinary retention for which she had intermittent catheterization.     MARCELO on CKD  Patient was again hypotensive overnight and creatinine had been increasing in last 5 days, from mid 2s to 3.15 yesterday. Seems to be ATN caused by hypotension and UGIB, and given the lack of response to fluids. Creatinine today at 3.62. BP in the 100s-120s. Patient a bit more responsive to Lasix 120 mg yesterday (outs 660 yesterday vs ins 280, 63 outs today). Wt is 89.4 down from 94.  Had care conference with family members, palliative care and primary team yesterday, explaining that short term dialysis for symptoms can be offered if patient desired but it would not prevent the symptoms from recurring. Also explained that dialysis will not change outcome of liver failure since that can be don with transplant, which patient is not a candidate for.  - We will continue to monitor renal function and will wait for patient decision regarding dialysis.  - Give another 120 mg of Lasix today. Recommend to discontinue IV fluids.     Recommendations were communicated to primary team via note.  Seen and discussed with Dr. Miranda who agrees with  "above assessment and plan.     Yessi Agarwal MD  Internal Medicine resident PGY-1  [phone]    Interval History :   Nursing and provider notes from last 24 hours reviewed.  In the last 24 hours Analy Martin says she feels better and that she had a bowel movement, she is less nauseated. States that she wants to \"keep going\" will treatments.  Had paracentesis yesterday which pulled 2L out, no signs of infection.    Review of Systems:   A comprehensive of systems was negative except as noted above.    Physical Exam:   I/O last 3 completed shifts:  In: 280 [P.O.:180]  Out: 498 [Urine:498]   BP 92/44 (BP Location: Right arm)   Pulse 92   Temp 97.6  F (36.4  C) (Oral)   Resp 16   Ht 1.524 m (5')   Wt 89.4 kg (197 lb 1.5 oz)   SpO2 96%   BMI 38.49 kg/m       General: Alert, answering questions appropriately, appears comfortable  Edema: 2+ bilateral lower extremity edema, less volume overloaded  Respiratory: difficult to examine due to patient lying in bed, in pain and nauseated  Abdominal: soft, distended, tympanic  Neuro: Alert and oriented to self, place, and time. Follows commands. No asterixis  Psych: Appropriate affect    Labs:   All labs reviewed by me  Electrolytes/Renal -   Recent Labs   Lab Test 08/14/20  0439 08/13/20  0528 08/12/20  0346  08/10/20  0303 08/09/20  0316 08/08/20  0356    133 133   < > 137 138 139   POTASSIUM 4.3 4.5 4.3   < > 4.6 4.3 4.3   CHLORIDE 108 107 108   < > 112* 113* 111*   CO2 17* 17* 16*   < > 17* 17* 18*   BUN 73* 68* 65*   < > 55* 53* 52*   CR 3.62* 3.44* 3.15*   < > 2.58* 2.53* 2.50*   * 155* 125*   < > 133* 156* 200*   LUIS ALFREDO 8.0* 8.1* 8.2*   < > 7.9* 7.6* 7.6*   MAG 1.8  --   --   --  1.8  --  2.1   PHOS  --   --  2.6  --  2.6 3.2 3.4    < > = values in this interval not displayed.       CBC -   Recent Labs   Lab Test 08/13/20  0528 08/12/20  0947 08/12/20  0346 08/11/20  0350   WBC 6.5  --  7.5 6.4   HGB 7.6* 7.5* 7.7* 7.3*   PLT 38*  --  53* 41*       LFTs - "   Recent Labs   Lab Test 08/13/20  0528 08/08/20  0356 08/07/20  0622   ALKPHOS 100 100 120   BILITOTAL 1.5* 1.0 1.1   ALT 10 10 11   AST 8 7 8   PROTTOTAL 5.6* 5.0* 5.7*   ALBUMIN 2.8* 2.2* 2.6*       Iron Panel -   Recent Labs   Lab Test 07/29/20  0420   IRON 51   IRONSAT 47*   TAMARA 352*         Current Medications:    [Held by provider] gabapentin  300 mg Oral At Bedtime     [Held by provider] insulin aspart  1-7 Units Subcutaneous TID AC     [Held by provider] insulin aspart  1-5 Units Subcutaneous At Bedtime     [Held by provider] insulin glargine  14 Units Subcutaneous At Bedtime     ipratropium  2 spray Nasal Daily     [Held by provider] lactulose  20 g Oral BID     [Held by provider] levothyroxine  75 mcg Oral QAM AC     lidocaine  1 patch Transdermal Q24H     lidocaine   Transdermal Q8H     miconazole   Topical BID     midodrine  15 mg Oral or Feeding Tube TID w/meals     [Held by provider] oxybutynin ER  15 mg Oral Daily     pantoprazole (PROTONIX) IV  40 mg Intravenous BID     [Held by provider] polyethylene glycol  17 g Oral or Feeding Tube BID     [Held by provider] pramipexole  0.125 mg Oral At Bedtime     predniSONE  5 mg Oral Daily     prochlorperazine  5 mg Intravenous Q6H     rifaximin  550 mg Oral BID     [Held by provider] sevelamer carbonate  1,600 mg Oral TID w/meals     [Held by provider] sodium bicarbonate  1,300 mg Oral BID     sodium chloride (PF)  3 mL Intracatheter Q8H     sodium chloride (PF)  3 mL Intracatheter Q8H     sodium chloride (PF)  3 mL Intracatheter Q8H     [Held by provider] tamsulosin  0.4 mg Oral Daily     vancomycin  125 mg Oral 4x Daily     [Held by provider] vitamin D3  50 mcg Oral Daily       - MEDICATION INSTRUCTIONS -       lactated ringers 75 mL/hr at 08/14/20 0829     Yessi Agarwal MD

## 2020-08-14 NOTE — PROGRESS NOTES
I have personally visited with the patient on 8/14/20. I agree with the documentation as noted by Dr. Agarwal dated 8/14/20.     Patient appears clinically improved. She made 120 ml of urine yesterday. She had a paracentesis yesterday and no evidence of SBP. She remains volume expanded. Recommend to discharge IV fluids. Recommend to give another 120 mg IV Lasix today.     We will continue to follow along.

## 2020-08-14 NOTE — PLAN OF CARE
BP 98/42   Pulse 92   Temp 97.6  F (36.4  C) (Oral)   Resp 16   Ht 1.524 m (5')   Wt 89.4 kg (197 lb 1.5 oz)   SpO2 96%   BMI 38.49 kg/m      Neuro: A&Ox4. More awake and alert from previous shifts. PERRLA. Weakness BLE significantly impaired and weakness BUE with minor resistance against gravity. Able to make needs known.   Cardiac: SR. VSS. Soft BP. Midodrine 15mg changed to 20mg.   Respiratory:  RA.  GI/: Adequate urine output via matthews. BM X5+, all liquid. Rectal pouch placed to contain and help skin irritation.   Diet/appetite: NPO for ileus healing  Activity:  lift, up to chair and in halls. Got up with 2 to stand at bedside with therapy.   Pain: discomfort in abdomen occassionally, denies need for pain meds.  Skin:  neck with redness from old blister,  groin and panus excoriation, blanchable redness on coccyx, left arm with skin tear  LDA's: PIV L arm saline locked    Plan: Continue with POC. Notify primary team with changes.  Midodrine upped from 15mg to 20mg and albumin given. PRN bouts of LR per MD. Lasix given as a one time dose.

## 2020-08-14 NOTE — PLAN OF CARE
Shift Summary    Been always asleep and lethargic though easily arousable, cooperative, pleasant, and agreeable. Always denied having pain/discomfort whenever she was asked. Been breathing regular, unlabored, and with equal chest expansion; on room air. Vital signs stable; been on NSR without ectopies/arrhythmia with rate  per tele monitoring tech. Lips and oral mucosa were very dry with scabbings; petroleum jell applied. NPO status except meds maintained. Had one episode of medium-amount brown watery incontinence late in the shift. Promptly cleansed and skin barrier paste applied. Continues to be oliguric with urine draining via Wellington catheter to gravity drain; on-call MD was reportedly aware of this per previous RN and that it will be discussed this morning during MD round. No acute/new issue overnight.

## 2020-08-14 NOTE — PROGRESS NOTES
[facility], [place]      Acceptance Note - [facility] Service       Date of Admission:  7/28/2020    Assessment & Plan   Caryl Martin is a 70 y.o. F with past medical history of HFpEF, CVA, aortic stenosis and mitral stenosis, HTN, alcoholic cirrhosis (c/b portal hypertensive gastropathy, ascites, encephalopathy, esophageal varices, and bone marrow aplasia), DM type II, CKD stage IV with chronic anemia, morbid obesity, hypothyroidism, wheelchair-bound d/t CIDP, and depression who is transferred from the MICU 8/9 after hemodynamic stabilization for GI bleed and hypotension. She continues to have acute renal failure with worsening creatinine, malnutrition without ideal nutritional support options, and prolonged ileus.     For full details of admission, please see interim summary dated 8/10/2020.    Updates today:   - NPO- hold non-essential PO medictaions  - patient opts to hold course with no aggressive treatment today - hold on nutrition, continue with diuresis and see if kidney/ileus improves     # Severe Malnutrition due to chronic illness  Meets malnutrition criteria based on decreased intake and edema. During care conference 8/13, discussed the difficult situation that we are in regarding nutrition - TPN not a great option given oliguric renal failure in setting of cirrhosis, NJ/NG not attractive options given her recent esophageal variceal banding and ulcerations, ileus, and PEG not great option either given ileus, ascites.   - continue NPO with no additional nutrition today, will re-evaluate tomorrow    #Oliguric MARCELO on CKD stage IV  Pt initially presented with elevated serum cr in setting of hypovolemic shock, thought to be secondary to ATN and pt slowly recovered to baseline sCr 2-3. With repeat upper GI bleed 8/7, sCr continues to trend upward and pt is now oliguric. Neph re-consulted 8/12 and pt was underwent lasix challenge with 50 g albumin followed by lasix 120 mg. 275 ccs out  since  - Nephrology consulted, appreciate recs  - Avoid nephrotoxins  - restart calcitriol when able to take PO  - Hold PTA sodium bicarb 1300 mg BID, sevelamer 1600 mg TID with meals  - Wellington for strict I/Os    # Hepatic encephalopathy  # Acute on chronic decompensated EtOH cirrhosis with ascites  # Alcohol use disorder in remission  Multiple (>6 volodymyr) within the past month, none concerning for SBP. Two episodes of hepatic encephalopathy provoked initially d/t ESBL UTI and secondly by VRE UTI + C.diff infection + GI bleed. Patient is continued on rifaximin and is not currently encepahlopathic but lactulose held in the setting of c.diff and ileus. Not a transplant candidate.   >Ascites: hold home torsemide d/t BP, para (max 2L draw) today, No history of SBP, ceftriaxone IV ppx  >HE: Continue rifaxamin, hold lactulose and Miralax 17g BID. Delirium precautions  >Diet: Na restriction to 2000 mg daily, 1.5 mg/kg/day protein when able to take PO  >Varices:  IV pantoprazole 40 mg BID  - PT/OT  - Palliative Care consulted, appreciate assistance in GOC    MELD-Na score: 26 at 8/14/2020  4:39 AM  MELD score: 25 at 8/14/2020  4:39 AM  Calculated from:  Serum Creatinine: 3.62 mg/dL at 8/14/2020  4:39 AM  Serum Sodium: 134 mmol/L at 8/14/2020  4:39 AM  Total Bilirubin: 1.5 mg/dL at 8/13/2020  5:28 AM  INR(ratio): 1.58 at 8/13/2020  5:28 AM  Age: 70 years 5 months      # C.Diff  # ileus  Developed 8/7 likely secondary to c.diff. 8/12 AXR shows continued diffusely air distended small and large bowel consistent with adynamic ileus. Discussed using opioids sparing and, if possible, not at all.   - NPO, lyte replacement  - AXR in AM 8/15  - vancomycin PO QID (8/7- present) * 8/16 for 10 day course  - vancomycin enema q6h while awake in setting of ileus, started 8/14    # VRE UTI, candiduria  # urinary retention   Wellington placed 8/12 for strict I/Os in setting of worsening ckd and oliguric augusto. Patient's 8/12 UA with increased WBC  (74, up from 19) and increased leuk esterase, did not reflex to culture. Will stop treating with fluconazole and ceftriaxone as pt is asymptomatic.   - ceftriaxone (8/13-8/14), linezolid PO bid (8/7-8/13) completed, fluconazole (8/12)  - hold PTA oxybutynin and tamsulosin in setting of matthews    # Acute on chronic normocytic anemia   # Esophageal varices s/p banding x5  # Thrombocytopenia of chronic liver disease   - GI Hepatology consulted and signed off 8/11   - Continue PO pantoprazole BID   - Monitor stool output   - Monitor Hgb BID, transfuse if <7  - Zofran, Maalox, and Tums PRN  - Incentive spirometry     #HFpEF  - Hold PTA torsemide due to MARCELO on CKD     #IDDM  PTA on insulin glargine 14u QHS. Well-controlled, A1c 5.3% on 07/15. At OSH, was on medium SSI and 7u glargine QHS.   - Continue medium dose sliding scale insulin   - hold scheduled insulin as pt NPO  - Hypoglycemia protocol      #Hypothyroidism   - PTA 75 mcg levothyroxine     #CIDP on chronic steroids  #Restless legs syndrome  - PTA 5 mg prednisone daily  - PTA gabapentin and pramipexole    #Bone mineral disorder  #Osteoarthritis  - PTA sevelamer   - Replace phos  - Pain management: topical lidocaine/capsaicin and low-dose oxycodone  - PT/OT    #Intertrigo  Rash diffusely under arms, breasts, pannus, and groin per nursing.  - Micatin powder PRN     #Gout  - Hold PTA allopurinol will discuss restart with Neph if needed      Diet: NPO  Fluids: LR 75 ml/hr  Lines: 2PIVs  DVT Prophylaxis: Pneumatic Compression Devices  Matthews Catheter: in place, indication: Strict 1-2 Hour I&O, Other (Comment)(special procedure per report)  Code Status: DNR/DNI       Disposition Plan   Expected discharge: unclear, recommended to TCU vs home once GOC established and TCU placement found if pt opting for aggressive txt.   Entered: Chava Azevedo MD 08/14/2020, 7:29 AM     The patient's care was discussed with the Attending Physician, Dr. Kim.    Chava Azevedo  MD [facility] [facility] Service  Internal Medicine, PGY-1  [facility], [place]  Pager: [phone]    ______________________________________________________________________    Interval History   NNR, no acute events overnight.     Patient had 650 ml out of urine yesterday, has minimal output this morning but have not dosed lasix. Has gotten 120 mg lasix for the past two days, uptrending creatinine. Pt feels very well this AM, no abdominal pain, nausea or vomiting. She has had 2 BMs, one soft and one loose. No passing of flatus. Caryl hopes to not proceed with intervention or aggressive treatment (TPN, dialysis) today and wishes to monitor. She is hopeful her kidneys will start functioning but understands that there is a possibility they wont. She states we can have a conversation regarding dialysis at that point and does not want to make a definitely statement today. Belly still feels distended. BP were stable. Tolerating PO meds and did not take dilaudid overnight. I believe Caryl has a good understanding of her difficult medical situation and that she does have good family support. She accurately described her options for nutrition and why each was not ideal in her situation. She is happy with the care she is getting here.     Data reviewed today: I reviewed all medications, new labs and imaging results over the last 24 hours. I personally reviewed new labs.    Physical Exam   Vital Signs: Temp: 98  F (36.7  C) Temp src: Oral BP: 103/58 Pulse: 92 Heart Rate: 90 Resp: 16 SpO2: 96 % O2 Device: None (Room air)    Weight: 197 lbs 1.46 oz  General: more alert today, answering questions appropriately, sitting in chair, appears comfortable  HEENT: desquamation of [id number] plaque on sternum, erythematous base, no surrounding erythema  CV: Regular rate and rhythm, blowing 3/6 holosystolic murmur, no clicks or rubs  Resp: Non-labored breathing, CTAB  Abd: soft, distended, tympanic, hypoactive BS, no tenderness to  palpation, Large midline hernia and subumbilical healed surgical scar  Skin: Dry flaking skin over feet. Chronic venous stasis changes to BLEs, pitting edema bilateral LE to knee  Neuro: Alert and oriented to self, place, and time. Follows commands. No asterixis  Psych: Appropriate affect, tangential thought processes    Data    Recent Labs   Lab 08/14/20  0439 08/13/20  0528 08/12/20  0947 08/12/20  0346 08/11/20  0350  08/09/20  0316  08/08/20  0356   WBC  --  6.5  --  7.5 6.4  --  5.3   < > 5.4   HGB  --  7.6* 7.5* 7.7* 7.3*   < > 7.5*   < > 7.4*   MCV  --  98  --  98 98  --  99   < > 98   PLT  --  38*  --  53* 41*   < > 54*   < > 57*   INR  --  1.58*  --   --   --   --  1.37*  --  1.67*    133  --  133 134   < > 138  --  139   POTASSIUM 4.3 4.5  --  4.3 4.2   < > 4.3  --  4.3   CHLORIDE 108 107  --  108 110*   < > 113*  --  111*   CO2 17* 17*  --  16* 17*   < > 17*  --  18*   BUN 73* 68*  --  65* 62*   < > 53*  --  52*   CR 3.62* 3.44*  --  3.15* 2.73*   < > 2.53*  --  2.50*   ANIONGAP 10 9  --  8 7   < > 8  --  10   LUIS ALFREDO 8.0* 8.1*  --  8.2* 7.5*   < > 7.6*  --  7.6*   * 155*  --  125* 182*   < > 156*  --  200*   ALBUMIN  --  2.8*  --   --   --   --   --   --  2.2*   PROTTOTAL  --  5.6*  --   --   --   --   --   --  5.0*   BILITOTAL  --  1.5*  --   --   --   --   --   --  1.0   ALKPHOS  --  100  --   --   --   --   --   --  100   ALT  --  10  --   --   --   --   --   --  10   AST  --  8  --   --   --   --   --   --  7    < > = values in this interval not displayed.

## 2020-08-14 NOTE — PLAN OF CARE
NEURO:  In AM- alert and oriented. Anxious and tearful about her course, frustrated with continuous nausea.  came and saw pt. Pt reported feeling less anxious in the evening. Pt was lethargic after compazine and dilaudid were given- aroused to voice. Care conference was conducted in afternoon with family member Iraida about her course of care.   RESPIRATORY: LS diminished, SpO2>90% on RA.  CARDIO: BP continues to be soft. Pt edematous throughout.   GI/: BS absent- hypoactive. Pt denies passing gas. Small smear of BM x1. Wellington in place and patent. Urine output decreasing throughout the day- MD notified. -147. Continues to be NPO. Pt reported nausea throughout AM and afternoon and had difficulty with any PO intake (I.e. medications)- given zofran and compazine. Compazine appeared to be more effective for pt.   SKIN: Coccyx reddened- mepilex applied and pulsate mattress ordered- still waiting to be delivered. Attempted to turn pt q 2hours- but pt refused occasionally due to nausea. Skin tear left arm- reddened and leaking serous fluid from small puncture like area- gauze and tegaderm applied.  Kimberly- area reddened- cleaned and miconazole powder applied.  neck- reddened, no drainage- MARAL.   ACTIVITY: Up with lift. Reposition with max 2 assist.   PAIN: Reported pain after paracentesis- given dilaudid IV x1. Pt reported relief.   LINES: PIV infusing- site WNL.   PLAN:  Continue POC - notify MD with concerns.

## 2020-08-14 NOTE — PROGRESS NOTES
[facility]  Palliative Care Daily Progress Note       Recommendations & Counseling       Would recommend discontinueing gabapentin (currently on hold, anticipate would not be helpful to restart in near future so would just discontinue it)    Continue scheduled compazine 5mg q 6 hours for nausea for now    Reduce prn IV dilaudid dose to 0.2mg and continue to avoid unless absolutely necessary (ordered for you)    Goals discussion today: For now goals remain restorative right now. She does say she wants to go home but also says that does not mean she wants to go home right now on hospice but rather it means that at some point when she is more medically stable she hopes to get out of the hospital to a living situation taht meets her needs --maybe that will be home but maybe that will be more like an assisted living facility or nursing facility. And she acknowledges that she may not be able to stabilize to get out of the hospital and at that point she would/might consider hospice. We agreed to follow along and offer ongoing goals support as her course continues.       Total time spent was 26 minutes,  >50% of time was spent counseling and/or coordination of care regarding goals of care and symptom management-pain, nausea- for pt with ESLD, MARCELO, ileus.    Tamera Ruiz MD / Palliative Medicine / Pager [phone] / [facility] Inpatient Team Consult Pager [phone] (answered 8am-430pm M-F) - ok to text page via [employer] / After-Hours Answering Service [phone] / Palliative Clinic in the [facility] [place] [facility] [place] [phone] (scheduling); [phone] (triage).          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 Currently there is concern for worsening renal function and poor nutrition with high risk of TPN due to liver dysfunction and MARCELO with difficult fluid status.      Today, the patient was seen for:  Goals of care   Patient support  ESLD  MARCELO  Ileus with poor nutrition    Prognosis, Goals, or Advance Care Planning was addressed today with: Yes.  Mood, coping, and/or meaning in the context of serious illness were addressed today: Yes.  Summary/Comments: planning to visit for support            Interval History:     Chart review/discussion with unit or clinical team members:   Had a BM this am    Per patient or family/caregivers today:  Overall feeling better today with improved symptom control.   Nausea controlled with scheduled compazine and also with possible resolving ileus (now passing gas and had a BM). No significant abdominal pain today.     Key Palliative Symptoms:  # Pain severity the last 12 hours: low  # Dyspnea severity the last 12 hours: none  # Nausea severity the last 12 hours: low  # Anxiety severity the last 12 hours: low    Patient is on opioids: assessed and bowels ok/no needed changes to plan of care today.           Review of Systems:     Besides above, an additional focused system ROS was reviewed and is unremarkable          Medications:     I have reviewed this patient's medication profile and medications during this hospitalization.    Noted meds:    Gabapentin 300mg at bedtime -- on hold  Hydromorphone 0.5mg x 1 today  Lidocaine patch  pridnisone 5mg daily  Rifaximin     Miralax, lactulose, mirapex-- all on hold     Hydroxyzine 10mg tid prn -- use 0-2 per day  Melatonin - intermittent use, none x 3 nights  Ondansetron 4mg - using 0-2 doses per day, x 2 already today  Oxycodone 2.5mg q 6 hours  prn - x 2 yesterday, x 1 this am  Compazine  - 10mg q 6 hours prn - x 0-1 per day, x1 today           Physical Exam:   Vitals were reviewed  Temp: 97.6  F (36.4  C) Temp src: Oral BP: 98/42 Pulse: 92 Heart Rate: 83 Resp: 16 SpO2: 96 % O2 Device: None (Room air)    Gen: sitting/lying in bed. Appears comfortable   HEENT: NCAT. Conjunctiva clear. Sclera anicteric .  CV: RRR , Peripheral perfusion intact.   Resp:  work of breathing,   Abd: soft, nt, distended, BS present  Msk: no gross deformity,   Skin:  no jaundice  Ext: warm, well perfused.   Neuro: face symmetric. EOM, vision, hearing grossly intact. Speech fluent. Moves all extremities   Mental status/Psych: alert. Oriented. Asks/answers questions appropriately. Affect is calm and smiling               Data Reviewed:     Reviewed recent pertinent imaging, comments:       Reviewed recent labs, comments:   ROUTINE ICU LABS (Last four results)  CMP  Recent Labs   Lab 08/14/20  0439 08/13/20  0528 08/12/20  0346 08/11/20  0350 08/10/20  0303 08/09/20  0316 08/08/20  0356 08/07/20 2016    133 133 134 137 138 139 143   POTASSIUM 4.3 4.5 4.3 4.2 4.6 4.3 4.3 4.6   CHLORIDE 108 107 108 110* 112* 113* 111* 116*   CO2 17* 17* 16* 17* 17* 17* 18* 18*   ANIONGAP 10 9 8 7 7 8 10 8   * 155* 125* 182* 133* 156* 200* 163*   BUN 73* 68* 65* 62* 55* 53* 52* 52*   CR 3.62* 3.44* 3.15* 2.73* 2.58* 2.53* 2.50* 2.54*   GFRESTIMATED 12* 13* 14* 17* 18* 18* 19* 18*   GFRESTBLACK 14* 15* 16* 19* 21* 21* 22* 21*   LUIS ALFREDO 8.0* 8.1* 8.2* 7.5* 7.9* 7.6* 7.6* 7.8*   MAG 1.8  --   --   --  1.8  --  2.1 1.4*   PHOS  --   --  2.6  --  2.6 3.2 3.4  --    PROTTOTAL  --  5.6*  --   --   --   --  5.0*  --    ALBUMIN  --  2.8*  --   --   --   --  2.2*  --    BILITOTAL  --  1.5*  --   --   --   --  1.0  --    ALKPHOS  --  100  --   --   --   --  100  --    AST  --  8  --   --   --   --  7  --    ALT  --  10  --   --   --   --  10  --      CBC  Recent [facility]   Lab 08/13/20  0528 08/12/20  0947  08/12/20  0346 08/11/20  0350  08/10/20  0303  08/09/20 0316   WBC 6.5  --  7.5 6.4  --   --   --  5.3   RBC 2.51*  --  2.56* 2.39*  --   --   --  2.48*   HGB 7.6* 7.5* 7.7* 7.3*   < > 8.6*   < > 7.5*   HCT 24.7*  --  25.2* 23.4*  --   --   --  24.5*   MCV 98  --  98 98  --   --   --  99   MCH 30.3  --  30.1 30.5  --   --   --  30.2   MCHC 30.8*  --  30.6* 31.2*  --   --   --  30.6*   RDW 18.3*  --  18.2* 18.2*  --   --   --  18.2*   PLT 38*  --  53* 41*  --  57*  --  54*    < > = values in this interval not displayed.     INR  Recent Labs   Lab 08/13/20  0528 08/09/20 0316 08/08/20  0356 08/07/20  2349   INR 1.58* 1.37* 1.67* 1.81*     Arterial Blood GasNo lab results found in last 7 days.

## 2020-08-14 NOTE — PLAN OF CARE
Discharge Planner OT   Patient plan for discharge: TCU   Current status: Pt mod A for rolling to place universal sling and dependent lift. Pt completed UE exercises seated EOB/chair with CGA. Pt with limited shoulder ROM however worsened with deconditioning.   Barriers to return to prior living situation: medical status, deconditioning, increased level of assist   Recommendations for discharge: TCU   Rationale for recommendations: Pt is below baseline and would benefit from continued skilled therapy to increase activity tolerance and independence with ADLs.        Entered by: Pippa Chong 08/14/2020 11:15 AM

## 2020-08-15 NOTE — PROGRESS NOTES
[facility], [place]      Acceptance Note - [facility] 2 Service       Date of Admission:  7/28/2020    Assessment & Plan   Caryl Martin is a 70 y.o. F with past medical history of HFpEF, CVA, aortic stenosis and mitral stenosis, HTN, alcoholic cirrhosis (c/b portal hypertensive gastropathy, ascites, encephalopathy, esophageal varices, and bone marrow aplasia), DM type II, CKD stage IV with chronic anemia, morbid obesity, hypothyroidism, wheelchair-bound d/t CIDP, and depression who is transferred from the MICU 8/9 after hemodynamic stabilization for GI bleed and hypotension. She continues to multisystem organ failure and severe calorie deficient malnutrition with poor prognosis.  Discussed starting some comfort cares today, introduction of hospice and will continue goals of care conversation.    For full details of admission, please see interim summary dated 8/10/2020.    Updates today:   - CLD as tolerated today  - pt opts for some comfort cares today, will start diet and prn nausea medications, discontinue rectal tube + vanco enemas, and touchabse with SW to see patient. Will continue the conversation tomorrow    # Severe Malnutrition due to chronic illness  Meets malnutrition criteria based on decreased intake and edema. During care conference 8/13, discussed the difficult situation that we are in regarding nutrition - TPN not a great option given oliguric renal failure in setting of cirrhosis, NJ/NG not attractive options given her recent esophageal variceal banding and ulcerations, ileus, and PEG not great option either given ileus, ascites.   - comfort CLD today    #Oliguric MARCELO on CKD stage IV  Pt initially presented with elevated serum cr in setting of hypovolemic shock, thought to be secondary to ATN and pt slowly recovered to baseline sCr 2-3. With repeat upper GI bleed 8/7, sCr continues to trend upward and pt is now oliguric. Neph re-consulted 8/12 and pt was underwent lasix  challenge with response and has been continued on diuresis.  - Nephrology consulted, appreciate recs  - Avoid nephrotoxins  - dose of bumex 2 mg today, replete fluids with 500 ml bolus  - midodrine 20 mg TID  - restart calcitriol when able to take PO  - Hold PTA sodium bicarb 1300 mg BID, sevelamer 1600 mg TID with meals  - Wellington for strict I/Os    # Hepatic encephalopathy  # Acute on chronic decompensated EtOH cirrhosis with ascites  # Alcohol use disorder in remission  Multiple (>6 volodymyr) within the past month, none concerning for SBP. Two episodes of hepatic encephalopathy provoked initially d/t ESBL UTI and secondly by VRE UTI + C.diff infection + GI bleed. Patient is continued on rifaximin and is not currently encepahlopathic but lactulose held in the setting of c.diff and ileus. Not a transplant candidate.   >Ascites: hold home torsemide d/t BP, may need a para week of 8/17, No history of SBP  >HE: Continue rifaxamin, hold lactulose and Miralax 17g BID. Delirium precautions  >Diet:  CLD  >Varices:  IV pantoprazole 40 mg BID  - PT/OT  - Palliative Care consulted, appreciate assistance in GOC    MELD-Na score: 26 at 8/14/2020  4:39 AM  MELD score: 25 at 8/14/2020  4:39 AM  Calculated from:  Serum Creatinine: 3.62 mg/dL at 8/14/2020  4:39 AM  Serum Sodium: 134 mmol/L at 8/14/2020  4:39 AM  Total Bilirubin: 1.5 mg/dL at 8/13/2020  5:28 AM  INR(ratio): 1.58 at 8/13/2020  5:28 AM  Age: 70 years 5 months      # C.Diff  # ileus  Developed 8/7 likely secondary to c.diff. 8/12 AXR shows continued diffusely air distended small and large bowel consistent with adynamic ileus. Discussed using opioids sparing and, if possible, not at all. AXR 8/15 showed Ongoing bowel distention redemonstrated suggesting ongoing  Ileus, with small bowel distension to 6.2 cm.   - NPO, lyte replacement  - vancomycin PO QID (8/7- present) * 8/16 for 10 day course  - discontinue vancomycin enema    # VRE UTI, candiduria  # urinary retention    Matthews placed 8/12 for strict I/Os in setting of worsening ckd and oliguric marcelo. Patient's 8/12 UA with increased WBC (74, up from 19) and increased leuk esterase, did not reflex to culture. Will stop treating with fluconazole and ceftriaxone as pt is asymptomatic.   - ceftriaxone (8/13-8/14), linezolid PO bid (8/7-8/13) completed, fluconazole (8/12)  - hold PTA oxybutynin and tamsulosin in setting of matthews    # Acute on chronic normocytic anemia   # Esophageal varices s/p banding x5  # Thrombocytopenia of chronic liver disease   - GI Hepatology consulted and signed off 8/11   - Continue PO pantoprazole BID   - Monitor stool output  - Zofran, Maalox, and Tums PRN  - Incentive spirometry     #HFpEF  - Hold PTA torsemide due to MARCELO on CKD     #IDDM  PTA on insulin glargine 14u QHS. Well-controlled, A1c 5.3% on 07/15. At OSH, was on medium SSI and 7u glargine QHS.   - Continue medium dose sliding scale insulin   - hold scheduled insulin as pt NPO  - Hypoglycemia protocol      #Hypothyroidism   - Hold PTA 75 mcg levothyroxine     #CIDP on chronic steroids  #Restless legs syndrome  - PTA 5 mg prednisone daily  - discontinue PTA gabapentin and hold pramipexole    #Bone mineral disorder  #Osteoarthritis  - hold PTA sevelamer   - Pain management: topical lidocaine/capsaicin and hydromorphone    #Intertrigo  Rash diffusely under arms, breasts, pannus, and groin per nursing.  - Micatin powder PRN     #Gout  - Hold PTA allopurinol will discuss restart with Neph if needed      Diet: NPO  Fluids: LR 75 ml/hr  Lines: 2PIVs  DVT Prophylaxis: Pneumatic Compression Devices  Matthews Catheter: in place, indication: Strict 1-2 Hour I&O, Strict 1-2 Hour I&O  Code Status: DNR/DNI       Disposition Plan   Expected discharge: unclear, recommended to TCU vs home once GOC established and TCU placement found if pt opting for aggressive txt.   Entered: Chaav Azevedo MD 08/15/2020, 7:36 AM     The patient's care was discussed with the Attending  Physician, Dr. Kim.    MD Debbie Sanchez[facility] 2 Service  Internal Medicine, PGY-1  [facility], [place]  Pager: [phone]    ______________________________________________________________________    Interval History   NNR, no acute events overnight.      Today Caryl notes that she has increased hunger and hunger pains.  She was having back and abdominal pain that improved with pain medication.  She is very hopeful that her body is making her recovery with her increased urine output today.  Discussed worsening prognosis in setting of decreased temperature, worsening ileus, downtrending platelet count, no improvement serum creatinine, and continued malnutrition.  This was discussed with the patient and her family.  We discussed hospice cares which the patient had already discussed yesterday with her healthcare advocate and daughter-in-law Iraida.  The patient still very hopeful for a miracle recovery but is open to learning more about hospice services.  Nursing to reach out to social work to see patient today.  We will continue the discussion tomorrow.  We discussed that it was reasonable to start to pursue comfort cares but to continue with other treatments and patient was open and appreciated to having a diet as tolerated today.  We will treat nausea if and when it arises.  She can continue to take pain medication even though it may worsen her ileus.  We will discontinue the rectal tube and vancomycin enema in the setting of significant thrombocytopenia as she is at increased risk of bleeding.  We discussed the recommendation for no escalation of cares; the patient was agreeable to this.    Caryl would benefit from continued emotional, psychological, and spiritual support.  Her family recommended she continue to get  services here, specifically requested a .  Encouraged her to continue the conversation after we left with her daughter-in-law.    Data reviewed today: I  reviewed all medications, new labs and imaging results over the last 24 hours. I personally reviewed new labs.    Physical Exam   Vital Signs: Temp: 97.5  F (36.4  C) Temp src: Axillary BP: 94/49 Pulse: 88 Heart Rate: 75 Resp: 12 SpO2: 99 % O2 Device: None (Room air)    Weight: 197 lbs 4.99 oz  General: alert, answering questions appropriately, comfortably lying in bed  HEENT: desquamation of [id number] plaque on sternum, erythematous base, no surrounding erythema  CV: Regular rate and rhythm, blowing 3/6 holosystolic murmur, no clicks or rubs  Resp: Non-labored breathing, CTAB  Abd: soft,significantly distended (more significant on left side of abdomen), tympanic, hypoactive BS, no tenderness to palpation, Large midline hernia and subumbilical healed surgical scar  Skin: Dry flaking skin over feet. Chronic venous stasis changes to BLEs, pitting edema bilateral LE to knee  Neuro: Alert and oriented to self, place, and time. Follows commands. No asterixis  Psych: Appropriate affect, tangential thought processes, tearful and sad although still making jokes intermittently    Data    Recent Labs   Lab 08/15/20  0622 08/15/20  0536 08/14/20  0439 08/13/20  0528  08/12/20  0346  08/09/20  0316   WBC 4.4 Canceled, Test credited  --  6.5  --  7.5   < > 5.3   HGB 7.2* Canceled, Test credited  --  7.6*   < > 7.7*   < > 7.5*   * Canceled, Test credited  --  98  --  98   < > 99   PLT 22* Canceled, Test credited  --  38*  --  53*   < > 54*   INR  --   --   --  1.58*  --   --   --  1.37*   NA  --   --  134 133  --  133   < > 138   POTASSIUM  --   --  4.3 4.5  --  4.3   < > 4.3   CHLORIDE  --   --  108 107  --  108   < > 113*   CO2  --   --  17* 17*  --  16*   < > 17*   BUN  --   --  73* 68*  --  65*   < > 53*   CR  --   --  3.62* 3.44*  --  3.15*   < > 2.53*   ANIONGAP  --   --  10 9  --  8   < > 8   LUIS ALFREDO  --   --  8.0* 8.1*  --  8.2*   < > 7.6*   GLC  --   --  132* 155*  --  125*   < > 156*   ALBUMIN  --   --   --  2.8*  --    --   --   --    PROTTOTAL  --   --   --  5.6*  --   --   --   --    BILITOTAL  --   --   --  1.5*  --   --   --   --    ALKPHOS  --   --   --  100  --   --   --   --    ALT  --   --   --  10  --   --   --   --    AST  --   --   --  8  --   --   --   --     < > = values in this interval not displayed.

## 2020-08-15 NOTE — PLAN OF CARE
Neuro: A&Ox4. Neuros intact. PERRLA. Generalized weakness in lower extremities. Numbness/Tingling present in BLE @ baseline  Cardiac: SR. Murmur present. OVSS. 's   Respiratory: Sating % on RA.  GI/: Wellington in place, patent. Continuous loose stooling. Internal Rectal tube placed for containment, Vanco enema. Partially effective.  Diet/appetite: NPO except for meds.  Activity:  Lift assist up to chair.  Pain: At acceptable level on current regimen. Concerned about rectal pain 2/2 rectal tube. Premedicated w/PRN dilaudid x1 prior to insertion.  Skin: No new deficits noted.  LDA's: L PIV SL.    Plan: Continue with POC. Notify primary team with changes.

## 2020-08-15 NOTE — PLAN OF CARE
PT 6B: Up with use of lift    Discharge Planner PT   Patient plan for discharge: Rehab  Current status: Engaged pt in bed mobility with min A to exit and mod A x 2 to re-enter bed at end of session, sitting at EOB at SBA, sit <> stand from elevated EOB with min A x 2 x 5 reps, standing tolerance for ~10 seconds. Pt on RA with AVSS.   Barriers to return to prior living situation: medical status, abdominal pain  Recommendations for discharge: TCU  Rationale for recommendations: Pt is below baseline for mobility and would benefit from rehab to improve strength, balance, and endurance.        Entered by: Zeynep Florentino 08/14/2020 8:35 PM

## 2020-08-15 NOTE — PROGRESS NOTES
Nephrology Progress Note  08/15/2020         Assessment & Recommendations:   Analy Martin is a 70 year old year old female with PMHx of DM c/b CKD 4, decompensated alcoholic cirrhosis with ascites, hepatic encephalopathy and esophageal varices; admitted to OSH for septic shock 2/2 ESBL E. Coli UTI. Course complicated by acute UGIB 2/2 esophageal varices, and was transferred to [facility] on 7/28 for further management and worsening MARCELO, cr=5.44 (baseline 2-3). Nephrology was initially consulted for increased creatinine on admission (5.44), which was due to septic shock and bleed, went down to mid 2s and we signed off. She was then transferred on 8/7 to ICU for GI bleed and melena.  Nephrology was consulted again for rising creatinine     Oliguric MARCELO on CKD  Likely etiology: ATN caused by hypotension and UGIB, and given the lack of response to fluids.  BP : Patient intermittently hypotensive   Volume status - Hypervolemic though mostly thirdspacing in the setting of underlying liver disease . Albumin is 2.8   She has responded partially to Lasix.   Electrolytes: Hyperkalemia 2/2 MARCELO, Related acidosis  Acid Base: Metabolic acidosis . ABG will assist with accurate assessment but to avoid more aggressive tests, we will check Venous gas   Anemia: Hb goal> 10 . Hb  7.2 . Transfusion parameters per primary team   BMD: Calcium 8.2 Phosphorus  2.6  Nutrition: Albumin : 2.8    Patient MARCELO gradually worsening with background CKD .     Recommendations   1.  ml bolus - agree with this plan   2. Bumex  2 mg IV given - agree with this plan   3. If UOP picks up and > 500 in the next 12 hours, we recommend another  ml bolus .  -- we cannot be too aggressive with IV fluids due to risk of hydrothorax   4. Please check Venous gas and Renal panel, ionized Calcium  at 4 PM . If metabolic acidosis persists , start D5NS with 150 meq Sodium bicarbonate at 30 ml/hr .     5. Since she is not a Liver Tx candidate, her prognosis is  poor   -- Median survival of patient with  liver failure on dialysis is 21 days (Clin J Am Soc Nephrol 13: 16-25, 2018).  -- Even though we offer her dialysis , her overall prognosis is not going to change . This has been discussed with patient .   -- Primary team is  Having regular goals of care conferences including options of hospice care , which is a very reasonable approach , given her poor prognosis    6. If  K>5.9 , please call Nephrology on call        Recommendations were communicated to primary team via note and verbally   Seen and discussed with Dr. Bhumi Campbell MD, FACP  Nephrology Fellow   [facility]   Pager [phone]    Interval History :   Nursing and provider notes from last 24 hours reviewed.  In the last 24 hours Analy Martin says she feels better , non oliguric ,Cr stable, K rising . Still NPO.     Review of Systems:     Constitutional : No fever. Chills  Neuro: No confusion, headache, focal weakness  Chest: No SOB . No cough  Cardiovascular : No Chest pain   Extremities:  + leg swelling  Gastrointestinal : Abd discomfort improved , passing gases. Having BM   No abdominal pain, nausea,vomiting. No diarrhea, constipation. No black stool .   : No flank pain , hematuria or voiding difficulty   Behavior: No agitation  Skin : No rash       Physical Exam:   I/O last 3 completed shifts:  In: 90 [P.O.:90]  Out: 1005 [Urine:805; Stool:200]   /55 (BP Location: Left arm)   Pulse 88   Temp 97.4  F (36.3  C) (Oral)   Resp 15   Ht 1.524 m (5')   Wt 89.5 kg (197 lb 5 oz)   SpO2 98%   BMI 38.53 kg/m       General: Alert, answering questions appropriately, appears comfortable  Edema: 2+ bilateral lower extremity edema, less volume overloaded  Respiratory: difficult to examine due to patient lying in bed, in pain and nauseated  Abdominal: soft, distended, tympanic  Neuro: Alert and oriented to self, place, and time. Follows commands. No asterixis  Psych: Appropriate  affect    Labs:   All labs reviewed by me  Electrolytes/Renal -   Recent Labs   Lab Test 08/15/20  0536 08/14/20  0439 08/13/20  0528 08/12/20  0346  08/10/20  0303 08/09/20  0316 08/08/20  0356    134 133 133   < > 137 138 139   POTASSIUM 5.5* 4.3 4.5 4.3   < > 4.6 4.3 4.3   CHLORIDE 111* 108 107 108   < > 112* 113* 111*   CO2 15* 17* 17* 16*   < > 17* 17* 18*   BUN 73* 73* 68* 65*   < > 55* 53* 52*   CR 3.62* 3.62* 3.44* 3.15*   < > 2.58* 2.53* 2.50*   * 132* 155* 125*   < > 133* 156* 200*   LUIS ALFREDO 8.2* 8.0* 8.1* 8.2*   < > 7.9* 7.6* 7.6*   MAG  --  1.8  --   --   --  1.8  --  2.1   PHOS  --   --   --  2.6  --  2.6 3.2 3.4    < > = values in this interval not displayed.       CBC -   Recent Labs   Lab Test 08/15/20  0622 08/15/20  0536 08/13/20  0528   WBC 4.4 Canceled, Test credited 6.5   HGB 7.2* Canceled, Test credited 7.6*   PLT 22* Canceled, Test credited 38*       LFTs -   Recent Labs   Lab Test 08/13/20  0528 08/08/20  0356 08/07/20  0622   ALKPHOS 100 100 120   BILITOTAL 1.5* 1.0 1.1   ALT 10 10 11   AST 8 7 8   PROTTOTAL 5.6* 5.0* 5.7*   ALBUMIN 2.8* 2.2* 2.6*       Iron Panel -   Recent Labs   Lab Test 07/29/20  0420   IRON 51   IRONSAT 47*   TAMARA 352*         Current Medications:    ipratropium  2 spray Nasal Daily     [Held by provider] lactulose  20 g Oral BID     [Held by provider] levothyroxine  75 mcg Oral QAM AC     lidocaine  1 patch Transdermal Q24H     lidocaine   Transdermal Q8H     miconazole   Topical BID     midodrine  20 mg Oral or Feeding Tube TID w/meals     [Held by provider] oxybutynin ER  15 mg Oral Daily     pantoprazole (PROTONIX) IV  40 mg Intravenous BID     [Held by provider] polyethylene glycol  17 g Oral or Feeding Tube BID     [Held by provider] pramipexole  0.125 mg Oral At Bedtime     predniSONE  5 mg Oral Daily     prochlorperazine  5 mg Intravenous Q6H     rifaximin  550 mg Oral BID     [Held by provider] sevelamer carbonate  1,600 mg Oral TID w/meals     [Held by  provider] sodium bicarbonate  1,300 mg Oral BID     sodium chloride (PF)  3 mL Intracatheter Q8H     sodium chloride (PF)  3 mL Intracatheter Q8H     sodium chloride (PF)  3 mL Intracatheter Q8H     [Held by provider] tamsulosin  0.4 mg Oral Daily     vancomycin  125 mg Oral 4x Daily     [Held by provider] vitamin D3  50 mcg Oral Daily       - MEDICATION INSTRUCTIONS -       Shannan Mendoza MD MD

## 2020-08-15 NOTE — PROVIDER NOTIFICATION
MD notified about order to pull rectal tube due to platelet levels (LL). Vanco order needing changed to enema form rather than IV bag.     MDs plan to round at bedside to discuss plan with rectal tube before pulling to assess if we should continue cont infusion of vanco or do intermittent enemas. Awaiting abdominal xray to monitor ilues.    1223: Staff paged about low pressures with MAP 59 and low temperatures. Awaiting results from xray and information regarding plan with enema/rectal tube orders.

## 2020-08-15 NOTE — PLAN OF CARE
BP 98/44   Pulse 88   Temp 97.4  F (36.3  C) (Oral)   Resp 14   Ht 1.524 m (5')   Wt 89.5 kg (197 lb 5 oz)   SpO2 100%   BMI 38.53 kg/m      Neuro: A&Ox4. Lethargic at times.  Cardiac: SR. BP running soft (MD aware). Temperatures running low (as low as 94.4). MD is aware  Respiratory: RA.  GI/: Adequate urine output via matthews. Bumex and IV bolus given. Rectal tube removed due to low platelets and increased comfort for patient.  Diet/appetite: Tolerating NPO diet. Diet advanced to clears.  Activity:  Assist of lift up to chair and in halls.  Pain: At acceptable level on current regimen. Oxy and dilaudid prn.   Skin: No new deficits noted. Redness blanchable. ecchymosis  LDA's: PIV L arm with 500 bolus at 125cc/hr of NS going.     Plan: begin to transition patient into discussion of hospice. At this time MD and patient discussed with me at bedside that patient does not want to escalate cares. Daughter in law Iraida was also involved in this conversation over the phone. Stop rectal tube, stop vanco enemas, continue lasix/bumex/po vanco. Continue supporting patient but not escalate cares.

## 2020-08-15 NOTE — PROGRESS NOTES
"SPIRITUAL HEALTH SERVICES  SPIRITUAL ASSESSMENT Progress Note (Palliative Focus)  [facility] ([place]) 6B    REFERRAL SOURCE: Palliative care follow up.    Visit with patient Analy \"Caryl\" Jast at bedside.   Caryl said, \"I'm a good [employer], but sometimes I feel like God is not listening.\" She wonders why God has not given her healing. Caryl feels isolated from God \"sometimes\" and from her family.     Caryl worries that she might need to make a decision between \"spending time with my family\" and \"continuing to fight\". She was tearful as she talked about how much she misses her family; she has asked for information about hospice because she believes it is the only way she could see family.      Caryl requested prayer for \"my family\" and \"healing\", which I offered as well as listening presence, values-based exploration of goals of care, and spiritual re-framing.    Plan: I will follow for spiritual support while Palliative Care is consulted.    Liz Kahn  Palliative   Pager [phone]  [facility] Inpatient Team Consult pager [phone] (M-F 8-4:30)  After-hours Answering Service [phone]    "

## 2020-08-15 NOTE — PLAN OF CARE
Vitally stable. BPs soft, however unchanged from previous. Lethargic overnight, arousable and A&Ox4. Rectal tube with minimal output, some leakage around. Vanco rectal infusion stopped overnight - per MAR only infuse while patient awake. Up with lift. C/o pain r/t rectal tube, educated on need and importance of tube. Up with lift. q2h turns.

## 2020-08-16 NOTE — PROVIDER NOTIFICATION
Notified MD re: platelets, creatinine and VBG results. Order rec'd for IV sodium bicarb.    Notifed MD that ordered sodium bicarb is vesicant. Current PIV is 22g in foot. Safety concern admin as ordered. MD to review with day team. Will await notification by MD

## 2020-08-16 NOTE — PROVIDER NOTIFICATION
"Time of notification: 9:20 PM  Provider notified: Maroon Night Crosscover  Patient status: increased lethargy \"fuzzy headed\" pupils R>L  Temp:  [94.4  F (34.7  C)-97.8  F (36.6  C)] 97.6  F (36.4  C)  Pulse:  [75] 75  Heart Rate:  [72-86] 76  Resp:  [12-16] 16  BP: ()/(44-56) 121/56  SpO2:  [98 %-100 %] 98 %  Orders received: Provider came to unit, assessed patient determined no intervention necessary at current time. Continue to monitor and inform provider if any further alteration in mentation or patient status    Valdo PORRAS RN    "

## 2020-08-16 NOTE — PLAN OF CARE
"Neuro: A&Ox4. Increasing lethargy throughout shift C/O increased \"fuzzy headed\"ness. Pupils noted R>L. Provider informed, instructed to continue to monitor. Other neuros intact.  Cardiac: SR. OVSS. BP 90s-100s   Respiratory: Sating 95% on RA.  GI/: Wellington in place 225 mL urine this shift. No BM this shift.  Diet/appetite: Tolerating clear liquid diet. popcicle x2  Activity:  Lift assist to chair.  Pain: C/O rectal pain. Declined medication, repositioned with some relief.  Skin: No new deficits noted. Edema in LUE  LDA's: Currently without IV access. Vascular access unable to place PIV after multiple attempts. Team discussing next option considering goals of care.    Plan: Continue with POC. Notify primary team with changes.    "

## 2020-08-16 NOTE — PROGRESS NOTES
Vascular Access Services Notes:    Lab draw per left upper forearm PIV. Lab tach was present to assist.    Time spent with pt - 15 minutes        SHYLA Mcbride, RN [facility]

## 2020-08-16 NOTE — SIGNIFICANT EVENT
SPIRITUAL HEALTH SERVICES Significant Event  [employer]  [facility] 6b    Pt anointed by Father Joon per request of patient.    Fr. Joon Gill   Pager [phone]

## 2020-08-16 NOTE — PROGRESS NOTES
Vascular Access Services Notes:    Unable to start a PIV in both upper extremities due to poor vasculature. 6B RN aware.    Time spent with pt - 30 minutes        WINNIE McbrideN, RN [facility]

## 2020-08-16 NOTE — PLAN OF CARE
/55   Pulse 75   Temp 97.5  F (36.4  C) (Oral)   Resp 16   Ht 1.524 m (5')   Wt 89.4 kg (197 lb 1.5 oz)   SpO2 95%   BMI 38.49 kg/m      Neuro: A&Ox4 answers questions appropraitely but slowly and repeats words and phrases more. Appears to have a slight tremor in upper arms  Cardiac: SR. VSS.   Respiratory: RA.  GI/: Wellington for I/o. Distended abdomen.  Diet/appetite: Tolerating clear diet. Eating well. BG done q4. Apple juice when BG is lower.   Activity:  Lift to chair and q2 turns.  Pain: oxy and dilaudid prn adding comfort care orders.   Skin: No new deficits noted. Bruises all extremities.   LDA's: L Foot PIV.     Plan: Continue with POC. Notify primary team with changes.  Transitioning to comfort cares. Social work paged.

## 2020-08-16 NOTE — PROGRESS NOTES
FABRIZIO received page from team asking SW to follow-up with patient and family regarding request to learn more about hospice facilities. FABRIZIO called patient in room who informed that this is correct, and patient wanted SW to reach out to her daughter-in-law Iraida (phone [phone]) to talk to family about facilities and next steps.     FABRIZIO called and spoke with Iraida who was appreciative of the follow-up. She stated she would like to speak with a  this week about finding a hospice facility for patient. FABRIZIO informed Iraida that they would pass this onto the weekday SW.     ALANNAH [facility]  Social Work On call     [phone]  4a, 4c, 4e, 5a, 5b Pager [phone]  6a,6b,6c,6d Pager [phone]  5c,7a,7b,7c,7d Pager [phone]     1600-midnight  Pager [phone]

## 2020-08-17 NOTE — PROGRESS NOTES
Social Work Services Progress Note    Hospital Day: 21  Date of Initial Social Work Evaluation:  7/31/20  Collaborated with:  Chart review, Massiel Benito Team; Patient's daughter-in-law, Iraida Martin ([phone])    Data:  70 year old female who was admitted on 7/28/20 and continues to have multisystem organ failure with worsening cognitive function and opted for comfort cares 8/16/20.     Request received to outreach to patient's daughter-in-law, Iraida Martin, to discuss hospice discharge planning options.     Intervention:  SWer called patient's daughter-in-law, Iraida; introduced self and explained role. Discussed request received to call Iraida to arrange hospice at time of discharge; Iraida confirmed.    SWer explained the hospice philosophy, and focus on pain management, symptom control and end of life decision making. SWer explained insurance coverage for hospice services and equipment. SWer explained that hospice is supplemental services that come to wherever she is; and support her in living out the remainder of her life, however much she has left, the way that she wants to. Iraida in agreement with hospice and would like this arranged at discharge.    Iraida stated that home with hospice services would not be an option for the patient has family is unable to provide 24/7 care. Discussed residential hospice facilities versus nursing home placement with hospice services. Due to out-of-pocket cost for residential hospice facilities, Iraida stated that nursing home placement with hospice services is the only option the patient and family would be able to pursue as the patient has Medicaid as a secondary insurance which would cover cost of room and board at a nursing home.      SW discussed LTC/nursing home choice. Provided the Medicare Compare list for SNF facilities with associated star ratings to assist with choice for referrals/discharge planning. SWer provided education regarding the star ratings and that they are  updated/maintained by Medicare and can be reviewed by visiting www.medicare.gov. Iraida requested list be emailed to her for review ([email or web address]).     FABRIZIO also discussed hospice agency choice. Provided the Medicare Compare list for hospice care to assist with choice for referrals; list emailed to Iraida.     No further questions or concerns reported at this time.  provided contact information to further assist with discharge planning needs.    Assessment:  See bedside nurse, medical team, and PT/OT notes.    Plan:    Anticipated Disposition:  Nursing home with hospice    Barriers to d/c plan:  Accepting facility; medical stability    Follow Up:  SWer will continue to be available for any discharge planning and needs.    MART Lester  7D Hematology/Oncology Social Worker  Phone: [phone]  Pager: [phone]  [email or web address]

## 2020-08-17 NOTE — PLAN OF CARE
Transfer  Transferred to: 7D  Via:bed  Reason for transfer:Pt no longer appropriate for 6B- transition to comfort cares  Family: Not yet aware of transfer  Belongings: Packed and sent with pt  Chart: Delivered with pt to next unit  Medications: Meds sent to new unit with pt  Report given to: Yoon BLACKBURN   Pt status: See end of shift note

## 2020-08-17 NOTE — DISCHARGE SUMMARY
[facility], [place]  Discharge Summary - Medicine & Pediatrics       Date of Admission:  2020  Date of Discharge:  2020  Discharging Provider: Dr Missael Hodges  Discharge Service: [place] 2    Discharge Diagnoses   Hepatic encephalopathy  Acute on chronic decompensated EtOH cirrhosis with ascites  Alcohol use disorder in remission  Delirium  C difficile colitis complicated by ileus  VRE urinary tract infection  urinary retention  Acute on chronic normocytic anemia due to blood loss and chronic disease   Esophageal varices s/p banding x5  Hypovolemic shock, resolved  Thrombocytopenia of chronic liver disease   Oliguric acute kidney injury on chronic kidney disease - now end stage   Hypothyroidism   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 F with past medical history of HFpEF, CVA, aortic stenosis and mitral stenosis, HTN, alcoholic cirrhosis (c/b portal hypertensive gastropathy, ascites, encephalopathy, esophageal varices, and bone marrow aplasia), DM type II, CKD stage IV with chronic anemia, morbid obesity, hypothyroidism, wheelchair-bound d/t CIDP, and depression who was admitted on  to the MICU after upper GIB for possible CRRT initiation due to acute on chronic kidney failure. At [facility], hospitalization was complicated by acute on chronic decompensated cirrhosis with bleeding esophageal varices (now s/p banding x5 on ), septic shock, and progressive renal failure, likely provoked by ESBL UTI.  Patient completed 72hrs of octreotide gtt post-op via RIJ (removed ), prophylactic ceftriaxone (7-day course finished ) has was continued on lactulose, rifaximin. Patient was transferred to general floor on . She had slowly improving renal  function with downtrending creatinine, she did not require CRRT/HD. She underwent paracentesis with 3L removed by IR and albumin replacement on 07/31. Had some continued oozing through GI tract but no cuba bleeding. Patient became increasingly encephalopathic as of 08/02 evening (did not receive daytime lactulose). Stroke code was called on 08/02 evening for increased lethargy and unequal pupils per nursing; Neuro felt that suspicion for stroke low, no further workup pursued. Repeat 2L paracentesis on 08/03 negative for SBP and rectal lactulose initiated. Symptomatic VRE UTI noted 08/04 so linezolid started. Patient with increased chest discomfort on 08/06, coughed up ~100mL blood on early morning of 08/07, CXR unremarkable.  Repeat para attempted 08/07 but no ascites noted, dilated loops of bowel seen. Patient then had episode of melena followed by large hematochezia episode. She became hypotensive to 80/50s. GI was paged who proceeded with urgent EGD, pRBcs given and she was transferred to the MICU for further management. She underwent emergent EGD with blood/clots in stomach but no evidence of variceal, gastric or duodenal source of bleeding.  She was managed conservatively. A R internal jugular CVC was placed for decreasing MAPs however her BP improved with 1L LR bolus and 2u pRBCs. She was hemodynamically stable and her condition improved without evidence of recurrent GI bleeding so she was transferred to [facility] 8/9 with plan for continued medical management. Her C.diff returned positive and she was started on oral vancomycin, KUB suggestive of ileus, made NPO for bowel rest. Palliative Care evaluated patient during ICU stay and patient continues to opt for aggressive medical management with goal of life prolonging measures. She continued with progressive multisystem organ failure with worsening cognitive function and opted for comfort cares 8/16 with agreement of family members. She passed comfortably during  this hospitalization on 8/24/2020. Please see associated death note for further details.     Consultations This Hospital Stay   GI HEPATOLOGY ADULT IP CONSULT  NEPHROLOGY ICU IP CONSULT  VASCULAR ACCESS CARE ADULT IP CONSULT  PHYSICAL THERAPY ADULT IP CONSULT  OCCUPATIONAL THERAPY ADULT IP CONSULT  MEDICATION HISTORY IP PHARMACY CONSULT  INTERVENTIONAL RADIOLOGY ADULT/PEDS IP CONSULT  VASCULAR ACCESS CARE ADULT IP CONSULT  VASCULAR ACCESS CARE ADULT IP CONSULT  VASCULAR ACCESS CARE ADULT IP CONSULT  INTERVENTIONAL RADIOLOGY ADULT/PEDS IP CONSULT  VASCULAR ACCESS CARE ADULT IP CONSULT  VASCULAR ACCESS CARE ADULT IP CONSULT  INTERVENTIONAL RADIOLOGY ADULT/PEDS IP CONSULT  SWALLOW EVAL SPEECH PATH AT BEDSIDE IP CONSULT  PALLIATIVE CARE ADULT IP CONSULT  VASCULAR ACCESS CARE ADULT IP CONSULT  PSYCHOLOGY ADULT IP CONSULT  VASCULAR ACCESS CARE ADULT IP CONSULT  NEPHROLOGY GENERAL ADULT IP CONSULT  VASCULAR ACCESS CARE ADULT IP CONSULT  VASCULAR ACCESS CARE ADULT IP CONSULT  INTERVENTIONAL RADIOLOGY ADULT/PEDS IP CONSULT  SPIRITUAL HEALTH SERVICES IP CONSULT  PALLIATIVE CARE ADULT IP CONSULT  VASCULAR ACCESS CARE ADULT IP CONSULT  VASCULAR ACCESS CARE ADULT IP CONSULT  VASCULAR ACCESS CARE ADULT IP CONSULT  SOCIAL WORK IP CONSULT  INTERVENTIONAL RADIOLOGY ADULT/PEDS IP CONSULT    Code Status   No CPR- Do NOT Intubate     The patient was discussed with Dr. Missael Chapman MD  [place] [id number] Service  [facility]  Pager: [phone]  ______________________________________________________________________    Physical Exam   At time of examination patient unresponsive to verbal and tactile stimuli, breath sounds absent, unresponsive to noxious stimuli, heart sounds absent, pupillary light and corneal reflexes absent.        Primary Care Physician   CELINE DAVISON    Significant Results and Procedures   Most Recent 3 CBC's:  Recent Labs   Lab Test 08/16/20  8713  08/15/20  0622 08/15/20  0536 08/13/20  0528   WBC  --  4.4 Canceled, Test credited 6.5   HGB  --  7.2* Canceled, Test credited 7.6*   MCV  --  103* Canceled, Test credited 98   PLT 30* 22* Canceled, Test credited 38*     Most Recent 3 BMP's:  Recent Labs   Lab Test 08/16/20  0358 08/15/20  2037 08/15/20  0536    138 139   POTASSIUM 4.1 4.0 5.5*   CHLORIDE 110* 110* 111*   CO2 17* 16* 15*   BUN 71* 69* 73*   CR 3.46* 3.31* 3.62*   ANIONGAP 10 12 12   LUIS ALFREDO 8.4* 7.8* 8.2*   GLC 87 91 107*     Most Recent 2 LFT's:  Recent Labs   Lab Test 08/13/20 0528 08/08/20 0356   AST 8 7   ALT 10 10   ALKPHOS 100 100   BILITOTAL 1.5* 1.0   ,   Results for orders placed or performed during the hospital encounter of 07/28/20   US Abd Complete w Abd/Pel Duplex Complete Port    Narrative    EXAMINATION: US ABDOMEN COMPLETE WITH DOPPLER COMPLETE PORTABLE  7/29/2020 8:58 AM     COMPARISON: Outside hospital report CT abdomen and pelvis 8/16/2019.    HISTORY: Decompensated cirrhosis, ascites, esophageal varices,  hepatorenal syndrome.    TECHNIQUE: The abdomen was scanned in standard fashion with  specialized ultrasound transducer(s) using both gray-scale, color  Doppler, and spectral flow techniques.    Findings:  Liver: The liver demonstrates coarse heterogenous echotexture and  increased parenchymal echogenicity. Nodular liver contours. No  evidence of a focal hepatic mass.     Extrahepatic portal vein flow is antegrade at 10 cm/s.  Right portal vein flow is antegrade, measuring 6 cm/s.  Left portal vein flow is antegrade, measuring 11 cm/s.    Flow in the hepatic artery is biphasic:  Peak systolic velocity 55 cm/s.  Unable to calculate resistive index.    Recannulized paraumbilical vein with a velocity of 15 cm/s.    The splenic vein is patent and flow is towards the liver.  The left,  middle, and right hepatic veins are patent with flow towards the IVC.  The IVC is patent with flow towards the heart.   The aorta is patent  with  normal caliber.    Gallbladder: Diffuse thickening of the gallbladder wall which may be  due to adenomyomatosis versus third spacing of fluid/cirrhosis.  Echogenic foci with reverberation artifact suggests cholesterol  deposits. Multiple nonobstructing gallstones present with biliary  sludge.    Bile Ducts: The common bile duct was not visualized.    Pancreas: Visualized portions of the head and body of the pancreas are  unremarkable.     Kidneys:  The left kidney is of normal echotexture without mass or  hydronephrosis and appears mildly atrophic. The right kidney is not  well visualized but appears atrophic consistent with medical renal  disease. Renal lengths: right- 8.4 cm, left- 8.8 cm.    Spleen: The spleen is enlarged measuring 20.7 cm in length.    Fluid: There is moderate ascites present.      Impression    Impression:   1.  Cirrhotic liver morphology with evidence of portal hypertension.  Reduced portal vein velocities consistent with portal venous  hypertension.  2.  Splenomegaly.  3.  Ascites.  4.  Diffuse gallbladder wall thickening which may be due to  adenomyomatosis versus underlying liver disease. Cholesterolosis  likely present.    I have personally reviewed the examination and initial interpretation  and I agree with the findings.    LISA FREDERICK MD   IR Paracentesis    Narrative    Procedure date: 7/31/2020:    1. Ultrasound guided diagnostic and therapeutic paracentesis.    Preop diagnosis: Ascites.    Postop diagnosis: Same.    Proceduralist: Yogi Aleman PA-C    Assist: KAIN Gordon.    Medications: No intravenous sedation was administered. 1% lidocaine,  10 cc.    Nursing: The patient was placed on continuous monitoring by unit  nursing staff under the supervision of the attending physician. The  patient remained stable throughout the procedure.    PROCEDURE: The patient understood the limitations, alternatives, and  risks of the procedure and requested the procedure be  performed. Both  written and oral consent were obtained.    The patient was identified by name and date of birth, and placed in  the recumbent position. The left lower quadrant was prepped and draped  in the usual sterile fashion. Ultrasound evaluation revealed a large  fluid collection at this location, and an image was saved. The site  overlying the fluid was anesthetized with 1% lidocaine. Under  ultrasound guidance, a 5 Fijian, 10 cm straight Yueh catheter was  advanced into the abdominal space, and an image was saved. Syringe  aspiration revealed initial return of hazy milan fluid. 120 cc hazy  milan fluid. Catheter to vacuum drainage, with an additional 2880 cc  ascites aspirated. Repeat ultrasound revealed significant ascites  remaining. Procedure terminated due to aspiration limit. Catheter  removed. Sterile dressing applied. Images were saved throughout the  procedure. Procedure was well tolerated, with no immediate  complications.    Estimate blood loss: Less than 1 cc.    Specimens: 120 cc hazy milan ascites.      Impression    IMPRESSION: Ultrasound guided diagnostic and therapeutic paracentesis.  Total of  3000 cc of hazy milan ascites aspirated.    PLAN: Followup per primary team.    Attestation: The physician assistant (PA) who performed this procedure  and signed the above report is licensed to practice in the [facility] pursuant to [place] Statute 147A.09.  This includes meeting the  Statute and [place] Board of Medical Practice requirement of an  active Delegation Agreement, which documents delegation of services by  primary and alternate supervising physicians. All services rendered  are performed under a collaborative agreement with Dr. Mitchel Edmonds, Director of Interventional Radiology, [facility].    JAELYN VIRAMONTES PA-C   IR Paracentesis    Narrative    DIAGNOSIS: Alcoholic cirrhosis    PROCEDURE: IR PARACENTESIS    Impression    IMPRESSION: Completed  ultrasound-guided diagnostic and therapeutic  paracentesis. A total of 2000 mL clear pink fluid aspirated from the  abdomen. A sample of fluid sent to lab for analysis as requested.  Drainage stopped due to inpatient medicine drain limit, moderate  ascites remains. No immediate complication.      ----------    CLINICAL HISTORY: Patient with decompensated alcoholic cirrhosis and  concern for bacterial peritonitis so IR has been consulted for  diagnostic and therapeutic paracentesis with a drain limit of 2 L due  to kidney function.    PERFORMED BY: Pedro Knight PA-C    CONSENT: Written informed consent was obtained over the phone with the  patient's daughter-in-law and is documented in the patient record.    MEDICATIONS: 5 mL 1% lidocaine for local anesthesia.     DESCRIPTION: Ascites was identified on limited abdominal ultrasound  exam and picture is documented in the patient's record. The abdomen  was prepped and draped in the usual sterile fashion.  Color ultrasound  was used to assess the subcutaneous tissues. No vessels were  identified in the region of planned puncture. Local anesthesia was  achieved. Under ultrasound guidance, a 5-Hungarian pigtail centesis  needle/catheter was advanced into the peritoneal space and needle was  removed.  After sample was collected, the catheter was connected to  drainage and ascites was aspirated. Catheter was removed on completion  of drainage and sterile dressing was applied.    COMPLICATIONS: No immediate concerns, the patient remained stable  throughout the procedure and tolerated it well.    ESTIMATED BLOOD LOSS: Minimal    SPECIMENS: 60 mL ascites    DIETER KNIGHT PA-C   XR Chest Port 1 View    Narrative    EXAMINATION: XR CHEST PORT 1 VW, 8/6/2020 10:47 PM    INDICATION: Coughing up blood; hx of esophageal varices    COMPARISON: None    FINDINGS: 2 views of the chest. Trachea is midline. The  cardiomediastinal silhouette is within normal limits. No pleural  effusion. No  pneumothorax.  Left basilar atelectasis. No pneumothorax.  Linear sclerotic foci overlying the posterior right rib 2. Destructive  and sclerotic changes of the left humeral head. Partially visualized  upper abdomen is within normal limits.      Impression    IMPRESSION:  1. Linear atelectasis in the left lung base.  2. Benign-appearing sclerotic change of posterior right rib 2 and  destructive and sclerotic changes of the left humeral head.    I have personally reviewed the examination and initial interpretation  and I agree with the findings.    JOHN HERNANDEZ MD   IR Follow Up Visit Inpatient    Narrative    PRE-PROCEDURE DIAGNOSIS: Ascites    POST-PROCEDURE DIAGNOSIS: Same    PROCEDURE: Limited abdominal ultrasound    Impression    IMPRESSION: Ultrasound-guided paracentesis requested for ascites.  Limited ultrasound scan of abdomen revealed trace ascites.  Paracentesis not completed due to increased risk for complication.      ----------    PERFORMED BY: GINA Hobbs PA-C   XR Abdomen Port 1 View    Narrative    Exam: XR ABDOMEN PORT 1 VW, 8/7/2020 2:21 PM    Indication: gi bleed, ab pain    Comparison: Chest radiograph 6/20/2020. Ultrasound 7/29/2020.    Findings:   Two supine views of the lower pelvis and one supine view of the  abdomen. Distended and gas-filled stomach, small bowel, and large  bowel. Multiple calcified and tortuous arteries throughout the abdomen  and upper thighs. The lung bases are visualized.      Impression    Impression:   Gas-filled distention of stomach, small bowel, and large bowel.  Favoring ileus. If there is clinical concern for obstruction follow-up  radiographs can be obtained.    I have personally reviewed the examination and initial interpretation  and I agree with the findings.    LISA FREDERICK MD   XR Chest Port 1 View    Narrative    EXAMINATION: XR CHEST PORT 1 VW, 8/7/2020 6:38 PM    INDICATION: confirm CVC placement    COMPARISON:  8/6/2020    FINDINGS: Single portable AP radiograph of the chest. Right IJ central  venous catheter with tip terminating over the low SVC. Trachea is  midline. The cardiomediastinal silhouette is stable. Small, right  greater than left pleural effusions. No pneumothorax. Bilateral  interstitial and airspace opacities. Partially visualized upper  abdomen is unremarkable. Benign-appearing sclerotic change of the  fifth posterior right rib apparent sclerotic change of posterior right  rib  2. destructive changes of the shoulders bilaterally.      Impression    IMPRESSION:  1. Right IJ central venous catheter with tip terminating over the low  SVC.  2. Mild pulmonary edema. Bibasilar atelectasis.  3. Small right pleural effusion.    I have personally reviewed the examination and initial interpretation  and I agree with the findings.    JOHN HERNANDEZ MD   XR Abdomen Port 1 View    Narrative    XR ABDOMEN PORT 1 VW on 8/9/2020 8:00 PM.    INDICATION: increasing pain.    COMPARISON: Radiograph dated 8/7/2020    FINDINGS:   Portable AP supine radiographs of the abdomen. Gas distended large and  small bowel loops are minimally decreased. No pneumatosis or portal  venous gas. Small right pleural effusion.      Impression    IMPRESSION:   1. Slightly decreased gaseous distention of large and small bowel but  bowel loops which likely represent ileus.  2. Small right pleural effusion.    I have personally reviewed the examination and initial interpretation  and I agree with the findings.    JOHN HERNANDEZ MD   XR Abdomen Port 1 View    Narrative    Exam: XR ABDOMEN PORT 1 VW, 8/12/2020 11:35 AM    Indication: ileus/SBO    Comparison: 8/9/2020    Findings:   Supine frontal views of the abdomen. Diffusely air distended small and  large bowel, similar to prior. No appreciable pneumatosis or portal  venous gas. Vascular calcifications. The lung bases are clear. No  acute osseous normality.      Impression    Impression:  Stable diffusely air distended small and large bowel,  compatible with ongoing adynamic ileus    HAIM COSME, DO   US Abdomen Limited    Narrative    EXAMINATION: US ABDOMEN LIMITED, 8/12/2020 10:06 AM     COMPARISON: Ascites evaluation for paracentesis 8/7/2020.    HISTORY: Evaluate for ascites pockets amenable to paracentesis.    TECHNIQUE: Gray scale ultrasound of the abdomen.    FINDINGS:  A targeted ultrasound of all 4 abdominal quadrants was performed and  demonstrated moderate ascites throughout the abdomen and pelvis.  Largest pocket demonstrated in the right lower quadrant. Ascites has  increased from prior ultrasound.    Mild gallbladder wall thickening likely due to underlying ascites.  Spleen appears enlarged although not entirely included, at least 17 cm  craniocaudad. Liver appears heterogeneous, again not entirely  included.        Impression    IMPRESSION:  1.  Ascites throughout the abdomen and pelvis, increased from prior  paracentesis assessment exam.  2.  Heterogeneous liver presumably reflects the cirrhosis history as  per the electronic medical record.  3.  Splenomegaly suspected although not entirely included, likely  portal hypertension related.    LISA FREDERICK MD   IR Paracentesis    Narrative    PROCEDURES 8/13/2020:  1. Ultrasound guided paracentesis.    CLINICAL HISTORY: 70-year-old female with a composite alcoholic  cirrhosis with ascites. Request received for paracentesis.    Comparisons: Ultrasound abdomen 8/12/2020.    Staff Radiologist: Dinh Mcintosh MD    Medications: The patient was placed on continuous monitoring. No  intravenous sedation was administered. The patient remained stable  throughout the procedure.    PROCEDURE: The patient understood the limitations, alternatives, and  risks of the procedure and requested the procedure be performed. Both  written and oral consent were obtained.    Right lower quadrant was prepped and draped in the usual sterile  fashion. 1%  lidocaine without epinephrine was used for local  anesthesia. Under ultrasound guidance, a 5 [employer]  centesis needle catheter was advanced into the peritoneal space. Image  documenting catheter position and ascites was entered into the  patient's permanent record.    Catheter was connected to vacuum drainage. 2000 mL ascites aspirated.  Catheter removed. Sterile dressing applied. No immediate complication.      Impression    IMPRESSION:   Ultrasound guided paracentesis. 2000 mL ascites aspirated.   XR Abdomen Port 1 View    Narrative    Exam: XR ABDOMEN PORT 1 VW, 8/15/2020 11:19 AM    Indication: prolonged ileus    Comparison: Abdomen 8/12/2020    Findings:   Portable supine abdomen. Gaseous distention of bowel throughout the  abdomen demonstrated. This appears to be likely small and large bowel  with small bowel in the left abdomen measuring over 6.2 cm. Large  bowel is difficult to measure. Gas present to the rectum. Limited  evaluation for free air on supine imaging. Vascular calcifications.  Degenerative changes.      Impression    Impression: Ongoing bowel distention redemonstrated suggesting ongoing  ileus.    LISA FREDERICK MD       Discharge Medications   Current Discharge Medication List      CONTINUE these medications which have NOT CHANGED    Details   acetaminophen (TYLENOL) 500 MG tablet Take 1,000 mg by mouth every 8 hours as needed       allopurinol (ZYLOPRIM) 300 MG tablet Take 300 mg by mouth daily      !! calcitRIOL (ROCALTROL) 0.25 MCG capsule Take 0.25 mcg by mouth every Sunday, Tuesday, Thursday, and Saturday.      !! calcitRIOL (ROCALTROL) 0.5 MCG capsule Take 0.5 mcg by mouth Take 0.5 mcg by mouth every Monday, Wednesday, and Friday.      ferrous fumarate 65 mg, [place]. FE,-Vitamin C 125 mg (VITRON-C)  MG TABS tablet Take 1 tablet by mouth daily      gabapentin (NEURONTIN) 300 MG capsule Take 300 mg by mouth At Bedtime      guaiFENesin (ROBITUSSIN) 100 MG/5ML SYRP  Take 10 mLs by mouth every 4 hours as needed for cough       insulin aspart (NOVOLOG FLEXPEN) 100 UNIT/ML pen Inject 2 Units Subcutaneous 3 times daily (before meals)      insulin glargine (LANTUS PEN) 100 UNIT/ML pen Inject 14 Units Subcutaneous At Bedtime    Comments: If Lantus is not covered by insurance, may substitute Basaglar at same dose and frequency.        ipratropium (ATROVENT) 0.06 % nasal spray Spray 2 sprays in nostril daily      iron polysaccharides (NU-IRON) 150 MG capsule Take 150 mg by mouth daily (with dinner)      levothyroxine (SYNTHROID/LEVOTHROID) 75 MCG tablet Take 75 mcg by mouth daily      melatonin (MELATONIN MAXIMUM STRENGTH) 5 MG tablet Take 5 mg by mouth At Bedtime      omeprazole (PRILOSEC) 20 MG DR capsule Take 20 mg by mouth 2 times daily (before meals)      oxybutynin ER (DITROPAN XL) 15 MG 24 hr tablet Take 15 mg by mouth daily      polyethylene glycol (MIRALAX) 17 g packet Take 1 packet by mouth every other day      pramipexole (MIRAPEX) 0.125 MG tablet Take 0.125 mg by mouth At Bedtime      predniSONE (DELTASONE) 5 MG tablet Take 5 mg by mouth daily      sevelamer carbonate (RENVELA) 800 MG tablet Take 1,600 mg by mouth 3 times daily (with meals)      Skin Protectants, Misc. (EUCERIN) cream Apply topically At Bedtime      sodium bicarbonate 650 MG tablet Take 1,300 mg by mouth 2 times daily      tamsulosin (FLOMAX) 0.4 MG capsule Take 0.4 mg by mouth daily      vitamin D3 (CHOLECALCIFEROL) 50 mcg (2000 units) tablet Take 1 tablet by mouth daily      nadolol (CORGARD) 20 MG tablet Take 20 mg by mouth At Bedtime      potassium chloride ER (KLOR-CON M) 20 MEQ CR tablet Take 20 mEq by mouth 2 times daily      torsemide (DEMADEX) 20 MG tablet Take 60 mg by mouth 2 times daily      traMADol (ULTRAM) 50 MG tablet Take 50 mg by mouth every 6 hours as needed       !! - Potential duplicate medications found. Please discuss with provider.        Allergies   No Known Allergies

## 2020-08-17 NOTE — PLAN OF CARE
Neuro: A&Ox4. Increasing somnolence and confusion.  Cardiac: Comfort cares  Respiratory: Comfort cares  GI/: Adequate urine output. BM X1  Diet/appetite: Full liquid diet. C/O stomach pain after eating advised to eat small amounts for taste only  Activity:  Bedfast  Pain: Increasing abdominal pain Oxy x1 with partial relief  Skin: No new deficits noted.  LDA's: PIV 1x L foot    Plan: Transitioned to comfort cares. Notify primary team with changes.

## 2020-08-17 NOTE — PROGRESS NOTES
Transfer  D: Reason for transfer to was the patient was transitioned to comfort cares. Condition of patient prior to transfer was acute care.  I: Report received from BERE Lyle. [facility] 7D [facility]. Family was not present. Chart and medications sent with patient. Belongings with the patient. Teaching as documented in electronic flowsheet.  A: Patient denied pain when arrived on the unit. Was A&Ox4, but somnolent.   P: Will continue to monitor pain and comfort and intervene accordingly.

## 2020-08-17 NOTE — PLAN OF CARE
OT/7D - Cancel. Per chart review and discussion with RN, pt has transitioned to comfort cares. Will complete therapy orders at this time.     Occupational Therapy Discharge Summary    Reason for therapy discharge:    No further expectations of functional progress.    Progress towards therapy goal(s). See goals on Care Plan in [facility] electronic health record for goal details.  Goals partially met.  Barriers to achieving goals:   transition to comfort cares.    Therapy recommendation(s):    No further therapy is recommended.       Airway patent, Nasal mucosa clear. Mouth with normal mucosa.

## 2020-08-17 NOTE — PLAN OF CARE
Patient lethargic,open eyes to voice ,answered yes no questions.Oriented to self,disoriented to place ,time and situation.Appetite very poor,pt is a total feed.Denied pain.Wellington can had patent output scanty,rectal pouch patent,watery stool.Patient is turned and repositioned every 2hr..Patient waiting for hospice care placement.Continue per plan of care.

## 2020-08-17 NOTE — PROGRESS NOTES
Patient has been transitioned to comfort care   Nephrology will sign off . Please call with any questions    Discussed with primary team    Patient staffed with Dr Bhumi Campbell MD, FACP  Nephrology Fellow   [facility]   Pager [phone]

## 2020-08-17 NOTE — PLAN OF CARE
Patient continuing POC with comfort cares. A&O x4; somnolent. C/o pain to abdomen and bilateral knees; given PO oxycodone x1. Denied nausea. Regular respiratory pattern. PIV right foot; saline locked. Patient is C. Diff positive; enteric precautions maintained. 2 watery BM overnight; rectal pouch placed. Wellington in place; catheter cares done. Clear liquid diet for patient per notes. Turned ~q2h; bedfast; assist of 2 to turn. Slept between cares.

## 2020-08-17 NOTE — PROGRESS NOTES
"SPIRITUAL HEALTH SERVICES  SPIRITUAL ASSESSMENT Progress Note (Palliative Focus)  [facility] ([place] 7D    REFERRAL SOURCE: Palliative care follow up.    Visit with patient Analy \"Caryl\" Jast at bedside. Caryl was sleepy, but able to say \"yes\" when I asked her if she'd like prayer in the [employer]al tradition, as she had wanted on my last visit. She said a few words of the Lord's Prayer (Pentecostal) and \"Amen.\"      Plan: I will follow for spiritual support while Palliative Care is consulted.    Liz Kahn  Palliative   Pager [phone]  [facility] Inpatient Team Consult pager [phone] (M-F 8-4:30)  After-hours Answering Service [phone]    "

## 2020-08-17 NOTE — PLAN OF CARE
Physical Therapy Discharge Summary    Reason for therapy discharge:    Pt is now on comfort cares.    Progress towards therapy goal(s). See goals on Care Plan in [facility] electronic health record for goal details.  Pt is now on comfort cares.    Therapy recommendation(s):    No further therapy is recommended.

## 2020-08-17 NOTE — PROGRESS NOTES
[facility]  Palliative Care Daily Progress Note       Recommendations & Counseling       Agree with current medication management on comfort care, pt appears comfortable with current measures   In addition to current medications, at discharge recommend including the following standard hospice PRN orders:  - Bisacodyl 10 mg Suppository AR daily to bid prn constipation  - Tylenol 650 mg suppository PO/AR q4hr prn pain, fever  - Lorazepam Solution 0.5-1.0 mg PO/SL/AR q4h prn anxiety/restlessness  - Atropine sulfate 1% opth solution 1-2 drops SL q2h prn secretions  - Senna 8.6 mg 1-2 tabs PO prn constipation  - Haldol 1-2 mg PO/SL/AR q6h prn nausea, agitation or delirium.   - oxycodone tab 2.5-5mg q 2 hours prn ; anticipate she will need sublingual solution in near future and if by time she discharges if she is needing it then would send with concentrated oxycodone 20mg/mL  2.5-5mg q 2 hours prn pain or dysnpea. (dilaudid would be a fine option as well but our inpatient pharmacy does not stock concentrated hydromorphone so would need to be compounded)    Unit SW assisting with facility discharge    Palliative  assisting with spiritual support      Total time spent was 20 minutes,  >50% of time was spent counseling and/or coordination of care regarding comfort care symptom management and hospice dispo planning.    Tamera Ruiz MD / Palliative Medicine / Pager [phone] / [facility] Inpatient Team Consult Pager [phone] (answered 8am-430pm M-F) - ok to text page via Location Labs / After-Hours Answering Service [phone] / Palliative Clinic in the [facility] at the [facility] - [phone] (scheduling); [phone] (triage).            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 Currently there is concern for worsening renal function and poor nutrition with high risk of TPN due to liver dysfunction and MARCELO with difficult fluid status.   Transitioned to comfort care this weekend, planning for facility discharge on hospice     Today, the patient was seen for:  Goals of care   Patient support  ESLD  MARCELO  Ileus with poor nutrition    Prognosis, Goals, or Advance Care Planning was addressed today with: No.  Mood, coping, and/or meaning in the context of serious illness were addressed today: No.  Summary/Comments:            Interval History:     Chart review/discussion with unit or clinical team members:   transitioned to comfort measures only yesterday    Per patient or family/caregivers today:  Pt was sleeping, did not wake her    Key Palliative Symptoms:  We are not helping to manage these symptoms currently in this patient.    Patient is on opioids: bowels not assessed today.           Review of Systems:     Not assessed - pt was sleeping, on comfort care          Medications:     I have reviewed this patient's medication profile and medications during this hospitalization.    Noted meds:    bumex 2mg daily  atrovent nasal bid  lidcaine patch  Midodrine tid  Pantoprazole daily  Rifaximin daily  Sodium bicarb  Vancomycin oral qid    Prn meds:  Tylenol 500mg q 6 hours prn   biotine  Hydroxyzine 10mg tid prn   zyprexa 2.5-5 mg q 6 hours prn agitation, delirium or nausea--none  Ondansetron 4mg q 6 hours prn nausea- none  Oxycodone 2.5mg q 6 hours prn pain or SOB- x3 yesterday, x 1 overnight  simethicone             Physical Exam:   Vitals were reviewed  Temp: 97.5  F (36.4  C) Temp src: Oral BP: 112/55   Heart Rate: 90 Resp: 16  SpO2: 95 % O2 Device: None (Room air)    Gen: sitting/lying in bed. Appears comfortable , sleeping  HEENT: NCAT. Eyes closed  CV: RRR , Peripheral perfusion intact.   Resp: unlabored work of breathing, on room air  Abd: soft, nt, nd, +BS   Msk: no gross deformity, + sarcopenia  Skin:  mild jaundice  Ext: warm, well perfused.   Neuro/mental status: sleeping, did not wake her                Data Reviewed:     Reviewed recent pertinent imaging, comments:   8/15 xray abdomen:                                                          Impression: Ongoing bowel distention redemonstrated suggesting ongoing  ileus.    Reviewed recent labs, comments:   vt 3.46, GFR 13, bicarb 17, bun 71      Liver Function Studies -   Recent Labs   Lab Test 08/13/20  0528   PROTTOTAL 5.6*   ALBUMIN 2.8*   BILITOTAL 1.5*   ALKPHOS 100   AST 8   ALT 10

## 2020-08-17 NOTE — PROGRESS NOTES
[facility], [place]    Acceptance Note - Massiel 2 Service       Date of Admission:  7/28/2020    Assessment & Plan   Caryl Martin is a 70 y.o. F with past medical history of HFpEF, CVA, aortic stenosis and mitral stenosis, HTN, alcoholic cirrhosis (c/b portal hypertensive gastropathy, ascites, encephalopathy, esophageal varices, and bone marrow aplasia), DM type II, CKD stage IV with chronic anemia, morbid obesity, hypothyroidism, wheelchair-bound d/t CIDP, and depression who is transferred from the MICU 8/9 after hemodynamic stabilization for GI bleed and hypotension. She continues to multisystem organ failure with worsening cognitive function and opted for comfort cares 8/16.    For full details of admission, please see interim summary dated 8/10/2020.    Updates today:   - COMFORT CARES after care conference today  - plan for discharge to facillity + hospice early this week  - will continue PO meds to prevent clinical worsening (HE ppx, bumex for uremia, ppi for gi bleed)    # comfort care  - palliative consulted, appreciate recs  - Pain: oxycodone, lidocain patches, capsaicin, acetaminophen  - diet for comfort  - artificial tears, saliva, atroprine for secretions  - melatonin  - zofran PRN nausea  - lorazepam, haldol PRN delirium/anxiety  - senna + bisacodyl bowel reg PRN  -  services  - SW assisting with facility transfer    #Oliguric MARCELO on CKD stage IV  # metabolic acidosis  - midodrine 20 mg TID  - sodium bicarb BID  - matthews for comfort    # Hepatic encephalopathy  # Acute on chronic decompensated EtOH cirrhosis with ascites  # Alcohol use disorder in remission  # Esophageal varices s/p banding x5  # Thrombocytopenia of chronic liver disease   >Ascites:  may need comfort volodymyr   >HE: Continue rifaxamin  >Diet:  CLD  >Varices:  PO pantoprazole 40 mg BID    # C.Diff + ileus  # ileus  - simethicone prn  - vancomycin PO QID (8/7- 8/28)     #Intertrigo - Micatin powder PRN      Diet: CLD  Fluids: PO  Lines: n/a  DVT Prophylaxis: none  Wellington Catheter: in place, indication: comfort  Code Status: DNR/DNI       Disposition Plan   Expected discharge: 1-2 days to facility + hospice for comfort cares.   Entered: Chava Azevedo MD 08/17/2020, 3:41 PM     The patient's care was discussed with the Attending Physician, Dr. Kim.    Chava Azevedo MD  [place] [address] Service  Internal Medicine, PGY-1  [facility]  Pager: [phone]    ______________________________________________________________________    Interval History   Patient is drowsy and confused today, intermittent garbled speech,  Breakfast at bedside but untouched. Patient complains of intermittent back and belly pain. She mainly states that she is sad and depressed today about the reality of her situation. She is unable to say much else and appeared dazed for most of the visit. Reassurance and emotional support provided.      Data reviewed today: I reviewed all medications, new labs and imaging results over the last 24 hours. I personally reviewed new labs.    Physical Exam   Vital Signs:                    Weight: 197 lbs 1.46 oz  General: arousable, answering few questions appropriately, delayed response, sad/depressed, garbled speech intermittently

## 2020-08-17 NOTE — PLAN OF CARE
3138-5159. Pt continue on comfort care. Lethargic, can answer yes or no questions by nodding her head. Refused dinner. Wellington cath patent with small urine output. Recatal tube in place for diarrhea. Side to side repositioning q 2 hours. Denied pain. Waiting institutional hospice care placement.

## 2020-08-18 NOTE — PROVIDER NOTIFICATION
Paged crosscover: pt inconsistent following commands, unable to swallow now so unable to give PO Vanco or Rifaxamin. Able to order sublingual pain med options for comfort cares?

## 2020-08-18 NOTE — PROGRESS NOTES
CLINICAL NUTRITION SERVICES - BRIEF NOTE    Per chart review, noted pt now on comfort cares as of 8/16. Due to change in pt's status, no further nutrition intervention planned at this time. RD to sign off.    Elvia Esposito RD,LD  7D pager [phone]

## 2020-08-18 NOTE — PROGRESS NOTES
[facility], [place]    Acceptance Note - Massiel 2 Service       Date of Admission:  7/28/2020    Assessment & Plan   Caryl Martin is a 70 y.o. F with past medical history of HFpEF, CVA, aortic stenosis and mitral stenosis, HTN, alcoholic cirrhosis (c/b portal hypertensive gastropathy, ascites, encephalopathy, esophageal varices, and bone marrow aplasia), DM type II, CKD stage IV with chronic anemia, morbid obesity, hypothyroidism, wheelchair-bound d/t CIDP, and depression who is transferred from the MICU 8/9 after hemodynamic stabilization for GI bleed and hypotension. She continues to multisystem organ failure with worsening cognitive function and opted for comfort cares 8/16.    For full details of admission, please see interim summary dated 8/10/2020.    Updates today:   - COMFORT CARES after care conference today  - plan for discharge to facillity + hospice early this week  - will continue PO meds to prevent clinical worsening - rifaxamin for HE and protonix for bleeding  - stopped PO vancomycin after 10 day course - if diarrhea worsens consider restarting for patient comfort      # comfort care  - palliative consulted, appreciate recs  - Pain: oxycodone, lidocain patches, capsaicin, acetaminophen  - diet for comfort  - artificial tears, saliva, atroprine for secretions  - melatonin  - zofran PRN nausea  - lorazepam, haldol PRN delirium/anxiety  - senna + bisacodyl bowel reg PRN  -  services  -  assisting with facility transfer    #Oliguric MARCELO on CKD stage IV  # metabolic acidosis  - discontinue midodrine 20 mg TID and sodium bicarb BID  - matthews for comfort    # Hepatic encephalopathy  # Acute on chronic decompensated EtOH cirrhosis with ascites  # Alcohol use disorder in remission  # Esophageal varices s/p banding x5  # Thrombocytopenia of chronic liver disease   >Ascites:  may need comfort volodymyr   >HE: Continue rifaxamin  >Diet:  CLD    # C.Diff + ileus  # ileus  -  simethicone prn  - vancomycin PO QID (8/7- 8/18) - if profuse diarrhea could restart for comfort    #Intertrigo - Micatin powder PRN     Diet: CLD  Fluids: PO  Lines: PIV  DVT Prophylaxis: none  Wellington Catheter: in place, indication: comfort  Code Status: DNR/DNI       Disposition Plan   Expected discharge: 1-2 days to facility + hospice for comfort cares.   Entered: iSobhan Espinoza MD 08/18/2020, 11:03 AM     The patient's care was discussed with the Attending Physician, Dr. Hodges.      Siobhan Espinoza MD  Internal Medicine & Pediatrics PGY4  Pager: [phone]      ______________________________________________________________________    Interval History   Patient is drowsy and confused today, intermittent garbled speech. Ate a little bit of food yesterday. Received IV pain medication overnight due to one time difficulty swallowing. This has not continued to be an issue. Patient restful this morning, awake to voice and answers questions in single words mostly.    Data reviewed today: I reviewed all medications, new labs and imaging results over the last 24 hours. I personally reviewed new labs.    Physical Exam   Vital Signs:                    Weight: 197 lbs 1.46 oz  General: arousable, answering few questions appropriately, delayed response, says she feels comfortable, garbled speech intermittently   Abdomen: distended without pain

## 2020-08-18 NOTE — PLAN OF CARE
4400-4458: Continuing comfort cares. Disoriented to time, place, and situation. Inconsistently follows commands. Patient moans/cries out with turning; given 0.2 mg IV Dilaudid x1. Able to shake head yes or no to communicate; very little verbal communication. Pt now unable to safely swallow pills/liquids. PIV right foot; saline locked. Turned q2h. Fecal pouch removed d/t leaking; currently out of stock with sterile stores; patient continues to have watery stools. Wellington catheter in place; some urine output.

## 2020-08-18 NOTE — PLAN OF CARE
5794-0115:    Continues on comfort cares. Disoriented x 4. Intermittently responds to voice/touch. Unable to follow most commands, however did squeeze hand to indicate pain. 2.5mg PO oxycodone solution given x 1. Patient seems to have an easier time swallowing liquid medication vs pills. IV dilaudid discontinued. Saline locked PIV in right foot. Briefs changed approximately q2h d/t stool incontinence. Wellington patent, minimal output. Repositioned as needed based on patient's comfort level. Sons at bedside this evening. No acute events. Continue to monitor & prioritize comfort.

## 2020-08-18 NOTE — PROGRESS NOTES
Social Work Services Progress Note    Hospital Day: 22  Date of Initial Social Work Evaluation:  7/31/20  Collaborated with:  Massiel Benito Team; Patient's daughter-in-lawIraida    Data:  70 year old female who was admitted on 7/28/20 and continues to have multisystem organ failure with worsening cognitive function and opted for comfort cares 8/16/20.     Intervention:   called patient's daughter-in-lawIraida. Discussed prior outreach on 8/17/20 and inquired if any facilities have been chosen. Iraida requested referrals be submitted to the following facilities:    [address]  [address]  P: [phone]  F: [phone]    [facility]  [address] [place] [address]  P: [phone]  F: [phone]    Estates at [address] [address]  P: [phone]  F: [phone]    [facility]  [address] [place]  [address]  P: [phone]  F: [phone]    Initial LTC referrals submitted to the above facilities.     Assessment:  See bedside nurse and medical team notes.    Plan:    Anticipated Disposition:  LTC with hospice services    Barriers to d/c plan:  Accepting facility    Follow Up:  SWer will continue to be available for any discharge planning and needs.    MART Lester Hematology/Oncology Social Worker  Phone: [phone]  Pager: [phone]  [email or web address]

## 2020-08-18 NOTE — PROGRESS NOTES
Attempted to give patient 2.5 ml of Oxycodone solution. Gave 1.5 ml and patient began to cough; unable to swallow. Wasted remaining 1 ml with BERE Betts.

## 2020-08-19 NOTE — PROGRESS NOTES
[facility], [place]    Acceptance Note - Massiel 2 Service       Date of Admission:  7/28/2020    Assessment & Plan   Caryl Martin is a 70 y.o. F with past medical history of HFpEF, CVA, aortic stenosis and mitral stenosis, HTN, alcoholic cirrhosis (c/b portal hypertensive gastropathy, ascites, encephalopathy, esophageal varices, and bone marrow aplasia), DM type II, CKD stage IV with chronic anemia, morbid obesity, hypothyroidism, wheelchair-bound d/t CIDP, and depression who is transferred from the MICU 8/9 after hemodynamic stabilization for GI bleed and hypotension. She continues to multisystem organ failure with worsening cognitive function and opted for comfort cares 8/16.    For full details of admission, please see interim summary dated 8/10/2020.    Updates today:   - COMFORT CARES   - plan for discharge to facillity + hospice early this week  - will continue PO meds to prevent clinical worsening - rifaxamin for HE and protonix for bleeding  - stopped PO vancomycin after 10 day course - if diarrhea worsens consider restarting for patient comfort    # comfort care  - palliative consulted, appreciate recs  - Pain: oxycodone, lidocain patches, capsaicin, acetaminophen  - diet for comfort  - artificial tears, saliva, atroprine for secretions  - melatonin  - zofran PRN nausea  - lorazepam, haldol PRN delirium/anxiety  - senna + bisacodyl bowel reg PRN  -  services  -  assisting with facility transfer    #Oliguric MARCELO on CKD stage IV  # metabolic acidosis  - matthews for comfort    # Hepatic encephalopathy  # Acute on chronic decompensated EtOH cirrhosis with ascites  # Alcohol use disorder in remission  # Esophageal varices s/p banding x5  # Thrombocytopenia of chronic liver disease   >Ascites:  may need comfort volodymyr   >HE: Continue rifaxamin  >Diet:  CLD    # C.Diff + ileus  # ileus  - simethicone prn  - vancomycin PO QID (8/7- 8/18) - if profuse diarrhea could restart for  comfort    #Intertrigo - Micatin powder PRN     Diet: regular  Fluids: PO  Lines: PIV  DVT Prophylaxis: none  Wellington Catheter: in place, indication: comfort  Code Status: DNR/DNI       Disposition Plan   Expected discharge: 1-2 days to facility + hospice for comfort cares.   Entered: Chava Azevedo MD 08/19/2020, 4:54 PM     The patient's care was discussed with the Attending Physician, Dr. Hodges.    Chava Azevedo MD  [place] [address] Service  Internal Medicine, PGY-1  [facility] [place]  Pager: [phone]    ______________________________________________________________________    Interval History   Patient is lethargic and unable to talk today.  She was able to squeeze my hand when asked. She did not squeezed my hand when asked if she had pain. She appeared comofortable. Music therapy was coming to see her today. She continues to have difficulty swallowing.     Data reviewed today: I reviewed all medications, new labs and imaging results over the last 24 hours. I personally reviewed new labs.    Physical Exam   Vital Signs:                    Weight: 197 lbs 1.46 oz  General: arousable and opens eyes (L>R) but unable to speak with only garbled sounds intermittently, following commands (squeezing hands, opening eyes)

## 2020-08-19 NOTE — PROGRESS NOTES
Music Therapy visit.    Easy listening music was played on piano, concluding with the Isabel.    Pt held eye contact, and seemed to nod in answer to one question, but it was not certain.  No other cognitive or affective response was noted.

## 2020-08-19 NOTE — PROGRESS NOTES
Social Work Services Progress Note    Hospital Day: 23  Date of Initial Social Work Evaluation:  7/31/20  Collaborated with:  Massiel Benito Team; Nursing Home Admission Liaisons; Iraida PHILLIPS ([phone])    Data:  70 year old female who was admitted on 7/28/20 and continues to have multisystem organ failure with worsening cognitive function and opted for comfort cares 8/16/20.     Intervention: Call received from Vladimir admissions liaison for [facility]. Patient has been accepted to the facility for LTC with hospice services. Per Vladimir, patient would initially need to be placed at the TCU and quarantined for the first 14 days. During those initial 14 days, the only visitors the patient is allowed is her hospice agency of choice. If the patient is in the process of actively dying, one member of the patient's family is able to be with her. If the patient is stable through the 14 days of quarantine, the patient is transferred to a semi-private room on the LTC unit. Once transferred to the LTC, the patient is able to have two visitors. Per Vladimir, [facility] has a hospice contract with [facility] and would prefer the patient utilize that agency. According to admissions liaison, [facility] may be able to accept the patient tomorrow, 8/20/20, pending bed availability.      called patient's Iraida PHILLIPS. Provided the above information regarding [facility]. Iraida expressed concerns that the patient may either pass shortly after discharging for the hospital or within the 14 days of quarantine and would not be able to have a family member with her at time of death. Iraida also stated that per the patient's providers, the patient's medical status had changed and she may not live through transport or past 14 days in order to have visits from family members. Iraida asked this writer to follow-up with the patient's providers prior to  moving forward with further discharge planning.     spoke with Massiel Corey 2 Intern. Provided an update regarding the LTC facility and patient's daughter-in-law's concerns regarding patient's prognosis. Dr. Azevedo stated that she would discuss with her attending and follow-up with this writer.     Hospice agency referral not submitted as patient's discharge to [facility] is still pending.     LTC facility denails:       [facility] - denied; facility does not have a contract with [facility] - denied; facility does not have a contract with [facility] - denied; facility does not have a contract with [facility]    Assessment:  See bedside nurse and medical teams notes.    Plan:    Anticipated Disposition:  TBD    Barriers to d/c plan:  Medical stability    Follow Up:  SWer will continue to be available for any discharge planning and needs.    MART Lester  7D Hematology/Oncology Social Worker  Phone: [phone]  Pager: [phone]  [email or web address]

## 2020-08-19 NOTE — PLAN OF CARE
1530- 1900  Caryl continues on comfort cares. 2.5mg oxycodone for discomfort. Refused repositioning, catheter cares done, no bowel movement. Follows commands inconsistently.  Continue focus on comfort. Her two sons are at the bedside.        Problem: Adult Inpatient Plan of Care  Goal: Plan of Care Review  8/19/2020 [id number] by Arleth Leo RN  Outcome: No Change

## 2020-08-19 NOTE — PLAN OF CARE
0819-5729:    Continues on comfort cares. Disoriented x 4. Intermittently responds to voice/touch. Patient appeared restless/uncomfortable this morning while being cleaned. Facial expressions & moaning indicated pain. 2.5mg PO oxycodone solution given x 1. Patient appeared comfortable and slept this afternoon. Saline locked PIV in right foot. Briefs checked q2h d/t intermittent stool incontinence. Sacral mepilex placed on bottom to prevent skin breakdown. Wellington patent, minimal output. Repositioned as needed based on patient's comfort level. Sons planning to visit this evening. No acute events. Continue to monitor & prioritize comfort.

## 2020-08-19 NOTE — PLAN OF CARE
Pt continues on comfort cares.  Disoriented x4.  Pt denies p/n/v.  Denying Medications and refusing most cares.  Was encouraged to let staff reposition and clean as needed.  Had 1 watery BM, was cooperative with cleaning and repositioning.  Will continue to monitor.

## 2020-08-20 NOTE — PLAN OF CARE
[id number]:    Continues on comfort cares. Disoriented x 4. Intermittently responds to voice/touch. Incoherent & lethargic. Patient did not demonstrate nonverbal signs of pain, but appeared restless late this morning and attempted to get out of bed. 0.5mg IV Ativan given x 1. Patient appeared calm & comfortable afterwards. Briefs checked q2h d/t intermittent stool incontinence. Sacral mepilex placed on bottom to prevent skin breakdown. Wellington patent, minimal output. 2-3, small loose stools this shift. Repositioned as needed based on patient's comfort level. Sons to visit this evening. No acute events. Continue to monitor & prioritize comfort.

## 2020-08-20 NOTE — PLAN OF CARE
Pt continues on comfort cares.  Endorses some pain, oxy solution given x1.  Refusing medications and repositioning, although allowed staff to check for BM.  Eliz with minimal OP.  Will continue to monitor.

## 2020-08-20 NOTE — PROGRESS NOTES
[facility], [place]    Acceptance Note - Massiel 2 Service       Date of Admission:  7/28/2020    Assessment & Plan   Caryl Martin is a 70 y.o. F with past medical history of HFpEF, CVA, aortic stenosis and mitral stenosis, HTN, alcoholic cirrhosis (c/b portal hypertensive gastropathy, ascites, encephalopathy, esophageal varices, and bone marrow aplasia), DM type II, CKD stage IV with chronic anemia, morbid obesity, hypothyroidism, wheelchair-bound d/t CIDP, and depression who is transferred from the MICU 8/9 after hemodynamic stabilization for GI bleed and hypotension. She continues to multisystem organ failure with worsening cognitive function and opted for comfort cares 8/16.    For full details of admission, please see interim summary dated 8/10/2020.    Updates today:   - COMFORT CARES   - plan for discharge to facillity + hospice early this week  - will continue PO meds to prevent clinical worsening - rifaxamin for HE and protonix for bleeding  - stopped PO vancomycin after 10 day course - if diarrhea worsens consider restarting for patient comfort    # comfort care  - palliative consulted, appreciate recs  - Pain: oxycodone, lidocain patches, capsaicin, acetaminophen, dilaudid prn  - diet for comfort  - artificial tears, saliva, atroprine for secretions  - melatonin  - zofran PRN nausea  - lorazepam, haldol PRN delirium/anxiety  - senna + bisacodyl bowel reg PRN  -  services  -  assisting with facility transfer    #Oliguric MARCELO on CKD stage IV  # metabolic acidosis  - matthews for comfort    # Hepatic encephalopathy  # Acute on chronic decompensated EtOH cirrhosis with ascites  # Alcohol use disorder in remission  # Esophageal varices s/p banding x5  # Thrombocytopenia of chronic liver disease   >Ascites:  may need comfort volodymyr   >HE: Continue rifaxamin  >Diet:  CLD    # C.Diff + ileus  # ileus  - simethicone prn  - vancomycin PO QID (8/7- 8/18) - if profuse diarrhea could  restart for comfort    #Intertrigo - Micatin powder PRN     Diet: regular  Fluids: PO  Lines: PIV  DVT Prophylaxis: none  Wellington Catheter: in place, indication: comfort  Code Status: DNR/DNI       Disposition Plan   Expected discharge: pending facility acceptance + hospice.     Entered: Chava Azevedo MD 08/20/2020, 6:32 PM     The patient's care was discussed with the Attending Physician, Dr. Hodges.    Chava Azevedo MD  [address]  Internal Medicine, PGY-1  [facility], [place]  Pager: [phone]    ______________________________________________________________________    Interval History   Patient is drowsy and unable to talk coherently today but more alert today, able to nod when asked questions. She really wanted her family to visit. She was able to follow commands, did not endorse pain. She appeared comofortable. She continues to have difficulty swallowing.     Data reviewed today: I reviewed all medications, new labs and imaging results over the last 24 hours. I personally reviewed new labs.    Physical Exam   Vital Signs:                    Weight: 197 lbs 1.46 oz  General: arousable and opens eyes (L>R) but does not have audible and clear speech, whispers garbled words, following commands (squeezing hands, opening eyes)

## 2020-08-21 NOTE — PLAN OF CARE
0732-3197:    Continues on comfort cares. Disoriented x 4. Intermittently responds to voice/touch. Lethargic. Patient demonstrated nonverbal signs of pain this morning while being turned. 0.5mg IV Dilaudid given x 1. Bed bath given today. Patient appeared calm & comfortable afterwards. 0.5mg IV Ativan given x 1 this afternoon. Briefs checked q2h- no stool this shift. Sacral mepilex placed on bottom to prevent skin breakdown. Wellington patent, minimal output. Repositioned as needed based on patient's comfort level. No acute events. Continue to monitor & prioritize comfort.

## 2020-08-21 NOTE — PLAN OF CARE
Pt continues on comfort cares.   Appears comfortable throughout the night.  No BMs overnight.  Wellington with minimal OP.  Will continue to monitor.

## 2020-08-21 NOTE — PROGRESS NOTES
[facility], [place]    Acceptance Note - Massiel 2 Service       Date of Admission:  7/28/2020    Assessment & Plan   Caryl Martin is a 70 y.o. F with past medical history of HFpEF, CVA, aortic stenosis and mitral stenosis, HTN, alcoholic cirrhosis (c/b portal hypertensive gastropathy, ascites, encephalopathy, esophageal varices, and bone marrow aplasia), DM type II, CKD stage IV with chronic anemia, morbid obesity, hypothyroidism, wheelchair-bound d/t CIDP, and depression who is transferred from the MICU 8/9 after hemodynamic stabilization for GI bleed and hypotension. She continues to multisystem organ failure with worsening cognitive function and opted for comfort cares 8/16.    For full details of admission, please see interim summary dated 8/10/2020.    Updates today:   - COMFORT CARES   - plan for discharge to facillity + hospice as able  - will continue PO meds to prevent clinical worsening - rifaxamin for HE and protonix for bleeding    # comfort care  - palliative consulted, appreciate recs  - Pain: oxycodone, lidocain patches, capsaicin, acetaminophen, dilaudid prn  - diet for comfort  - artificial tears, saliva, atroprine for secretions  - melatonin  - zofran PRN nausea  - lorazepam, haldol PRN delirium/anxiety  - senna + bisacodyl bowel reg PRN  -  services  - SW assisting with facility transfer    #Oliguric MARCELO on CKD stage IV  # metabolic acidosis  - matthews for comfort    # Hepatic encephalopathy  # Acute on chronic decompensated EtOH cirrhosis with ascites  # Alcohol use disorder in remission  # Esophageal varices s/p banding x5  # Thrombocytopenia of chronic liver disease   >Ascites:  may need comfort volodymyr   >HE: Continue rifaxamin  >Diet:  CLD    # C.Diff + ileus  # ileus  - simethicone prn  - vancomycin PO QID (8/7- 8/18) - if profuse diarrhea could restart for comfort    #Intertrigo - Micatin powder PRN     Diet: regular  Fluids: PO  Lines: PIV  DVT Prophylaxis:  none  Wellington Catheter: in place, indication: comfort  Code Status: DNR/DNI       Disposition Plan   Expected discharge: pending facility acceptance + hospice.     Entered: Chava Azevedo MD 08/21/2020, 7:41 AM     The patient's care was discussed with the Attending Physician, Dr. Hodges.    Chava Azevedo MD  [place] [address] Service  Internal Medicine, PGY-1  [facility], [place]  Pager: [phone]    ______________________________________________________________________    Interval History   Patient is drowsy but able to talk today - she continually asks her her sons, why they are not there and when they are coming. She denies pain, nausea, abdominal pain. She appeared comfortable, music was playing and she was looking out the window.       Data reviewed today: I reviewed all medications, new labs and imaging results over the last 24 hours. I personally reviewed new labs.    Physical Exam   Vital Signs:                    Weight: 197 lbs 1.46 oz  General: arousable and opens eyes (L>R), speech clear and audible today, confused, following commands (squeezing hands, opening eyes)

## 2020-08-22 NOTE — PLAN OF CARE
[id number]    Continues on comfort cares. Intermittently arouses to touch/voice. Pt showed signs of restlessness/pain, 0.5 IV Dilaudid given x1. Pt appeared more comfortable afterwards. Briefs checked q3h, no BM this shift. Wellington patent, 75mL output. Pt turned as needed for comfort. Continue to monitor, unable to make needs known.

## 2020-08-22 NOTE — PROGRESS NOTES
[facility], [place]    Acceptance Note - Massiel 2 Service       Date of Admission:  7/28/2020    Assessment & Plan   Caryl Martin is a 70 y.o. F with past medical history of HFpEF, CVA, aortic stenosis and mitral stenosis, HTN, alcoholic cirrhosis (c/b portal hypertensive gastropathy, ascites, encephalopathy, esophageal varices, and bone marrow aplasia), DM type II, CKD stage IV with chronic anemia, morbid obesity, hypothyroidism, wheelchair-bound d/t CIDP, and depression who is transferred from the MICU 8/9 after hemodynamic stabilization for GI bleed and hypotension. She continues to multisystem organ failure with worsening cognitive function and opted for comfort cares 8/16.    For full details of admission, please see interim summary dated 8/10/2020.    Updates today:   - COMFORT CARES   - called and updated family today with change in patient's mental status, not following commands today and breathing has slowed  - plan for discharge to facillity + hospice as able    # comfort care  - palliative consulted, appreciate recs, signed off  - Pain: oxycodone, lidocain patches, capsaicin, acetaminophen, dilaudid prn  - diet for comfort  - artificial tears, saliva, atroprine for secretions  - melatonin  - zofran PRN nausea  - lorazepam, haldol PRN delirium/anxiety  - senna + bisacodyl bowel reg PRN  -  services  - SW assisting with facility transfer    #Oliguric MARCELO on CKD stage IV  # metabolic acidosis  - matthews for comfort    # Hepatic encephalopathy  # Acute on chronic decompensated EtOH cirrhosis with ascites  # Alcohol use disorder in remission  # Esophageal varices s/p banding x5  # Thrombocytopenia of chronic liver disease   >Ascites:  may need comfort volodymyr   >Diet:  As tolerated    # C.Diff + ileus  # ileus  - simethicone prn  - vancomycin PO QID (8/7- 8/18) - if profuse diarrhea could restart for comfort    #Intertrigo - Micatin powder PRN     Diet: regular  Fluids:  PO  Lines: PIV  DVT Prophylaxis: none  Wellington Catheter: in place, indication: comfort  Code Status: DNR/DNI       Disposition Plan   Expected discharge: pending facility acceptance + hospice.     Entered: Chava Azevedo MD 08/22/2020, 7:37 AM     The patient's care was discussed with the Attending Physician, Dr. Hodges.    Chava Azevedo MD  [place] [address] Service  Internal Medicine, PGY-1  [facility], [place]  Pager: [phone]    ______________________________________________________________________    Interval History   Not tolerating PO. received IV dilaudid overnight with concern for respiratory depression. Pt does have slowed respirations but is not In distress this am. When I visit she appears comfortable and is loud breathing (snoring), she opens eyes partially but does not localize me or my voice, does not follow commands and does not try to speak which is changed from yesterday when she was confused but had clear speech and was asking for her sons.  She appeared comfortable, music was playing and she was resting.    Data reviewed today: I reviewed all medications, new labs and imaging results over the last 24 hours. I personally reviewed new labs.    Physical Exam   Vital Signs:                    Weight: 197 lbs 1.46 oz  General: Patient is lethargic, opens eyes partially to voice and touch but does not localize. She does not have coherent or audible/intelligible speech today. She does not follow commands.

## 2020-08-22 NOTE — PLAN OF CARE
Time: 1988-6451    Reason for Admission: Multisystem organ failure with worsening cognitive function.     Activity: Assist x2 with turns in bed.     Neuro: Disoriented x4. Pt intermittently responds to voice/touch.     GI/: Wellington in place with minimal output. No BM this shift.    Diet: Pt not taking anything by mouth.     Incisions/Drains: Wellington in place.    IV Access: PIV in R foot, saline locked.    Vitals: Vitals not done as pt on comfort cares.     Pain: Pt expressed pain x1. PRN dilaudid given.     New changes this shift: None    Plan: Continue with plan of care.

## 2020-08-22 NOTE — PLAN OF CARE
Patient moaning with turning and iv dilaudid given of 0.5.Patient breathing  subtly changed to irregular and  became more sleepy.Turned for comfort x3. Mouthcare x3.Continue to monitor

## 2020-08-22 NOTE — PLAN OF CARE
9852-1180: Pt remain on comfort cares. Breathing regular, occasionally moans with exhale. Largely unresponsive to stimuli, will respond intermittently to gentle touch and repositioning. Increased moaning and restlessness, possibly related to pain, but pt unable to express this. 0.5 mg IV dilaudid given x1. Wellington patent, minimal output. Repositioning approx q2h, as pt will tolerate. Discussed POC with pt's son, Yogi. Continue with POC.

## 2020-08-23 NOTE — PLAN OF CARE
Patient continues to be on comfort care.Breathing is more regular than 24 hours ago.Giving smaller amounts dilaudid more frequently and pt appears more comfortable.Turning  At approximately 0700.Mouth care and turns x 3 .Continue to monitor.

## 2020-08-23 NOTE — PLAN OF CARE
8114-7340: Pt continues on comfort cares. Continues to be unresponsive unless repositioning. Grimacing and moaning noted during repositioning, 0.5 mg IV dilaudid given x1, otherwise pt appeared comfortable. Breathing pattern regular and normal depth; accessory and abdominal muscles in use. Wellington patent, low output. No BM this shift. Repositioning q2h, or as pt tolerates. Oral cares completed, face washed. Pt's sons visited this afternoon. Continue with POC.

## 2020-08-23 NOTE — PROGRESS NOTES
[facility], [place]    Acceptance Note - Markrista 2 Service       Date of Admission:  7/28/2020    Assessment & Plan   Caryl Martin is a 70 y.o. F with past medical history of HFpEF, CVA, aortic stenosis and mitral stenosis, HTN, alcoholic cirrhosis (c/b portal hypertensive gastropathy, ascites, encephalopathy, esophageal varices, and bone marrow aplasia), DM type II, CKD stage IV with chronic anemia, morbid obesity, hypothyroidism, wheelchair-bound d/t CIDP, and depression who is transferred from the MICU 8/9 after hemodynamic stabilization for GI bleed and hypotension. She continues to multisystem organ failure with worsening cognitive function and opted for comfort cares 8/16.    For full details of admission, please see interim summary dated 8/10/2020.    # comfort care  - palliative consulted, appreciate recs, signed off  - Pain: oxycodone, lidocain patches, capsaicin, acetaminophen, dilaudid prn  - diet for comfort  - artificial tears, saliva, atroprine for secretions  - melatonin  - zofran PRN nausea  - lorazepam, haldol PRN delirium/anxiety  - senna + bisacodyl bowel reg PRN  -  services  -  assisting with facility transfer    #Oliguric MARCEOL on CKD stage IV  # metabolic acidosis  - matthews for comfort    # Hepatic encephalopathy  # Acute on chronic decompensated EtOH cirrhosis with ascites  # Alcohol use disorder in remission  # Esophageal varices s/p banding x5  # Thrombocytopenia of chronic liver disease   >Ascites:  may need comfort volodymyr   >Diet:  As tolerated    # C.Diff + ileus  # ileus  - vancomycin PO QID (8/7- 8/18) - if profuse diarrhea could restart for comfort    #Intertrigo - Micatin powder PRN     Diet: regular  Fluids: PO  Lines: PIV  DVT Prophylaxis: none  Matthews Catheter: in place, indication: comfort  Code Status: DNR/DNI       Disposition Plan   Expected discharge: pending facility acceptance + hospice.     Entered: Chava Azevedo MD 08/23/2020, 3:17 PM      The patient's care was discussed with the Attending Physician, Dr. Hodges.    MD Debbie [facility] 2 [facility]  Internal Medicine, PGY-1  [facility], [place]  Pager: [phone]    ______________________________________________________________________    Interval History      Not tolerating PO.  Unresponsive.  Per nursing reports, IV Dilaudid given when grimacing and moaning with good effect.  Respirations adequate.. She appeared comfortable, music was playing and she was resting.    Data reviewed today: I reviewed all medications, new labs and imaging results over the last 24 hours. I personally reviewed new labs.    Physical Exam   Vital Signs:           Resp: 16        Weight: 197 lbs 1.46 oz  General: Patient is somnolent, eyes are partially open but she does not tract. Does not try to speak, does not follow commands.

## 2020-08-24 NOTE — PLAN OF CARE
Patient status changed over night/evening as is having more secretions so atropine given x6  overnight.Patient has much phlegm but able to clear airway however using suction at bedside .Patient given 2 doses dilaudid iv for general moaning as patient is unresponsive so must infer.Uop decreaing.Turned x 3.ontinue to monitor

## 2020-08-24 NOTE — PLAN OF CARE
Pt. passed away @ approximately 10:50 this morning.  Family was notified and will be coming to see  Pt. Between 17:30 and 18:00.  Organ and tissue coordinator notified.  Pt. will go down to [facility] after family leaves.  Paper work is in Pt.'s folder and still needs to be signed by provider.  Continue plan of care.

## 2020-08-24 NOTE — PLAN OF CARE
2522-9263    Comfort Cares. Pt on RA with respirations 18 at start of shift, now 12. Respirations labored with some accessory muscles. Dilaudid 0.5mg given x1 for moaning and grimacing during repositioning. No BM. 75ccs urine output in matthews. Pt unable to verbalize needs. Currently comfortable.

## 2020-08-24 NOTE — DEATH PRONOUNCEMENT
MD DEATH PRONOUNCEMENT    Called to pronounce Analy Martin dead.    Physical Exam: Unresponsive to noxious stimuli, Spontaneous respirations absent, Breath sounds absent, Carotid pulse absent, Heart sounds absent, Pupillary light reflex absent and Corneal blink reflex absent    Patient was pronounced dead at 10:50 AM, 2020.    Preliminary Cause of Death: Multisystem organ failure    Active Problems:    Decompensated hepatic cirrhosis (H)     Infectious disease present?: YES - C.difficile infection    Communicable disease present? (examples: HIV, chicken pox, TB, Ebola, CJD) :  NO    Multi-drug resistant organism present? (example: MRSA): YES - VRE    Please consider an autopsy if any of the following exist:  NO Unexpected or unexplained death during or following any dental, medical, or surgical diagnostic treatment procedures.   NO Death of mother at or up to seven days after delivery.     NO All  and pediatric deaths.     NO Death where the cause is sufficiently obscure to delay completion of the death certificate.   NO Deaths in which autopsy would confirm a suspected illness/condition that would affect surviving family members or recipients of transplanted organs.     The following deaths must be reported to the 's Office:  NO A death that may be due entirely or in part to any factors other than natural disease (recent surgery, recent trauma, suspected abuse/neglect).   NO A death that may be an accident, suicide, or homicide.     NO Any sudden, unexpected death in which there is no prior history of significant heart disease or any other condition associated with sudden death.   NO A death under suspicious, unusual, or unexpected circumstances.    NO Any death which is apparently due to natural causes but in which the  does not have a personal physician familiar with the patient s medical history, social, or environmental situation or the circumstances of the terminal event.    NO Any death apparently due to Sudden Infant Death Syndrome.     NO Deaths that occur during, in association with, or as consequences of a diagnostic, therapeutic, or anesthetic procedure.   NO Any death in which a fracture of a major bone has occurred within the past (6) six months.   NO A death of persons note seen by their physician within 120 days of demise.     NO Any death in which the  was an inmate of a public institution or was in the custody of Law Enforcement personnel.   NO  All unexpected deaths of children   NO Solid organ donors   NO Unidentified bodies   YES Deaths of persons whose bodies are to be cremated or otherwise disposed of so that the bodies will later be unavailable for examination;   NO Deaths unattended by a physician outside of a licensed healthcare facility or licensed residential hospice program   NO Deaths occurring within 24 hours of arrival to a health care facility if death is unexpected.    NO Deaths associated with the decedent s employment.   NO Deaths attributed to acts of terrorism.   NO Any death in which there is uncertainty as to whether it is a medical examiner s care should be discussed with the medical investigator.      Body disposition: Autopsy was discussed with family member:  Son and Health care power of jillian- Daughter-in-Law, Sofía Martin by phone.  Permission for autopsy was declined.  Body released to [facility] [employer]

## 2020-08-25 NOTE — PLAN OF CARE
[id number]: Provider at bedside, signed death certificate. [place] catheter removed and bed bath given. Sons at bedside along with daughter in law. Support provided and questions answered. Pt picked up by security and brought down to the morgue at 2130.

## 2020-11-06 NOTE — PROGRESS NOTES
Antimicrobial Stewardship Team Note    Antimicrobial Stewardship Program - A joint venture between [facility] and  Physicians to optimize antibiotic management.  NOT a formal consult - Restricted Antimicrobial Review     Patient: Analy Martin  MRN: [id number]  Allergies: Patient has no known allergies.    Brief Summary: Analy Martin is a 70-year old with PMH significant for HFpEF, CVA, aortic stenosis and mitral stenosis, HTN, alcoholic cirrhosis (c/b portal hypertensive gastropathy, ascites, encephalopathy, esophageal varices, and bone marrow aplasia), DM type II, CKD stage IV with chronic anemia, morbid obesity, hypothyroidism, wheelchair-bound d/t CIDP, and depression admitted to [facility] MICU on 07/28 transfer from [place] for possible CRRT in setting of acute renal failure.    HPI: Patient has complicated past medical history, as well as a complex work-up since admission. At OSH, hospitalization was complicated by acute on chronic decompensated cirrhosis with bleeding esophageal varices (now s/p banding x5 on 7/27), septic shock, and progressive renal failure, likely provoked by ESBL UTI. Patient completed 72 hours of octreotide gtt post-op via RIJ (removed 07/30), prophylactic ceftriaxone (7-day course finished 08/03), and was continued on lactulose and rifaximin for hepatic encephalopathy. She had slowly improving renal function with downtrending creatinine, and did not require CRRT/HD. She underwent paracentesis with 3L removed by IR and albumin replacement on 07/31, negative for SBP. Patient became increasingly encephalopathic as of 08/02 evening and stroke code was called that evening for increased lethargy and unequal pupils per nursing; Neuro felt that suspicion for stroke low, no further workup pursued. Repeat 2L paracentesis on 08/03 negative for SBP and rectal lactulose initiated.  She then developed Symptomatic UTI, confirmed VRE+, and linezolid was started. Repeat paracentesis  Bedside and Verbal shift change report given to [address] [place] [address] (oncoming nurse) by Oh Carlton RN (offgoing nurse). Report included the following information SBAR, OR Summary, Intake/Output and MAR. attempted 08/07 but no ascites noted, dilated loops of bowel seen. She became hypotensive to 80/50s, pRBCs were given and she was transferred to the MICU for further management. She underwent emergent EGD with blood/clots in stomach but no evidence of variceal, gastric or duodenal source of bleeding.  A RIJ CVC was placed for decreasing MAPs however her BP improved with 1L LR bolus and 2u pRBCs. She was hemodynamically stable and her condition improved without evidence of recurrent GI bleeding. Her C.diff PCR returned positive and she was started on oral vancomycin (08/07). Her KUB suggestive of ileus, and she was made NPO (except medications) for bowel rest. UA with pyuria +yeast was noted on 08/12 and fluconazole was administered. Concerns for SBP prompted initiation of ceftriaxone (08/12). Ascites fluid negative for SBP on 08/13. Patient has had increasing difficulty tolerating oral medications due to discomfort. Goals of care conversation occurred on 08/13, given poor prognosis, and no final decision was made at that time regarding restorative vs comfort care.           Active Anti-infective Medications   (From admission, onward)                Start     Stop    08/07/20 1515  vancomycin  125 mg,   Oral,   4 TIMES DAILY     Clostridioides difficile        --    07/30/20 0800  rifaximin  550 mg,   Oral,   2 TIMES DAILY     HE        --          Assessment:   Patient has complex hospitalization 2/2 significant past medical history. Currently, patient is afebrile, oxygenating well on room air, WBC wnl. Intermittent hypotensive episodes likely 2/2 UGIB and uptrending creatinine likely 2/2 hypotension, currently not on CRRT/HD. Hepatic encephalopathy has been improving on rifaximin and lactulose. C. difficile infection is currently being treated with oral vancomycin, started 08/07 to complete 10-day course on 8/17. Patient has been noted to have difficulty with oral medications, and pending goals of care decision,  may need to switch to rectally administered vancomycin if difficulty with oral medications persists. Only one dose of vancomycin PO was noted to be missed on 8/13. Patient is currently without urinary symptoms, having completed a 10-day course of linezolid on 08/13 for VRE UTI. UA on 08/12 showed pyuria and yeast, and fluconazole was given. Anticipate patient's urine will be colonized with yeast given recent antibiotic exposure with ceftriaxone and linezolid. Recommend not treating yeast or bacteria in the urine unless patient endorses urinary symptoms given risks of antibiotic therapy (I.e. antibiotic resistance, adverse drug effects, and CDI) is expected to outweigh benefits. Current concerns for SBP, prompting initiation of ceftriaxone (08/12), are unlikely given negative ascites fluid (), and absence of fever or leukocytosis. Agree with discontinuing ceftriaxone. Recommend continuing rifaximin, and continuing vancomycin for 10-day course of therapy through 08/17.    Recommendations:   1. Agree with stopping ceftriaxone  2. Continue Vancomycin PO for 10  day course (to be completed 8/17)    Pharmacy took the following actions: Electronic note created, Sticky note reminder created, Called/paged provider.    Discussed with ID Staff: Cristofer Shah MD and Christine Barnett, PharmD, BCIDP  Naveen Potter, PharmD Candidate 2021  Phone: [phone]  Pager: [phone]      Vital Signs/Clinical Features:  Vitals         08/12 0700  -  08/13 0659 08/13 0700  -  08/14 0659 08/14 0700  -  08/14 1348   Most Recent    Temp ( F) 97.5 -  98.6    97.7 -  98    97.6 -  97.8     97.6 (36.4)    Pulse 91 -  108    86 -  100       92    Heart Rate 89 -  107    86 -  99    83 -  86     83    Resp 11 -  25    16 -  18    16 -  18     16    BP 65/33 -  135/86    84/61 -  110/53    92/44 -  95/53     92/44    SpO2 (%) 92 -  100    96 -  100    96 -  100     96            Labs  Estimated Creatinine Clearance: 14.2 mL/min (A) (based  on SCr of 3.62 mg/dL (H)).  Recent Labs   Lab Test 08/09/20  0316 08/10/20  0303 08/11/20  0350 08/12/20  0346 08/13/20  0528 08/14/20  0439   CR 2.53* 2.58* 2.73* 3.15* 3.44* 3.62*       Recent Labs   Lab Test 08/08/20  1130 08/08/20  1937 08/09/20  0316  08/10/20  0303 08/10/20  2212 08/10/20  2306 08/11/20  0350 08/12/20  0346 08/12/20  0947 08/13/20  0528   WBC 5.6 5.7 5.3  --   --   --   --  6.4 7.5  --  6.5   HGB 7.7* 7.5* 7.5*   < > 8.6* Canceled, Test credited 7.5* 7.3* 7.7* 7.5* 7.6*   HCT 25.5* 25.0* 24.5*  --   --   --   --  23.4* 25.2*  --  24.7*   MCV 99 99 99  --   --   --   --  98 98  --  98   PLT 62* 66* 54*  --  57*  --   --  41* 53*  --  38*    < > = values in this interval not displayed.       Recent Labs   Lab Test 08/02/20  0804 08/03/20  0716 08/04/20  0721 08/07/20  0622 08/08/20  0356 08/13/20  0528   BILITOTAL 1.4* 1.3 1.4* 1.1 1.0 1.5*   ALKPHOS 88 102 111 120 100 100   ALBUMIN 2.7* 2.7* 3.1* 2.6* 2.2* 2.8*   AST 10 11 10 8 7 8   ALT 10 10 10 11 10 10       Recent Labs   Lab Test 08/07/20  1400 08/12/20  0947   LACT Duplicate request 2.6*             Culture Results:  7-Day Micro Results       Procedure Component Value Units Date/Time    Cell count with differential fluid [] Collected:  08/13/20 1000    Order Status:  Completed Lab Status:  Edited Result - FINAL Updated:  08/13/20 1300    Specimen:  Ascites      Body Fluid Analysis Source Ascites     Color Fluid Yellow     Appearance Fluid Slightly Cloudy     WBC Fluid 104 /uL      Comment: No reference ranges have been established.  This result should be interpreted   in the context of the patient's clinical condition and compared to   simultaneous measurement in the patient's blood.  Refer to Lab Guide for   specific interpretive guidelines.          % Neutrophils Fluid 61 %      % Lymphocytes Fluid 3 %      % Other Cells Fluid 36 %      Comment: Other cells are Monocytes, Macrophages, and Mesothelial cells.       Gram stain []  Collected:  08/13/20 1000    Order Status:  Completed Lab Status:  Final result Updated:  08/13/20 1352    Specimen:  Ascites      Specimen Description Ascites     Gram Stain No organisms seen      Moderate  WBC'S seen  predominantly PMN's        Quantification of host cells and microbiological organisms was done on a cytocentrifuged   preparation.      fluid culture - Aerobic Bacterial [] Collected:  08/13/20 1000    Order Status:  Completed Lab Status:  Preliminary result Updated:  08/14/20 0813    Specimen:  Ascites      Specimen Description Ascites     Culture Micro Culture negative monitoring continues            Recent Labs   Lab Test 08/04/20  1000 08/12/20  1034   URINEPH 5.5 5.0   NITRITE Negative Negative   LEUKEST Moderate* Large*   WBCU 17* 74*                   Recent Labs   Lab Test 08/07/20  1224   CDBPCT Positive*       Imaging: Us Abdomen Limited    Result Date: 8/12/2020  EXAMINATION: US ABDOMEN LIMITED, 8/12/2020 10:06 AM COMPARISON: Ascites evaluation for paracentesis 8/7/2020. HISTORY: Evaluate for ascites pockets amenable to paracentesis. TECHNIQUE: Gray scale ultrasound of the abdomen. FINDINGS: A targeted ultrasound of all 4 abdominal quadrants was performed and demonstrated moderate ascites throughout the abdomen and pelvis. Largest pocket demonstrated in the right lower quadrant. Ascites has increased from prior ultrasound. Mild gallbladder wall thickening likely due to underlying ascites. Spleen appears enlarged although not entirely included, at least 17 cm craniocaudad. Liver appears heterogeneous, again not entirely included.     IMPRESSION: 1.  Ascites throughout the abdomen and pelvis, increased from prior paracentesis assessment exam. 2.  Heterogeneous liver presumably reflects the cirrhosis history as per the electronic medical record. 3.  Splenomegaly suspected although not entirely included, likely portal hypertension related. LISA FREDERICK MD    Xr Chest Port 1  View    Result Date: 8/7/2020  EXAMINATION: XR CHEST PORT 1 VW, 8/7/2020 6:38 PM INDICATION: confirm CVC placement COMPARISON: 8/6/2020 FINDINGS: Single portable AP radiograph of the chest. Right IJ central venous catheter with tip terminating over the low SVC. Trachea is midline. The cardiomediastinal silhouette is stable. Small, right greater than left pleural effusions. No pneumothorax. Bilateral interstitial and airspace opacities. Partially visualized upper abdomen is unremarkable. Benign-appearing sclerotic change of the fifth posterior right rib apparent sclerotic change of posterior right rib 2. destructive changes of the shoulders bilaterally.     IMPRESSION: 1. Right IJ central venous catheter with tip terminating over the low SVC. 2. Mild pulmonary edema. Bibasilar atelectasis. 3. Small right pleural effusion. I have personally reviewed the examination and initial interpretation and I agree with the findings. JOHN HERNANDEZ MD    Xr Abdomen Port 1 View    Result Date: 8/12/2020  Exam: XR ABDOMEN PORT 1 VW, 8/12/2020 11:35 AM Indication: ileus/SBO Comparison: 8/9/2020 Findings: Supine frontal views of the abdomen. Diffusely air distended small and large bowel, similar to prior. No appreciable pneumatosis or portal venous gas. Vascular calcifications. The lung bases are clear. No acute osseous normality.     Impression: Stable diffusely air distended small and large bowel, compatible with ongoing adynamic ileus HAIM COSEM, DO    Xr Abdomen Port 1 View    Result Date: 8/9/2020  XR ABDOMEN PORT 1 VW on 8/9/2020 8:00 PM. INDICATION: increasing pain. COMPARISON: Radiograph dated 8/7/2020 FINDINGS: Portable AP supine radiographs of the abdomen. Gas distended large and small bowel loops are minimally decreased. No pneumatosis or portal venous gas. Small right pleural effusion.     IMPRESSION: 1. Slightly decreased gaseous distention of large and small bowel but bowel loops which likely represent ileus. 2.  Small right pleural effusion. I have personally reviewed the examination and initial interpretation and I agree with the findings. JOHN HERNANDEZ MD    Xr Abdomen Port 1 View    Result Date: 8/7/2020  Exam: XR ABDOMEN PORT 1 VW, 8/7/2020 2:21 PM Indication: gi bleed, ab pain Comparison: Chest radiograph 6/20/2020. Ultrasound 7/29/2020. Findings: Two supine views of the lower pelvis and one supine view of the abdomen. Distended and gas-filled stomach, small bowel, and large bowel. Multiple calcified and tortuous arteries throughout the abdomen and upper thighs. The lung bases are visualized.     Impression: Gas-filled distention of stomach, small bowel, and large bowel. Favoring ileus. If there is clinical concern for obstruction follow-up radiographs can be obtained. I have personally reviewed the examination and initial interpretation and I agree with the findings. LISA FREDERICK MD    Ir Paracentesis    Result Date: 8/13/2020  PROCEDURES 8/13/2020: 1. Ultrasound guided paracentesis. CLINICAL HISTORY: 70-year-old female with a composite alcoholic cirrhosis with ascites. Request received for paracentesis. Comparisons: Ultrasound abdomen 8/12/2020. Staff Radiologist: Dinh Mcintosh MD Medications: The patient was placed on continuous monitoring. No intravenous sedation was administered. The patient remained stable throughout the procedure. PROCEDURE: The patient understood the limitations, alternatives, and risks of the procedure and requested the procedure be performed. Both written and oral consent were obtained. Right lower quadrant was prepped and draped in the usual sterile fashion. 1% lidocaine without epinephrine was used for local anesthesia. Under ultrasound guidance, a 5 [employer] centesis needle catheter was advanced into the peritoneal space. Image documenting catheter position and ascites was entered into the patient's permanent record. Catheter was connected to vacuum drainage.  2000 mL ascites aspirated. Catheter removed. Sterile dressing applied. No immediate complication.     IMPRESSION: Ultrasound guided paracentesis. 2000 mL ascites aspirated.

## 2021-03-27 NOTE — PLAN OF CARE
Discharge Planner OT   Patient plan for discharge: Not discussed this session  Current status: Pt Lane x2 for log roll L<>R to place sling. Dependently lifted to recliner to perform ADL task. Pt more alert and appropriate once seated upright, oriented throughout.  Barriers to return to prior living situation: impaired ADL, deconditioning, level of assist  Recommendations for discharge: TCU  Rationale for recommendations: Pt below baseline for ADL/IADL and mobility. Would benefit from additional skilled therapy to address above deficits.        Entered by: Justina Coombs 08/06/2020 2:51 PM        Report received from Elijah RN, assumed care of pt. Plan of care discussed with pt, labs and chart reviewed. All needs met at this time. Tele box on. Call light within reach, bed locked and in lowest position. All fall precautions and hourly rounding in place. Will continue to monitor.

## 2021-05-28 ENCOUNTER — RECORDS - HEALTHEAST (OUTPATIENT)
Dept: ADMINISTRATIVE | Facility: CLINIC | Age: 71
End: 2021-05-28

## 2021-05-31 ENCOUNTER — RECORDS - HEALTHEAST (OUTPATIENT)
Dept: ADMINISTRATIVE | Facility: CLINIC | Age: 71
End: 2021-05-31

## 2021-06-02 ENCOUNTER — RECORDS - HEALTHEAST (OUTPATIENT)
Dept: ADMINISTRATIVE | Facility: CLINIC | Age: 71
End: 2021-06-02

## (undated) RX ORDER — LIDOCAINE HYDROCHLORIDE 10 MG/ML
INJECTION, SOLUTION EPIDURAL; INFILTRATION; INTRACAUDAL; PERINEURAL
Status: DISPENSED
Start: 2020-01-01

## (undated) RX ORDER — ALBUMIN (HUMAN) 12.5 G/50ML
SOLUTION INTRAVENOUS
Status: DISPENSED
Start: 2020-01-01